# Patient Record
Sex: FEMALE | Race: BLACK OR AFRICAN AMERICAN | NOT HISPANIC OR LATINO | Employment: OTHER | URBAN - METROPOLITAN AREA
[De-identification: names, ages, dates, MRNs, and addresses within clinical notes are randomized per-mention and may not be internally consistent; named-entity substitution may affect disease eponyms.]

---

## 2023-03-08 ENCOUNTER — TELEPHONE (OUTPATIENT)
Dept: PRIMARY CARE | Facility: CLINIC | Age: 28
End: 2023-03-08
Payer: MEDICARE

## 2023-03-08 NOTE — TELEPHONE ENCOUNTER
Pt. Was seen in the ER at [facility] on Zak 3/5 and was diagnosed with Type B Influensa.    Pt. Is a [facility] resident.     Please let [facility] know as how to proceed.    Jeny/[facility]  [phone]  can lvm

## 2023-03-20 LAB
HYALINE CASTS, URINE: ABNORMAL /LPF
RBC, URINE: ABNORMAL /HPF (ref 0–5)
SQUAMOUS EPITHELIAL CELLS, URINE: 2 /HPF
WBC, URINE: ABNORMAL /HPF (ref 0–5)

## 2023-03-21 LAB — URINE CULTURE: NORMAL

## 2023-05-05 ENCOUNTER — HOSPITAL ENCOUNTER (OUTPATIENT)
Dept: DATA CONVERSION | Facility: HOSPITAL | Age: 28
End: 2023-05-05
Attending: ORTHOPAEDIC SURGERY | Admitting: ORTHOPAEDIC SURGERY
Payer: MEDICARE

## 2023-05-05 DIAGNOSIS — E55.9 VITAMIN D DEFICIENCY, UNSPECIFIED: ICD-10-CM

## 2023-05-05 DIAGNOSIS — G80.0 SPASTIC QUADRIPLEGIC CEREBRAL PALSY (MULTI): ICD-10-CM

## 2023-05-05 DIAGNOSIS — G47.00 INSOMNIA, UNSPECIFIED: ICD-10-CM

## 2023-05-05 DIAGNOSIS — G35 MULTIPLE SCLEROSIS (MULTI): ICD-10-CM

## 2023-05-05 DIAGNOSIS — K21.9 GASTRO-ESOPHAGEAL REFLUX DISEASE WITHOUT ESOPHAGITIS: ICD-10-CM

## 2023-05-05 DIAGNOSIS — J45.909 UNSPECIFIED ASTHMA, UNCOMPLICATED (HHS-HCC): ICD-10-CM

## 2023-05-05 DIAGNOSIS — D64.9 ANEMIA, UNSPECIFIED: ICD-10-CM

## 2023-05-05 DIAGNOSIS — F41.9 ANXIETY DISORDER, UNSPECIFIED: ICD-10-CM

## 2023-05-05 DIAGNOSIS — F20.9 SCHIZOPHRENIA, UNSPECIFIED (MULTI): ICD-10-CM

## 2023-05-08 PROBLEM — Q65.89 DEVELOPMENTAL DYSPLASIA OF HIP (HHS-HCC): Status: ACTIVE | Noted: 2023-05-08

## 2023-05-08 PROBLEM — R07.89 ATYPICAL CHEST PAIN: Status: ACTIVE | Noted: 2023-05-08

## 2023-05-08 PROBLEM — R63.0 POOR APPETITE: Status: ACTIVE | Noted: 2023-05-08

## 2023-05-08 PROBLEM — G44.229 CHRONIC TENSION-TYPE HEADACHE, NOT INTRACTABLE: Status: ACTIVE | Noted: 2023-05-08

## 2023-05-08 PROBLEM — K59.00 CONSTIPATION: Status: ACTIVE | Noted: 2023-05-08

## 2023-05-08 PROBLEM — M24.539 CONTRACTURE OF WRIST: Status: ACTIVE | Noted: 2023-05-08

## 2023-05-08 PROBLEM — R07.89 CHEST PAIN, MIDSTERNAL: Status: ACTIVE | Noted: 2023-05-08

## 2023-05-08 PROBLEM — R79.89 ABNORMAL TSH: Status: ACTIVE | Noted: 2023-05-08

## 2023-05-08 PROBLEM — R05.9 COUGH IN ADULT: Status: ACTIVE | Noted: 2023-05-08

## 2023-05-08 PROBLEM — R06.83 SNORING: Status: ACTIVE | Noted: 2023-05-08

## 2023-05-08 PROBLEM — H93.13 BILATERAL TINNITUS: Status: ACTIVE | Noted: 2023-05-08

## 2023-05-08 PROBLEM — R20.0 NUMBNESS, LIMB: Status: ACTIVE | Noted: 2023-05-08

## 2023-05-08 PROBLEM — B96.89 BV (BACTERIAL VAGINOSIS): Status: ACTIVE | Noted: 2023-05-08

## 2023-05-08 PROBLEM — D64.9 ANEMIA: Status: ACTIVE | Noted: 2023-05-08

## 2023-05-08 PROBLEM — M19.079 ANKLE ARTHRITIS: Status: ACTIVE | Noted: 2023-05-08

## 2023-05-08 PROBLEM — J18.9 CAP (COMMUNITY ACQUIRED PNEUMONIA): Status: ACTIVE | Noted: 2023-05-08

## 2023-05-08 PROBLEM — K29.60 REFLUX GASTRITIS: Status: ACTIVE | Noted: 2023-05-08

## 2023-05-08 PROBLEM — H90.3 BILATERAL SENSORINEURAL HEARING LOSS: Status: ACTIVE | Noted: 2023-05-08

## 2023-05-08 PROBLEM — K31.6 GASTROCUTANEOUS FISTULA: Status: ACTIVE | Noted: 2023-05-08

## 2023-05-08 PROBLEM — L89.90 PRESSURE INJURY OF SKIN: Status: ACTIVE | Noted: 2023-05-08

## 2023-05-08 PROBLEM — J30.9 ALLERGIC RHINITIS: Status: ACTIVE | Noted: 2023-05-08

## 2023-05-08 PROBLEM — G80.8 CEREBRAL PALSY, QUADRIPLEGIC (MULTI): Status: ACTIVE | Noted: 2023-05-08

## 2023-05-08 PROBLEM — H02.88A MEIBOMIAN GLAND DYSFUNCTION (MGD) OF UPPER AND LOWER EYELID OF RIGHT EYE: Status: ACTIVE | Noted: 2023-05-08

## 2023-05-08 PROBLEM — R35.0 FREQUENCY OF URINATION: Status: ACTIVE | Noted: 2023-05-08

## 2023-05-08 PROBLEM — R00.2 PALPITATIONS: Status: ACTIVE | Noted: 2023-05-08

## 2023-05-08 PROBLEM — R63.4 UNINTENTIONAL WEIGHT LOSS: Status: ACTIVE | Noted: 2023-05-08

## 2023-05-08 PROBLEM — U07.1 COVID-19: Status: ACTIVE | Noted: 2023-05-08

## 2023-05-08 PROBLEM — H16.223 KERATOCONJUNCTIVITIS SICCA OF BOTH EYES NOT DUE TO SJOGREN'S SYNDROME: Status: ACTIVE | Noted: 2023-05-08

## 2023-05-08 PROBLEM — G47.00 INSOMNIA: Status: ACTIVE | Noted: 2023-05-08

## 2023-05-08 PROBLEM — R30.0 BURNING WITH URINATION: Status: ACTIVE | Noted: 2023-05-08

## 2023-05-08 PROBLEM — F41.3 OTHER MIXED ANXIETY DISORDERS: Status: ACTIVE | Noted: 2023-05-08

## 2023-05-08 PROBLEM — S31.109A OPEN WOUND OF ABDOMEN: Status: ACTIVE | Noted: 2023-05-08

## 2023-05-08 PROBLEM — F41.9 ANXIETY: Status: ACTIVE | Noted: 2023-05-08

## 2023-05-08 PROBLEM — J45.909 ASTHMA (HHS-HCC): Status: ACTIVE | Noted: 2023-05-08

## 2023-05-08 PROBLEM — N76.0 BV (BACTERIAL VAGINOSIS): Status: ACTIVE | Noted: 2023-05-08

## 2023-05-08 PROBLEM — F29 PSYCHOSIS (MULTI): Status: ACTIVE | Noted: 2023-05-08

## 2023-05-08 PROBLEM — S60.849A: Status: ACTIVE | Noted: 2023-05-08

## 2023-05-08 PROBLEM — J34.3 HYPERTROPHY OF BOTH INFERIOR NASAL TURBINATES: Status: ACTIVE | Noted: 2023-05-08

## 2023-05-08 PROBLEM — N93.9 ABNORMAL UTERINE BLEEDING (AUB): Status: ACTIVE | Noted: 2023-05-08

## 2023-05-08 PROBLEM — R79.89 ELEVATED VITAMIN B12 LEVEL: Status: ACTIVE | Noted: 2023-05-08

## 2023-05-08 PROBLEM — H02.88B MEIBOMIAN GLAND DYSFUNCTION (MGD) OF UPPER AND LOWER EYELID OF LEFT EYE: Status: ACTIVE | Noted: 2023-05-08

## 2023-05-08 PROBLEM — R31.9 HEMATURIA: Status: ACTIVE | Noted: 2023-05-08

## 2023-05-08 PROBLEM — R13.11 DYSPHAGIA, ORAL PHASE: Status: ACTIVE | Noted: 2023-05-08

## 2023-05-08 PROBLEM — F70 MILD INTELLECTUAL DISABILITY: Status: ACTIVE | Noted: 2023-05-08

## 2023-05-08 PROBLEM — R20.8 NASAL BURNING: Status: ACTIVE | Noted: 2023-05-08

## 2023-05-08 PROBLEM — H52.13 MYOPIA OF BOTH EYES: Status: ACTIVE | Noted: 2023-05-08

## 2023-05-08 PROBLEM — R26.2 UNABLE TO WALK: Status: ACTIVE | Noted: 2023-05-08

## 2023-05-08 PROBLEM — K80.20 GALLSTONES: Status: ACTIVE | Noted: 2023-05-08

## 2023-05-08 PROBLEM — R13.12 OROPHARYNGEAL DYSPHAGIA: Status: ACTIVE | Noted: 2023-05-08

## 2023-05-08 PROBLEM — K13.0 DRY LIPS: Status: ACTIVE | Noted: 2023-05-08

## 2023-05-08 PROBLEM — K21.9 GASTROESOPHAGEAL REFLUX DISEASE: Status: ACTIVE | Noted: 2023-05-08

## 2023-05-08 PROBLEM — J34.89 NASAL MUCOSA DRY: Status: ACTIVE | Noted: 2023-05-08

## 2023-05-08 PROBLEM — J31.0 CHRONIC RHINITIS: Status: ACTIVE | Noted: 2023-05-08

## 2023-05-08 PROBLEM — R27.8 DECREASED COORDINATION: Status: ACTIVE | Noted: 2023-05-08

## 2023-05-08 PROBLEM — R29.3 ABNORMAL POSTURE: Status: ACTIVE | Noted: 2023-05-08

## 2023-05-08 PROBLEM — R10.11 ACUTE ABDOMINAL PAIN IN RIGHT UPPER QUADRANT: Status: ACTIVE | Noted: 2023-05-08

## 2023-05-08 PROBLEM — R74.8 ELEVATED VITAMIN B12 LEVEL: Status: ACTIVE | Noted: 2023-05-08

## 2023-05-08 PROBLEM — G47.19 EXCESSIVE DAYTIME SLEEPINESS: Status: ACTIVE | Noted: 2023-05-08

## 2023-06-28 ENCOUNTER — TELEMEDICINE (OUTPATIENT)
Dept: PRIMARY CARE | Facility: CLINIC | Age: 28
End: 2023-06-28
Payer: MEDICARE

## 2023-06-28 VITALS — SYSTOLIC BLOOD PRESSURE: 101 MMHG | DIASTOLIC BLOOD PRESSURE: 87 MMHG | BODY MASS INDEX: 29.26 KG/M2 | WEIGHT: 140 LBS

## 2023-06-28 DIAGNOSIS — R13.11 DYSPHAGIA, ORAL PHASE: Primary | ICD-10-CM

## 2023-06-28 DIAGNOSIS — K21.9 GASTROESOPHAGEAL REFLUX DISEASE, UNSPECIFIED WHETHER ESOPHAGITIS PRESENT: ICD-10-CM

## 2023-06-28 DIAGNOSIS — F20.9 SCHIZOPHRENIA, UNSPECIFIED TYPE (MULTI): Chronic | ICD-10-CM

## 2023-06-28 DIAGNOSIS — G80.8 CEREBRAL PALSY, QUADRIPLEGIC (MULTI): Chronic | ICD-10-CM

## 2023-06-28 PROBLEM — J06.9 UPPER RESPIRATORY INFECTION: Status: ACTIVE | Noted: 2023-06-28

## 2023-06-28 PROBLEM — R29.898 LEFT ARM WEAKNESS: Status: ACTIVE | Noted: 2023-06-28

## 2023-06-28 PROBLEM — R32 URINARY INCONTINENCE IN FEMALE: Status: ACTIVE | Noted: 2023-06-28

## 2023-06-28 PROBLEM — R39.15 URINARY URGENCY: Status: ACTIVE | Noted: 2023-06-28

## 2023-06-28 PROBLEM — R39.9 URINARY TRACT INFECTION SYMPTOMS: Status: ACTIVE | Noted: 2023-06-28

## 2023-06-28 PROBLEM — R53.1 WEAKNESS GENERALIZED: Status: ACTIVE | Noted: 2023-06-28

## 2023-06-28 PROBLEM — N89.8 VAGINAL ITCHING: Status: ACTIVE | Noted: 2023-06-28

## 2023-06-28 PROBLEM — R07.9 CHEST PAIN: Status: ACTIVE | Noted: 2023-06-28

## 2023-06-28 PROBLEM — R06.2 WHEEZING: Status: ACTIVE | Noted: 2023-06-28

## 2023-06-28 PROBLEM — F29 PSYCHOSIS (MULTI): Chronic | Status: ACTIVE | Noted: 2023-05-08

## 2023-06-28 PROBLEM — R20.2 TINGLING OF LEFT UPPER EXTREMITY: Status: ACTIVE | Noted: 2023-06-28

## 2023-06-28 PROCEDURE — 99214 OFFICE O/P EST MOD 30 MIN: CPT | Performed by: NURSE PRACTITIONER

## 2023-06-28 RX ORDER — RISPERIDONE 0.5 MG/1
0.5 TABLET ORAL 2 TIMES DAILY
COMMUNITY
Start: 2022-05-23 | End: 2023-12-08 | Stop reason: SDUPTHER

## 2023-06-28 RX ORDER — CICLOPIROX 7.7 MG/G
GEL TOPICAL
COMMUNITY
End: 2023-11-21 | Stop reason: ALTCHOICE

## 2023-06-28 RX ORDER — TRIAMCINOLONE ACETONIDE 55 UG/1
SPRAY, METERED NASAL
COMMUNITY
Start: 2022-09-22 | End: 2023-10-11 | Stop reason: ALTCHOICE

## 2023-06-28 RX ORDER — MUPIROCIN 20 MG/G
OINTMENT TOPICAL
Status: ON HOLD | COMMUNITY
Start: 2022-10-31

## 2023-06-28 RX ORDER — HYDROXYZINE HYDROCHLORIDE 50 MG/1
1 TABLET, FILM COATED ORAL NIGHTLY
COMMUNITY
Start: 2022-02-10 | End: 2023-12-08 | Stop reason: SDUPTHER

## 2023-06-28 RX ORDER — LORAZEPAM 1 MG/1
1 TABLET ORAL EVERY 8 HOURS PRN
COMMUNITY
Start: 2022-02-07 | End: 2024-04-15 | Stop reason: WASHOUT

## 2023-06-28 RX ORDER — MULTIVITAMIN
1 TABLET ORAL EVERY OTHER DAY
Status: ON HOLD | COMMUNITY
Start: 2022-11-17

## 2023-06-28 RX ORDER — BUDESONIDE AND FORMOTEROL FUMARATE DIHYDRATE 80; 4.5 UG/1; UG/1
AEROSOL RESPIRATORY (INHALATION)
COMMUNITY
Start: 2021-12-18 | End: 2023-11-21 | Stop reason: ALTCHOICE

## 2023-06-28 RX ORDER — ACETAMINOPHEN, DIPHENHYDRAMINE HCL, PHENYLEPHRINE HCL 325; 25; 5 MG/1; MG/1; MG/1
1 TABLET ORAL NIGHTLY
COMMUNITY
Start: 2022-04-04 | End: 2023-12-08 | Stop reason: DRUGHIGH

## 2023-06-28 RX ORDER — FERROUS SULFATE 325(65) MG
1 TABLET ORAL DAILY
Status: ON HOLD | COMMUNITY
Start: 2019-06-28

## 2023-06-28 RX ORDER — CARBOXYMETHYLCELLULOSE SODIUM 5 MG/ML
1 SOLUTION/ DROPS OPHTHALMIC 2 TIMES DAILY
Status: ON HOLD | COMMUNITY
Start: 2022-12-28

## 2023-06-28 RX ORDER — RISPERIDONE 3 MG/1
1 TABLET ORAL NIGHTLY
COMMUNITY
Start: 2023-04-05 | End: 2023-12-08 | Stop reason: SDUPTHER

## 2023-06-28 RX ORDER — METOPROLOL TARTRATE 25 MG/1
25 TABLET, FILM COATED ORAL DAILY
COMMUNITY
Start: 2022-02-05 | End: 2024-01-05 | Stop reason: ALTCHOICE

## 2023-06-28 RX ORDER — MONTELUKAST SODIUM 10 MG/1
1 TABLET ORAL NIGHTLY
Status: ON HOLD | COMMUNITY
Start: 2021-01-13

## 2023-06-28 RX ORDER — FAMOTIDINE 20 MG/1
20 TABLET, FILM COATED ORAL DAILY
Qty: 90 TABLET | Refills: 0 | Status: ON HOLD | OUTPATIENT
Start: 2023-06-28

## 2023-06-28 RX ORDER — IPRATROPIUM BROMIDE AND ALBUTEROL SULFATE 2.5; .5 MG/3ML; MG/3ML
SOLUTION RESPIRATORY (INHALATION) 2 TIMES DAILY
COMMUNITY
Start: 2021-01-26 | End: 2024-05-03 | Stop reason: SDUPTHER

## 2023-06-28 RX ORDER — AMMONIUM LACTATE 12 G/100G
CREAM TOPICAL
COMMUNITY
Start: 2021-04-21 | End: 2023-10-23 | Stop reason: ALTCHOICE

## 2023-06-28 RX ORDER — OMEPRAZOLE 20 MG/1
1 TABLET, DELAYED RELEASE ORAL DAILY
COMMUNITY
Start: 2023-01-27 | End: 2023-06-28 | Stop reason: SDUPTHER

## 2023-06-28 RX ORDER — OMEPRAZOLE 20 MG/1
20 TABLET, DELAYED RELEASE ORAL DAILY
Qty: 90 TABLET | Refills: 0 | Status: ON HOLD | OUTPATIENT
Start: 2023-06-28

## 2023-06-28 RX ORDER — ALBUTEROL SULFATE 90 UG/1
2 AEROSOL, METERED RESPIRATORY (INHALATION) 4 TIMES DAILY PRN
Status: ON HOLD | COMMUNITY
Start: 2019-06-28

## 2023-06-28 RX ORDER — FAMOTIDINE 20 MG/1
1 TABLET, FILM COATED ORAL DAILY
COMMUNITY
Start: 2019-06-28 | End: 2023-06-28 | Stop reason: SDUPTHER

## 2023-06-28 ASSESSMENT — ENCOUNTER SYMPTOMS
LOSS OF SENSATION IN FEET: 0
DEPRESSION: 0
OCCASIONAL FEELINGS OF UNSTEADINESS: 0

## 2023-06-28 ASSESSMENT — PATIENT HEALTH QUESTIONNAIRE - PHQ9
2. FEELING DOWN, DEPRESSED OR HOPELESS: NOT AT ALL
SUM OF ALL RESPONSES TO PHQ9 QUESTIONS 1 AND 2: 0
1. LITTLE INTEREST OR PLEASURE IN DOING THINGS: NOT AT ALL

## 2023-06-28 ASSESSMENT — COLUMBIA-SUICIDE SEVERITY RATING SCALE - C-SSRS
1. IN THE PAST MONTH, HAVE YOU WISHED YOU WERE DEAD OR WISHED YOU COULD GO TO SLEEP AND NOT WAKE UP?: NO
6. HAVE YOU EVER DONE ANYTHING, STARTED TO DO ANYTHING, OR PREPARED TO DO ANYTHING TO END YOUR LIFE?: NO
2. HAVE YOU ACTUALLY HAD ANY THOUGHTS OF KILLING YOURSELF?: NO

## 2023-06-28 ASSESSMENT — PAIN SCALES - GENERAL: PAINLEVEL: 0-NO PAIN

## 2023-06-28 NOTE — ASSESSMENT & PLAN NOTE
Vomiting large amounts with eating  Refer to ST; Swallow evaluation ordered  Chew well and eat smaller bites. Drink a sip of water with each bite.

## 2023-06-28 NOTE — PROGRESS NOTES
Subjective   Patient ID: Anisha Quevedo is a 27 y.o. female who presents for Follow-up (The patient has a virtual appointment for a follow up. ).    Patient of Dr. Garcia.    She has been throwing up while eating due to nausea.  Staff report maybe not chewing food well enough.  Has a history of dysphagia requiring PEG tube. Not currently in place.  Pepcid and omeprazole given at 7am. Not specified to be given on empty stomach.     She had her baclofen pump replaced on May 5th. Under control.         Review of Systems  otherwise negative aside from what was mentioned above in HPI.    Objective   /87   Wt 63.5 kg (140 lb)   BMI 29.26 kg/m²     Physical Exam    Assessment/Plan   Problem List Items Addressed This Visit          Gastrointestinal and Abdominal    Dysphagia, oral phase - Primary (Chronic)     Vomiting large amounts with eating  Refer to ST; Swallow evaluation ordered  Chew well and eat smaller bites. Drink a sip of water with each bite.           Relevant Orders    Referral to Speech Therapy    Gastroesophageal reflux disease (Chronic)     On prilosec and pepcid  Follow up with GI given vomiting.         Relevant Medications    omeprazole OTC (PriLOSEC OTC) 20 mg EC tablet    famotidine (Pepcid) 20 mg tablet       Mental Health    Psychosis (CMS/HCC) (Chronic)     Following with Psych  ON Risperdal            Neuro    Cerebral palsy, quadriplegic (CMS/HCC) (Chronic)     On baclofen pump  In wheelchair  Hx of PEG; given recent episodes of vomiting will recheck swallow eval

## 2023-06-28 NOTE — ASSESSMENT & PLAN NOTE
>>ASSESSMENT AND PLAN FOR PSYCHOSIS (CMS/[facility]) WRITTEN ON 6/28/2023 12:14 PM BY FRANCINE ARAUZ    Following with Psych  ON Risperdal

## 2023-06-28 NOTE — ASSESSMENT & PLAN NOTE
On baclofen pump  In wheelchair  Hx of PEG; given recent episodes of vomiting will recheck swallow eval

## 2023-07-11 LAB
CLUE CELLS: ABNORMAL
NUGENT SCORE: 4
URINE CULTURE: NORMAL
VAGINITIS-BV + YEAST INTERPRETATION: ABNORMAL
YEAST: ABNORMAL

## 2023-09-05 PROBLEM — E55.9 VITAMIN D DEFICIENCY: Status: ACTIVE | Noted: 2023-09-05

## 2023-09-05 PROBLEM — F20.9 SCHIZOPHRENIA (MULTI): Status: ACTIVE | Noted: 2023-09-05

## 2023-09-05 PROBLEM — E61.1 IRON DEFICIENCY: Status: ACTIVE | Noted: 2023-09-05

## 2023-09-05 PROBLEM — R00.0 TACHYCARDIA: Status: ACTIVE | Noted: 2023-09-05

## 2023-09-05 PROBLEM — H52.223 MYOPIA OF BOTH EYES WITH REGULAR ASTIGMATISM: Status: ACTIVE | Noted: 2023-05-08

## 2023-09-05 PROBLEM — Q89.9: Status: ACTIVE | Noted: 2023-09-05

## 2023-09-05 PROBLEM — M41.9 SCOLIOSIS: Status: ACTIVE | Noted: 2023-09-05

## 2023-09-05 PROBLEM — R41.0 DISORIENTATION: Status: ACTIVE | Noted: 2023-09-05

## 2023-09-05 PROBLEM — G82.50 SPASTIC QUADRIPLEGIA (MULTI): Status: ACTIVE | Noted: 2023-09-05

## 2023-09-05 PROBLEM — Z79.899 HIGH RISK MEDICATION USE: Status: ACTIVE | Noted: 2023-09-05

## 2023-09-05 PROBLEM — M62.81 MUSCLE WEAKNESS (GENERALIZED): Status: ACTIVE | Noted: 2023-09-05

## 2023-09-05 RX ORDER — METRONIDAZOLE 500 MG/1
TABLET ORAL
COMMUNITY
Start: 2023-02-16 | End: 2023-11-21 | Stop reason: ALTCHOICE

## 2023-09-05 RX ORDER — CICLOPIROX 7.7 MG/G
1 GEL TOPICAL DAILY
Status: ON HOLD | COMMUNITY
Start: 2023-03-05

## 2023-09-05 RX ORDER — AMOXICILLIN AND CLAVULANATE POTASSIUM 875; 125 MG/1; MG/1
TABLET, FILM COATED ORAL
COMMUNITY
Start: 2023-02-03 | End: 2023-11-21 | Stop reason: ALTCHOICE

## 2023-09-05 RX ORDER — SODIUM CHLORIDE 0.65 %
1 AEROSOL, SPRAY (ML) NASAL 2 TIMES DAILY PRN
Status: ON HOLD | COMMUNITY

## 2023-09-05 RX ORDER — OSELTAMIVIR PHOSPHATE 75 MG/1
CAPSULE ORAL
COMMUNITY
Start: 2023-03-06 | End: 2023-11-21 | Stop reason: ALTCHOICE

## 2023-09-05 RX ORDER — BUDESONIDE AND FORMOTEROL FUMARATE DIHYDRATE 80; 4.5 UG/1; UG/1
2 AEROSOL RESPIRATORY (INHALATION)
Status: ON HOLD | COMMUNITY
Start: 2022-04-11

## 2023-09-05 RX ORDER — AMMONIUM LACTATE 12 G/100G
1 CREAM TOPICAL DAILY
Status: ON HOLD | COMMUNITY
Start: 2022-02-03

## 2023-09-05 RX ORDER — TRIAMCINOLONE ACETONIDE 55 UG/1
2 SPRAY, METERED NASAL DAILY
COMMUNITY
Start: 2023-03-05 | End: 2023-10-11 | Stop reason: ALTCHOICE

## 2023-09-05 RX ORDER — RISPERIDONE 0.5 MG/1
0.5 TABLET ORAL 3 TIMES DAILY
COMMUNITY
Start: 2023-03-05 | End: 2023-11-21 | Stop reason: ALTCHOICE

## 2023-09-05 RX ORDER — METOPROLOL TARTRATE 25 MG/1
12.5 TABLET, FILM COATED ORAL 2 TIMES DAILY
Status: ON HOLD | COMMUNITY
Start: 2023-03-05

## 2023-09-05 RX ORDER — ALUMINUM HYDROXIDE, MAGNESIUM HYDROXIDE, AND SIMETHICONE 2400; 240; 2400 MG/30ML; MG/30ML; MG/30ML
15 SUSPENSION ORAL DAILY PRN
Status: ON HOLD | COMMUNITY

## 2023-09-05 RX ORDER — NAPROXEN 25 MG/ML
15 SUSPENSION ORAL 2 TIMES DAILY PRN
Status: ON HOLD | COMMUNITY

## 2023-09-05 RX ORDER — ACETAMINOPHEN 325 MG/1
1-2 TABLET ORAL EVERY 4 HOURS PRN
Status: ON HOLD | COMMUNITY

## 2023-09-05 RX ORDER — RISPERIDONE 2 MG/1
2 TABLET ORAL NIGHTLY
COMMUNITY
Start: 2023-03-05 | End: 2023-11-21 | Stop reason: ALTCHOICE

## 2023-09-05 RX ORDER — IPRATROPIUM BROMIDE AND ALBUTEROL SULFATE 2.5; .5 MG/3ML; MG/3ML
3 SOLUTION RESPIRATORY (INHALATION) 2 TIMES DAILY
COMMUNITY
Start: 2022-02-03 | End: 2023-11-21 | Stop reason: ALTCHOICE

## 2023-09-14 NOTE — H&P
History of Present Illness:   Pregnant/Lactating:  ·  Are You Pregnant no   ·  Are You Currently Breastfeeding no     History Present Illness:  Reason for surgery: cerebral palsy   HPI:     Anisha is an 27 year old female with a history of cerebral palsy spastic quadriplegia who presents  today for baclofen pump placement .     She had been attending occupational therapy over the summer for her cerebral palsy where has found it difficult to  pens or grasp small items. She also had some left hand pain and occasional numbness. Denies recent trauma or illnesses. Denies neck  pain or back pain. Is    Allergies:        Allergies:  ·  No Known Allergies :     Home Medication Review:   Home Medications Reviewed: yes     Impression/Procedure:   ·  Impression and Planned Procedure: Patient is ready for baclofen pump placement       ERAS (Enhanced Recovery After Surgery):  ·  ERAS Patient: no       Physical Exam by System:    Constitutional: No acute distress, cooperative   Eyes: EOM grossly intact   Head/Neck: Trachea midline   Respiratory/Thorax: Normal work of breathing   Cardiovascular: RRR on peripheral palpation   Gastrointestinal: Nondistended   Extremities: Moves extremities   Psychological: Appropriate mood/behavior   Skin: Warm and dry     Consent:   COVID-19 Consent:  ·  COVID-19 Risk Consent Surgeon has reviewed key risks related to the risk of rancho COVID-19 and if they contract COVID-19 what the risks are.     Attestation:   Note Completion:  I am a:  Resident/Fellow   Attending Attestation I saw and evaluated the patient.  I personally obtained the key and critical portions of the history and physical exam or was physically present for key and  critical portions performed by the resident/fellow. I reviewed the resident/fellow?s documentation and discussed the patient with the resident/fellow.  I agree with the resident/fellow?s medical decision making as documented in the note.     I personally  evaluated the patient on 05-May-2023         Electronic Signatures:  Edwardo Ryder (Resident))  (Signed 04-May-2023 22:44)   Authored: History of Present Illness, Allergies, Home  Medication Review, Impression/Procedure, ERAS, Physical Exam, Consent, Note Completion  Addis Mak)  (Signed 05-May-2023 06:51)   Authored: Note Completion   Co-Signer: History of Present Illness, Allergies, Home Medication Review, Impression/Procedure, ERAS, Physical Exam, Consent, Note Completion      Last Updated: 05-May-2023 06:51 by Addis Mak)

## 2023-10-02 NOTE — OP NOTE
Post Operative Note:     Post-Procedure Diagnosis: CPSQ   Procedure: 1. Replacement of baclofen pump  2. Revision of intrathecal catheter  3.   4.   5.   Surgeon: Obdulio   Resident/Fellow/Other Assistant: None   Estimated Blood Loss (mL): 2 mL   Specimen: no   Findings: Deep Pocket, no change in poor catheter  flow     Operative Report Dictated:  Dictation: yes     Attestation:   Note Completion:  Attending Attestation I performed the procedure without a resident         Electronic Signatures:  Addis Mak)  (Signed 05-May-2023 10:51)   Authored: Post Operative Note, Note Completion      Last Updated: 05-May-2023 10:51 by Addis Mak)

## 2023-10-05 ENCOUNTER — TELEMEDICINE (OUTPATIENT)
Dept: BEHAVIORAL HEALTH | Facility: CLINIC | Age: 28
End: 2023-10-05
Payer: MEDICARE

## 2023-10-05 DIAGNOSIS — F41.9 ANXIETY: ICD-10-CM

## 2023-10-05 PROCEDURE — 90834 PSYTX W PT 45 MINUTES: CPT | Performed by: COUNSELOR

## 2023-10-05 NOTE — PROGRESS NOTES
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)  Visit Type: virtual via zoom  Reason for visit: managing her anxiety    - overall doing really well, likes new day hab, but also feels overwhelmed  - her mood has been really good, anxiety has been down, but the “visions” and feeling like someone is touching her is still there  - was highly focused on the “why” things are happening    focused on:  - focusing on music, guided mediation, grounding techniques, mindfulness  - coping skills; affirmations, focus on creating solutions as compared to getting stuck on the why  - write down her physical ailments and lets problems solve (we talked about her nausea when washing her hair, eat crackers before showering)

## 2023-10-11 ENCOUNTER — OFFICE VISIT (OUTPATIENT)
Dept: OTOLARYNGOLOGY | Facility: CLINIC | Age: 28
End: 2023-10-11
Payer: MEDICARE

## 2023-10-11 ENCOUNTER — CLINICAL SUPPORT (OUTPATIENT)
Dept: AUDIOLOGY | Facility: CLINIC | Age: 28
End: 2023-10-11
Payer: MEDICARE

## 2023-10-11 VITALS
DIASTOLIC BLOOD PRESSURE: 73 MMHG | TEMPERATURE: 99 F | RESPIRATION RATE: 16 BRPM | SYSTOLIC BLOOD PRESSURE: 109 MMHG | HEART RATE: 96 BPM

## 2023-10-11 DIAGNOSIS — H90.3 BILATERAL SENSORINEURAL HEARING LOSS: Primary | ICD-10-CM

## 2023-10-11 DIAGNOSIS — H90.3 BILATERAL SENSORINEURAL HEARING LOSS: ICD-10-CM

## 2023-10-11 DIAGNOSIS — J31.0 CHRONIC RHINITIS: Primary | ICD-10-CM

## 2023-10-11 DIAGNOSIS — J34.89 NASAL MUCOSA DRY: ICD-10-CM

## 2023-10-11 PROCEDURE — 92557 COMPREHENSIVE HEARING TEST: CPT | Performed by: AUDIOLOGIST

## 2023-10-11 PROCEDURE — 99214 OFFICE O/P EST MOD 30 MIN: CPT | Performed by: STUDENT IN AN ORGANIZED HEALTH CARE EDUCATION/TRAINING PROGRAM

## 2023-10-11 PROCEDURE — 92550 TYMPANOMETRY & REFLEX THRESH: CPT | Performed by: AUDIOLOGIST

## 2023-10-11 PROCEDURE — 1036F TOBACCO NON-USER: CPT | Performed by: STUDENT IN AN ORGANIZED HEALTH CARE EDUCATION/TRAINING PROGRAM

## 2023-10-11 RX ORDER — BENZOCAINE .13; .15; .5; 2 G/100G; G/100G; G/100G; G/100G
2 GEL ORAL DAILY
Qty: 8.6 G | Refills: 11 | Status: ON HOLD | OUTPATIENT
Start: 2023-10-11 | End: 2024-10-10

## 2023-10-11 RX ORDER — BENZOCAINE .13; .15; .5; 2 G/100G; G/100G; G/100G; G/100G
2 GEL ORAL DAILY
Qty: 8.6 G | Refills: 11 | Status: SHIPPED | OUTPATIENT
Start: 2023-10-11 | End: 2023-10-11 | Stop reason: SDUPTHER

## 2023-10-11 ASSESSMENT — PATIENT HEALTH QUESTIONNAIRE - PHQ9
SUM OF ALL RESPONSES TO PHQ9 QUESTIONS 1 AND 2: 0
1. LITTLE INTEREST OR PLEASURE IN DOING THINGS: NOT AT ALL
2. FEELING DOWN, DEPRESSED OR HOPELESS: NOT AT ALL

## 2023-10-11 ASSESSMENT — ENCOUNTER SYMPTOMS
DEPRESSION: 0
OCCASIONAL FEELINGS OF UNSTEADINESS: 1
LOSS OF SENSATION IN FEET: 0

## 2023-10-11 ASSESSMENT — PAIN SCALES - GENERAL: PAINLEVEL: 0-NO PAIN

## 2023-10-11 NOTE — PROGRESS NOTES
Subjective   Patient ID: Anisha Quevedo is a 28 y.o. female who presents for Follow-up (Check ears  and patient stated that she has a burning in the when laying down).    History: 27-year-old female self-referred for evaluation of hearing concerns. She presents today with her caretaker; she lives in a group home due to a history of cerebral palsy.     She wears hearing aids and she is concerned that her hearing may be worsening. She feels that her previously appreciated tinnitus is somewhat improved with hearing aids. She denies otalgia and otorrhea but thinks there might be some cerumen today. Has noted some dizziness for the past few days. She denies a history of ear surgery.     She is noting some burning in the nose. She has a history of seasonal allergies. She uses saline spray as needed. She has been using Flonase as needed. Has not noticed worse nasal obstruction nor drainage. She has a history of asthma.     Update 10/31/2022:  Follow-up for several concerns:  1. Returns today with an audiogram. Notes persistently decreased hearing.  2. Notes persistent nasal burning. Notes bilateral sensation that bothers her daily. Has not improved with switching to Nasacort at our last visit. She denies epistaxis.     Update 2/8/2023:  Follow-up for several concerns:  1. Patient presents today with a caretaker. Caretaker notes that she would like a referral for repeat hearing testing.  2. She reports that the nasal irritation and burning has somewhat improved. She is continue to use mupirocin ointment daily. She is continuing to use Nasacort daily. Has had 1 episode of epistaxis since her last visit; this is fairly brief.     Update 10/11/2023: Here today with transport  Here today with repeat audiogram.  She was concerned about possible repeat impaction.  Otherwise no otalgia.  She is still noting some nasal irritation and burning.  Has been using Nasacort daily.  In addition, she is noting some headaches that are bothering  her daily.    Objective   Physical Exam  CONSTITUTIONAL: Well appearing female who appears stated age. Seen in wheelchair.  VOICE: Clear speech without hoarseness. Mild dysarthria. No stridor nor stertor.  HEAD, FACE, AND SKIN: Symmetric facial features.  EARS: External ears were normally formed with no lesions. The external auditory canals were clear. The tympanic membranes were intact and in the neutral position. No significant retraction pockets, effusions were appreciated.  NOSE: Nasal dorsum was midline. Anterior rhinoscopy demonstrated a septum relatively midline.  No irritation appreciated the nasal cells.  There is stable bilateral mild inferior turbinate hypertrophy. No drainage today. No obvious nasal masses, polyps, mucopurulence, nor other lesions were appreciated.  OROPHARYNX: No lesion nor mucosal abnormality. The uvula was normal appearing. No drainage in the oropharynx. Tonsils 1+.  Moderate pharyngeal cobblestoning appreciated today.  RESPIRATORY: Normal inspiration and expiration and chest wall expansion; no use of accessory muscles to breathe.  PSYCHIATRIC: Alert, appropriate mood and affect.     --------------------------------------------------  Audiology:  I personally reviewed the patient's audiogram from today.  This demonstrated a stable mild sloping to moderate sensorineural hearing loss bilaterally.  She continues to have excellent word recognition scores with amplification bilaterally.  She has type a tympanograms.  --------------------------------------------------    Assessment/Plan   Diagnoses and all orders for this visit:  Chronic rhinitis  -     budesonide (Rhinocort AQ) 32 mcg/actuation nasal spray; Administer 2 sprays into each nostril once daily.  Bilateral sensorineural hearing loss  Nasal mucosa dry    28 y.o. female 27-year-old female with symptoms of hearing loss as well as clinical findings consistent with chronic rhinitis.     1. Bilateral sensorineural hearing loss,  history of cerumen impaction  The patient has a history of bilateral sensorineural hearing loss. She is currently using hearing aids but has felt that her hearing has worsened over the last year.  On review of her audiogram 10/11/2023 she has stable mild sloping to moderate sensorineural hearing loss bilaterally.  She should continue to get this checked on a yearly basis.     2. Chronic Rhinitis, nasal burning, ITH  The patient has also been reporting nasal burning since she began living at her current assisted living facility. She was initially using fluticasone nasal spray as well as saline spray as needed. On anterior rhinoscopy the patient has moderate inferior turbinate hypertrophy as well as clear nasal drainage. She reports a history of environmental allergies.    We initially changed her from fluticasone to triamcinolone nasal sprays but she did not appreciate significant change in symptoms.  On a prior exam there was some mild irritation along the right nasal sill.  She previously appreciated improvement with mupirocin ointment.  However, today she is noting persistent nasal burning as well as headaches.  I therefore recommended that we switch to budesonide spray because it has decreased absorption compared to Nasacort.  She should continue to use mupirocin ointment as needed for irritation.    Follow-up in 1 year for the above indications.    This note was created using speech recognition transcription software. Despite proofreading, typographical errors may be present that affect the meaning of the content. Please contact my office with any questions.

## 2023-10-11 NOTE — PROGRESS NOTES
Name: Anisha Quevedo  YOB: 1995  Age: 28 y.o.    Date of Evaluation:  10/11/2023      History:  Reason for visit:  Anisha Quevedo is seen today at the request of Dr. Jhonny Sánchez for an evaluation of hearing.  Patient complains of Hearing Loss.  She has known hearing loss bilaterally and uses binaural hearing aids for the past 1.5 years.  She feels that her hearing is stable.  She has some balance issues, but they are not bothersome.    Evaluation:    Otoscopy revealed clear ear canal and tympanic membrane visualized bilaterally  Immittance testing indicated normal middle ear pressure, mobility, and ear canal volume bilaterally.  Ipsilateral acoustic reflexes were absent bilaterally at 500-4000 Hz.    Behavioral hearing testing indicated mild to moderate sensorineural hearing loss bilaterally.  Word recognition testing was completed using recorded speech at the patient's most comfortable level as documented on the audiogram.  Scores were excellent bilaterally.      Impression:    Testing indicated mild to moderate sensorineural hearing loss bilaterally.    Care Plan:    Follow-up with Dr. Sánchez.  Retest hearing annually to monitor.  Hearing aid checks or adjustments as needed with managing audiologist.    LUIS Mark, CCC-A  Audiologist        Time: [id number]

## 2023-10-11 NOTE — PATIENT INSTRUCTIONS
Today we evaluated your audiogram hearing test which was stable from your last test.  You should get this tested every 1 to 2 years to follow any changes in your hearing and to make adjustments in your hearing aids as needed.    You note that you continue to get some nasal burning symptoms occasionally.  I would recommend that you continue use of the mupirocin ointment as well as a nasal steroid spray.  You told me today that you are having headaches with the nasal steroid spray.  This can happen and so we will try to switch you to a different spray to see if this improves things.    Please see me in 1 year or earlier as needed.

## 2023-10-11 NOTE — LETTER
October 11, 2023     Jhonny Sánchez MD  [address] [facility] [facility] [address]  [facility] [place] [address]    Patient: Anisha Quevedo   YOB: 1995   Date of Visit: 10/11/2023       Dear Dr. Jhonny Sánchez MD:    Thank you for referring Anisha Quevedo to me for evaluation. Below are my notes for this consultation.  If you have questions, please do not hesitate to call me. I look forward to following your patient along with you.       Sincerely,     LUIS Mark, CCC-A      CC: No Recipients  ______________________________________________________________________________________    Name: Anisha Quevedo  YOB: 1995  Age: 28 y.o.    Date of Evaluation:  10/11/2023      History:  Reason for visit:  Anisha Quevedo is seen today at the request of Dr. Jhonny Sánchez for an evaluation of hearing.  Patient complains of Hearing Loss.  She has known hearing loss bilaterally and uses binaural hearing aids for the past 1.5 years.  She feels that her hearing is stable.  She has some balance issues, but they are not bothersome.    Evaluation:    Otoscopy revealed clear ear canal and tympanic membrane visualized bilaterally  Immittance testing indicated normal middle ear pressure, mobility, and ear canal volume bilaterally.  Ipsilateral acoustic reflexes were absent bilaterally at 500-4000 Hz.    Behavioral hearing testing indicated mild to moderate sensorineural hearing loss bilaterally.  Word recognition testing was completed using recorded speech at the patient's most comfortable level as documented on the audiogram.  Scores were excellent bilaterally.    Impression:    Testing indicated mild to moderate sensorineural hearing loss bilaterally.    Care Plan:    Follow-up with Dr. Sánchez.  Retest hearing annually to monitor.  Hearing aid checks or adjustments as needed with managing audiologist.    LUIS Mark, CCC-A  Audiologist        Time: [id number]

## 2023-11-02 ENCOUNTER — TELEMEDICINE (OUTPATIENT)
Dept: BEHAVIORAL HEALTH | Facility: CLINIC | Age: 28
End: 2023-11-02
Payer: MEDICARE

## 2023-11-02 DIAGNOSIS — F41.9 ANXIETY: ICD-10-CM

## 2023-11-02 DIAGNOSIS — F70 MILD INTELLECTUAL DISABILITY: ICD-10-CM

## 2023-11-02 PROCEDURE — 99215 OFFICE O/P EST HI 40 MIN: CPT | Performed by: COUNSELOR

## 2023-11-02 NOTE — PROGRESS NOTES
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 She notes there were a lot of new faces at the party which was upsetting to her (we did talk about how the new faces weren't her staff though). When asked about the shadows or other things she can hear/see she said that's “been good”  - she notes day hab continues to be really good and they are going on lots of outings; they went to a farm    focused on:  - focusing on music, guided mediation, grounding techniques, mindfulness  - coping skills; affirmations  - talking out loud more, asking for help

## 2023-11-17 ENCOUNTER — OFFICE VISIT (OUTPATIENT)
Dept: ORTHOPEDIC SURGERY | Facility: CLINIC | Age: 28
End: 2023-11-17
Payer: MEDICARE

## 2023-11-17 DIAGNOSIS — G80.0 CP (CEREBRAL PALSY), SPASTIC, QUADRIPLEGIC (MULTI): Primary | ICD-10-CM

## 2023-11-17 PROCEDURE — 64642 CHEMODENERV 1 EXTREMITY 1-4: CPT | Mod: PO | Performed by: ORTHOPAEDIC SURGERY

## 2023-11-17 PROCEDURE — 99213 OFFICE O/P EST LOW 20 MIN: CPT | Mod: PO | Performed by: ORTHOPAEDIC SURGERY

## 2023-11-17 PROCEDURE — 2500000004 HC RX 250 GENERAL PHARMACY W/ HCPCS (ALT 636 FOR OP/ED): Mod: JZ,PO | Performed by: ORTHOPAEDIC SURGERY

## 2023-11-17 NOTE — PROGRESS NOTES
Anisha is a 28 year old female with a history of cerebral palsy, spastic quadriplegia who presents today for Botox injections to her arm and hands.  She lives at [facility] and her caregivers report that she is having a difficult time feeding herself.  Anisha says that her hands and arms feel tight and she cannot grasp her silverware.  She attended OT earlier this year to work on strengthening.  She denies pain and neurologic symptoms.  She does not wear braces.  She underwent an ITB pump exchange on 5/5/2023 and her incisions are well healed.  Pentec does her refills and she is happy with her current dose.  She has no other concerns today.     Past Medical History:   Diagnosis Date    Congenital deformity of hip, unspecified     Congenital deformity of hip    Delirium due to known physiological condition 05/04/2021    Delirium due to another medical condition    Disorientation, unspecified     Confusion and disorientation    Gastro-esophageal reflux disease without esophagitis 11/07/2022    Gastroesophageal reflux disease    Migraine, unspecified, not intractable, without status migrainosus 07/22/2013    Migraine headache    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    Mild intellectual disability    Muscle weakness (generalized)     Generalized muscle weakness    Other allergy status, other than to drugs and biological substances     History of environmental allergies    Other cerebral palsy (CMS/HCC) 12/29/2022    Cerebral palsy, quadriplegic    Other cerebral palsy (CMS/HCC) 12/29/2022    Cerebral palsy, quadriplegic    Other cerebral palsy (CMS/HCC) 12/29/2022    Cerebral palsy, quadriplegic    Personal history of diseases of the blood and blood-forming organs and certain disorders involving the immune mechanism     History of anemia    Personal history of other diseases of the circulatory system     History of cardiac murmur    Personal history of other diseases of the digestive system     History of  constipation    Personal history of other diseases of the nervous system and sense organs 02/17/2021    History of hearing loss    Personal history of other diseases of the respiratory system     History of asthma    Personal history of other diseases of the respiratory system     History of allergic rhinitis    Personal history of other diseases of the respiratory system     History of upper respiratory infection    Personal history of other endocrine, nutritional and metabolic disease     History of iron deficiency    Personal history of other specified conditions     History of dysphagia    Personal history of other specified conditions     History of tachycardia    Personal history of pneumonia (recurrent)     History of pneumonia    Quadriplegia, unspecified (CMS/HCC)     Spastic quadriplegia    Unspecified asthma, uncomplicated 04/22/2021    Asthma    Vitamin D deficiency, unspecified 02/12/2020    Mild vitamin D deficiency       Past Surgical History:   Procedure Laterality Date    OTHER SURGICAL HISTORY  03/03/2021    Hip surgery    OTHER SURGICAL HISTORY  08/22/2019    Percutaneous endoscopic gastrostomy tube insertion    OTHER SURGICAL HISTORY  02/19/2020    Heart surgery    SPINAL FIXATION SURGERY  07/25/2014    Spinal Arthrodesis For Deformity By Posterior Approach       Current Outpatient Medications on File Prior to Visit   Medication Sig Dispense Refill    acetaminophen (Tylenol) 325 mg tablet Take 1-2 tablets (325-650 mg) by mouth every 4 hours if needed for fever (temp greater than 38.0 C) (or pain). No more than 3000 mg per day      albuterol 90 mcg/actuation inhaler Inhale 2 puffs 4 times a day as needed.      alum-mag hydroxide-simeth (Maalox MAX) 400-400-40 mg/5 mL suspension Take 15 mL by mouth once daily as needed.      ammonium lactate (Amlactin) 12 % cream Apply 1 Application topically once daily. Apply to right heal      amoxicillin-pot clavulanate (Augmentin) 875-125 mg tablet        budesonide (Rhinocort AQ) 32 mcg/actuation nasal spray Administer 2 sprays into each nostril once daily. 8.6 g 11    budesonide-formoteroL (Symbicort) 80-4.5 mcg/actuation inhaler Inhale.      budesonide-formoteroL (Symbicort) 80-4.5 mcg/actuation inhaler Inhale 2 puffs 2 times a day.      camphor-eucalyptus oiL-menthoL 5.3-1.3-2.8 % ointment roll-on Vaporizing Chest Rub 4.8-1.2-2.6 % External Ointment  Refills: 0      ciclopirox (Loprox) 0.77 % gel Ciclopirox GEL  Refills: 0      ciclopirox (Loprox) 0.77 % gel Apply 1 Application topically once daily. Apply to right great toenail      famotidine (Pepcid) 20 mg tablet Take 1 tablet (20 mg) by mouth once daily. Take on an empty stomach 90 tablet 0    ferrous sulfate 325 (65 Fe) MG tablet Take 1 tablet (325 mg) by mouth once daily.      hydrOXYzine HCL (Atarax) 50 mg tablet Take 1 tablet (50 mg) by mouth once daily at bedtime.      inhaler,assist device,lg mask (AEROCHAMBER PLUS FLOW-VU,L MSK MISC) Use as directed with inhaler      ipratropium-albuteroL (Duo-Neb) 0.5-2.5 mg/3 mL nebulizer solution Inhale twice a day.      ipratropium-albuteroL (Duo-Neb) 0.5-2.5 mg/3 mL nebulizer solution Inhale 3 mL 2 times a day. 15 minutes before using acapella device      LORazepam (Ativan) 1 mg tablet Take 1 tablet (1 mg) by mouth every 8 hours if needed (for severe anxiety and excessive reassurance-seeking that is not responsive to distraction and calming techniques).      melatonin 10 mg tablet Take 1 tablet (10 mg) by mouth once daily at bedtime.      metoprolol tartrate (Lopressor) 25 mg tablet Take 1 tablet (25 mg) by mouth once daily.      metoprolol tartrate (Lopressor) 25 mg tablet Take 0.5 tablets (12.5 mg) by mouth once daily.      metroNIDAZOLE (Flagyl) 500 mg tablet       montelukast (Singulair) 10 mg tablet Take 1 tablet (10 mg) by mouth once daily at bedtime.      multivitamin tablet Take 1 tablet by mouth every other day.      mupirocin (Bactroban) 2 % ointment  Apply a pea-size amount inside both nostrils once a day      naproxen (Naprosyn) 125 mg/5 mL suspension Take 15 mL (375 mg) by mouth 2 times a day as needed (for pain).      NON FORMULARY [place] [employer] Breakfast Plus LIQD; Use as directed. Consume for breakfast, lunch, dinner, or snack PRN. If pt does not want to eat food.      omeprazole OTC (PriLOSEC OTC) 20 mg EC tablet Take 1 tablet (20 mg) by mouth once daily. Take on an empty stomach 90 tablet 0    oseltamivir (Tamiflu) 75 mg capsule       Refresh Plus 0.5 % ophthalmic solution Administer 1 drop into both eyes 2 times a day.      RisperDAL 2 mg tablet Take 1 tablet (2 mg) by mouth once daily at bedtime.      risperiDONE (RisperDAL) 0.5 mg tablet Take 1 tablet (0.5 mg) by mouth 2 times a day. AM and 2pm      risperiDONE (RisperDAL) 0.5 mg tablet Take 1 tablet (0.5 mg) by mouth 3 times a day.      risperiDONE (RisperDAL) 3 mg tablet Take 1 tablet (3 mg) by mouth once daily at bedtime.      sodium chloride (Nasal Moisturizing) 0.65 % nasal spray Administer 1 spray into each nostril 2 times a day as needed (for dryness).       No current facility-administered medications on file prior to visit.       No Known Allergies    Family History   Problem Relation Name Age of Onset    Hypertension Sister      Leukemia Sister         Social History     Socioeconomic History    Marital status: Single     Spouse name: Not on file    Number of children: Not on file    Years of education: Not on file    Highest education level: Not on file   Occupational History    Not on file   Tobacco Use    Smoking status: Never    Smokeless tobacco: Never   Vaping Use    Vaping Use: Never used   Substance and Sexual Activity    Alcohol use: Never    Drug use: Never    Sexual activity: Not on file   Other Topics Concern    Not on file   Social History Narrative    Not on file     Social Determinants of Health     Financial Resource Strain: Not on file   Food Insecurity: Not on file    Transportation Needs: Not on file   Physical Activity: Not on file   Stress: Not on file   Social Connections: Not on file   Intimate Partner Violence: Not on file   Housing Stability: Not on file       ROS:  A 16 system review is negative in all systems except those listed above in the history, as reviewed by me.    Physical Exam:  Gen: WDWN female in NAD  Skin:  The skin is intact on all four extremities.  Pulses:  There are 2+ pulses in all 4 extremities.  LTS: The light touch sensation is intact.  LUE:  - Skin intact  - Sensation intact to light touch throughout left upper extremity  - 5/5 strength to finger flexion/ extension, wrist flexion, elbow extension, elbow flexion  - 4/5 strength to wrist extension  - 3/5 strength for shoulder elevation and flexion - so it's difficult for her to bring her arm up enough to feed herself  - Negative silver sign  - Palpable radial pulse, < 2 sec cap refill  RUE:  - She has contractures and excessive tone at her elbow and wrist  SPINE:  - While her spine is relatively straight, she has a tendency to lean to the right and her wheelchair lateral is a little too far lateral at this time.    A/P:  28 y.o. female with CPSQ  It would be very helpful for Anisha to have something positioned under her left elbow so that it is brought up to the height of her chin when it is time to eat.  In addition, we had a discussion that her left arm would be weakened further by Botox in that arm, but she was concerned that dressing has been difficult because of the tightness in her right arm, so she received some Botox on the right side.    It would also be helpful to have the right upright lateral of her wheelchair brought in closer to her body to keep her from leaving to the right side.    Under sterile conditions, 200 units of Botox was mixed with 4 mL of sterile saline and injected into the right biceps/brachialis/wrist flexors.  This was tolerated well.  Alcohol was applied to the skin  prior to the injection and his injection site was covered with a Band-Aid.  Aspiration was performed prior to administration of each Botox injection to be certain that the Botox has not been given intravascularly.

## 2023-11-17 NOTE — PATIENT INSTRUCTIONS
It would be very helpful for Anisha to have something positioned under her left elbow so that it is brought up to the height of her chin when it is time to eat.  In addition, we had a discussion that her left arm would be weakened further by Botox in that arm, but she was concerned that dressing has been difficult because of the tightness in her right arm, so she received some Botox on the right side.    It would also be helpful to have the right upright lateral of her wheelchair brought in closer to her body to keep her from leaving to the right side.

## 2023-11-19 RX ADMIN — ONABOTULINUMTOXINA 200 UNITS: 100 INJECTION, POWDER, LYOPHILIZED, FOR SOLUTION INTRADERMAL; INTRAMUSCULAR at 06:45

## 2023-11-20 ENCOUNTER — TELEPHONE (OUTPATIENT)
Dept: PULMONOLOGY | Facility: CLINIC | Age: 28
End: 2023-11-20
Payer: MEDICARE

## 2023-11-20 NOTE — TELEPHONE ENCOUNTER
Patient is scheduled 11/27/23 with Jackie Ortiz CNP.    Provider wont be in the office.    LMOM for pt to call the office so we can assist in rescheduling.    Office number given on VM.

## 2023-11-21 ENCOUNTER — OFFICE VISIT (OUTPATIENT)
Dept: PRIMARY CARE | Facility: CLINIC | Age: 28
End: 2023-11-21
Payer: MEDICARE

## 2023-11-21 VITALS
OXYGEN SATURATION: 96 % | WEIGHT: 166 LBS | SYSTOLIC BLOOD PRESSURE: 118 MMHG | TEMPERATURE: 97.5 F | BODY MASS INDEX: 34.69 KG/M2 | HEART RATE: 89 BPM | DIASTOLIC BLOOD PRESSURE: 82 MMHG | RESPIRATION RATE: 18 BRPM

## 2023-11-21 DIAGNOSIS — R07.89 CHEST PAIN, MIDSTERNAL: ICD-10-CM

## 2023-11-21 DIAGNOSIS — E55.9 VITAMIN D DEFICIENCY: ICD-10-CM

## 2023-11-21 DIAGNOSIS — J45.21 MILD INTERMITTENT ASTHMA WITH EXACERBATION (HHS-HCC): ICD-10-CM

## 2023-11-21 DIAGNOSIS — R74.8 ELEVATED VITAMIN B12 LEVEL: ICD-10-CM

## 2023-11-21 DIAGNOSIS — E78.2 MIXED HYPERLIPIDEMIA: ICD-10-CM

## 2023-11-21 DIAGNOSIS — Z00.00 ROUTINE GENERAL MEDICAL EXAMINATION AT HEALTH CARE FACILITY: Primary | ICD-10-CM

## 2023-11-21 DIAGNOSIS — D64.9 ANEMIA, UNSPECIFIED TYPE: ICD-10-CM

## 2023-11-21 DIAGNOSIS — R79.89 ABNORMAL TSH: ICD-10-CM

## 2023-11-21 DIAGNOSIS — R53.83 OTHER FATIGUE: ICD-10-CM

## 2023-11-21 DIAGNOSIS — G82.50 SPASTIC QUADRIPLEGIA (MULTI): ICD-10-CM

## 2023-11-21 PROCEDURE — 1036F TOBACCO NON-USER: CPT | Performed by: INTERNAL MEDICINE

## 2023-11-21 PROCEDURE — 99213 OFFICE O/P EST LOW 20 MIN: CPT | Performed by: INTERNAL MEDICINE

## 2023-11-21 PROCEDURE — G0439 PPPS, SUBSEQ VISIT: HCPCS | Performed by: INTERNAL MEDICINE

## 2023-11-21 RX ORDER — PREDNISONE 50 MG/1
50 TABLET ORAL DAILY
Qty: 5 TABLET | Refills: 0 | Status: SHIPPED | OUTPATIENT
Start: 2023-11-21 | End: 2023-11-26

## 2023-11-21 RX ORDER — AZITHROMYCIN 250 MG/1
TABLET, FILM COATED ORAL
Qty: 6 TABLET | Refills: 0 | Status: SHIPPED | OUTPATIENT
Start: 2023-11-21 | End: 2023-11-26

## 2023-11-21 ASSESSMENT — ENCOUNTER SYMPTOMS
SORE THROAT: 0
WOUND: 0
POLYDIPSIA: 0
FREQUENCY: 0
EYE PAIN: 0
DIARRHEA: 0
HEMATURIA: 0
HEADACHES: 0
DIZZINESS: 0
COUGH: 1
POLYPHAGIA: 0
SHORTNESS OF BREATH: 1
VOMITING: 1
ARTHRALGIAS: 0
RHINORRHEA: 0
PALPITATIONS: 0
DYSPHORIC MOOD: 0
CHEST TIGHTNESS: 0
UNEXPECTED WEIGHT CHANGE: 0
NERVOUS/ANXIOUS: 0
DYSURIA: 0
MYALGIAS: 0
NAUSEA: 1
ABDOMINAL PAIN: 0
FEVER: 0
CHILLS: 0
WHEEZING: 1
BLOOD IN STOOL: 0
CONSTIPATION: 0

## 2023-11-21 ASSESSMENT — ACTIVITIES OF DAILY LIVING (ADL)
DOING_HOUSEWORK: TOTAL CARE
GROCERY_SHOPPING: TOTAL CARE
BATHING: DEPENDENT
TAKING_MEDICATION: TOTAL CARE
MANAGING_FINANCES: TOTAL CARE
DRESSING: DEPENDENT

## 2023-11-21 ASSESSMENT — PATIENT HEALTH QUESTIONNAIRE - PHQ9
1. LITTLE INTEREST OR PLEASURE IN DOING THINGS: NOT AT ALL
2. FEELING DOWN, DEPRESSED OR HOPELESS: NOT AT ALL
SUM OF ALL RESPONSES TO PHQ9 QUESTIONS 1 AND 2: 0

## 2023-11-21 NOTE — PATIENT INSTRUCTIONS
I am going to give Anisha 5 days of prednisone.  I am prescribing a zpak.  If does not help, call and we will get an xray.    Anisha is due for fasting labs.    I want Anisha to see her gastroenterologist- Malini Bruce for the vomiting

## 2023-11-21 NOTE — PROGRESS NOTES
Subjective   Reason for Visit: Anisha Quevedo is an 28 y.o. female here for a Medicare Wellness visit.     Past Medical, Surgical, and Family History reviewed and updated in chart.  Reviewed all medications by prescribing practitioner or clinical pharmacist (such as prescriptions, OTCs, herbal therapies and supplements) and documented in the medical record.  HPI    Here for wellness  Feels ok but has had cough for a few weeks and is wet and barky  She states has some tightness  Not going away    Continues to have issues with vomiting with eating. Has not see GI  again. Omeprazole helps but still happens    She states hearing aides but when she wears them her ears feel plugged.    Patient Care Team:  Shelia Garcia MD as PCP - General  [facility]helia Garcia MD [facility]     Review of Systems   Constitutional:  Negative for chills, fever and unexpected weight change.   HENT:  Positive for hearing loss. Negative for congestion, rhinorrhea and sore throat.    Eyes:  Negative for pain and visual disturbance.   Respiratory:  Positive for cough, shortness of breath and wheezing. Negative for chest tightness.    Cardiovascular:  Positive for chest pain. Negative for palpitations.   Gastrointestinal:  Positive for nausea and vomiting. Negative for abdominal pain, blood in stool, constipation and diarrhea.   Endocrine: Negative for cold intolerance, heat intolerance, polydipsia and polyphagia.   Genitourinary:  Negative for dysuria, frequency and hematuria.   Musculoskeletal:  Negative for arthralgias and myalgias.   Skin:  Negative for rash and wound.   Neurological:  Negative for dizziness, syncope and headaches.   Psychiatric/Behavioral:  Negative for dysphoric mood. The patient is not nervous/anxious.        Objective   Vitals:  /82   Pulse 89   Temp 36.4 °C (97.5 °F)   Resp 18   Wt 75.3 kg (166 lb)   SpO2 96%   BMI 34.69 kg/m²       Physical Exam  Constitutional:       Appearance: Normal  appearance.   Cardiovascular:      Rate and Rhythm: Normal rate and regular rhythm.      Heart sounds: Normal heart sounds. No murmur heard.     No gallop.   Pulmonary:      Effort: Pulmonary effort is normal. No respiratory distress.      Breath sounds: Wheezing present.   Abdominal:      Palpations: Abdomen is soft.   Musculoskeletal:      Right lower leg: No edema.      Left lower leg: No edema.   Neurological:      Mental Status: She is alert.      Comments: in wheelchair. Atrophy of lower legs.          Assessment/Plan   Problem List Items Addressed This Visit       Abnormal TSH - Primary    Relevant Orders    TSH with reflex to Free T4 if abnormal    Anemia    Relevant Orders    CBC    Ferritin    Folate    Iron and TIBC    Chest pain, midsternal    Elevated vitamin B12 level    Relevant Orders    Vitamin B12    Vitamin D deficiency    Relevant Orders    Vitamin D 25-Hydroxy,Total (for eval of Vitamin D levels)    Spastic quadriplegia (CMS/HCC)    Relevant Orders    Comprehensive Metabolic Panel     Other Visit Diagnoses       Mixed hyperlipidemia        Relevant Orders    Lipid Panel    Other fatigue        Relevant Orders    TSH with reflex to Free T4 if abnormal    Mild intermittent asthma with exacerbation        Relevant Medications    predniSONE (Deltasone) 50 mg tablet    azithromycin (Zithromax) 250 mg tablet    Routine general medical examination at health care facility        Relevant Orders    1 Year Follow Up In [facility] - PCP - Wellness Exam             CPE completed.  Advised to keep a heart healthy, low fat diet with fruits and veggies like Mediterranean diet.  Advised on the importance of exercise and maintaining 150 minutes of exercise per week (30 minutes per day 5 days a week).  Advised on regular eye and dental visits.  Discussed age appropriate cancer screening, immunizations and recommendations given.  Discussed avoiding illicit drugs and tobacco. Advised on appropriate use  of alcohol.  Advised to wear seat belt.    Asthma exacerbation- steroid and zpak  -sees pulmonary next week     Schizophrenia: she is on risperdal  -off seroquel due to adverse side effects     Vomitting- advised to see GI again    Hx of Schatzki ring     Palpitations: holter neg  -continue metoprolol     RUQ pain: has gallstones and surgery stated not gallbladder. States if has pain-can consider MRI of pancreas as some fullness     Neck stiffness: naproxen as needed  -continue tens unit     Vaginal itching: seeing gyn  -comes and goes     Headaches: ok to use tylenol or naproxen as needed with zofran if nausea     Primary Spastic quadriplegia CP  -has baclofen pump  -doing botox  -in wheelchair  -PT/OT, and ST  -hx of peg     Allergic rhinitis: on flonase  -sees ENT     Had unknown heart procedure: unclear and were not able to find out, normal echo 5/21     Dysphagia: no longer has peg     Vitamin D deficiency     ASthma: sees Dr. Abarca  -on symbicort, albuterol, and Singulair     GERD: on omeperazole     Constipation: prn meds     Abnormal menses-SEES GYN     Follows with DR. Rima Addison--gyn  GI- Dr. Nance     f/u 6 months. Labs ordered  UTD covid, tdap, pneumovax

## 2023-11-22 ENCOUNTER — LAB (OUTPATIENT)
Dept: LAB | Facility: LAB | Age: 28
End: 2023-11-22
Payer: MEDICARE

## 2023-11-22 ENCOUNTER — OFFICE VISIT (OUTPATIENT)
Dept: OPHTHALMOLOGY | Facility: CLINIC | Age: 28
End: 2023-11-22
Payer: MEDICARE

## 2023-11-22 DIAGNOSIS — H52.223 REGULAR ASTIGMATISM OF BOTH EYES: Primary | ICD-10-CM

## 2023-11-22 DIAGNOSIS — G82.50 SPASTIC QUADRIPLEGIA (MULTI): ICD-10-CM

## 2023-11-22 DIAGNOSIS — R79.89 ABNORMAL TSH: ICD-10-CM

## 2023-11-22 DIAGNOSIS — R74.8 ELEVATED VITAMIN B12 LEVEL: ICD-10-CM

## 2023-11-22 DIAGNOSIS — H52.11 MYOPIA OF RIGHT EYE: ICD-10-CM

## 2023-11-22 DIAGNOSIS — E78.2 MIXED HYPERLIPIDEMIA: ICD-10-CM

## 2023-11-22 DIAGNOSIS — R53.83 OTHER FATIGUE: ICD-10-CM

## 2023-11-22 DIAGNOSIS — D64.9 ANEMIA, UNSPECIFIED TYPE: ICD-10-CM

## 2023-11-22 DIAGNOSIS — E55.9 VITAMIN D DEFICIENCY: ICD-10-CM

## 2023-11-22 LAB
25(OH)D3 SERPL-MCNC: 23 NG/ML (ref 30–100)
ALBUMIN SERPL BCP-MCNC: 4.2 G/DL (ref 3.4–5)
ALP SERPL-CCNC: 89 U/L (ref 33–110)
ALT SERPL W P-5'-P-CCNC: 18 U/L (ref 7–45)
ANION GAP SERPL CALC-SCNC: 17 MMOL/L (ref 10–20)
AST SERPL W P-5'-P-CCNC: 17 U/L (ref 9–39)
BILIRUB SERPL-MCNC: 0.4 MG/DL (ref 0–1.2)
BUN SERPL-MCNC: 9 MG/DL (ref 6–23)
CALCIUM SERPL-MCNC: 9.8 MG/DL (ref 8.6–10.3)
CHLORIDE SERPL-SCNC: 101 MMOL/L (ref 98–107)
CHOLEST SERPL-MCNC: 173 MG/DL (ref 0–199)
CHOLESTEROL/HDL RATIO: 2.3
CO2 SERPL-SCNC: 25 MMOL/L (ref 21–32)
CREAT SERPL-MCNC: 0.4 MG/DL (ref 0.5–1.05)
ERYTHROCYTE [DISTWIDTH] IN BLOOD BY AUTOMATED COUNT: 12.5 % (ref 11.5–14.5)
FERRITIN SERPL-MCNC: 142 NG/ML (ref 8–150)
FOLATE SERPL-MCNC: 13.5 NG/ML
GFR SERPL CREATININE-BSD FRML MDRD: >90 ML/MIN/1.73M*2
GLUCOSE SERPL-MCNC: 96 MG/DL (ref 74–99)
HCT VFR BLD AUTO: 43.6 % (ref 36–46)
HDLC SERPL-MCNC: 74 MG/DL
HGB BLD-MCNC: 14.3 G/DL (ref 12–16)
IRON SATN MFR SERPL: 19 % (ref 25–45)
IRON SERPL-MCNC: 68 UG/DL (ref 35–150)
LDLC SERPL CALC-MCNC: 92 MG/DL
MCH RBC QN AUTO: 30.2 PG (ref 26–34)
MCHC RBC AUTO-ENTMCNC: 32.8 G/DL (ref 32–36)
MCV RBC AUTO: 92 FL (ref 80–100)
NON HDL CHOLESTEROL: 99 MG/DL (ref 0–149)
NRBC BLD-RTO: 0 /100 WBCS (ref 0–0)
PLATELET # BLD AUTO: 307 X10*3/UL (ref 150–450)
POTASSIUM SERPL-SCNC: 4.6 MMOL/L (ref 3.5–5.3)
PROT SERPL-MCNC: 8.2 G/DL (ref 6.4–8.2)
RBC # BLD AUTO: 4.74 X10*6/UL (ref 4–5.2)
SODIUM SERPL-SCNC: 138 MMOL/L (ref 136–145)
T4 FREE SERPL-MCNC: 0.86 NG/DL (ref 0.61–1.12)
TIBC SERPL-MCNC: 356 UG/DL (ref 240–445)
TRIGL SERPL-MCNC: 37 MG/DL (ref 0–149)
TSH SERPL-ACNC: 0.2 MIU/L (ref 0.44–3.98)
UIBC SERPL-MCNC: 288 UG/DL (ref 110–370)
VIT B12 SERPL-MCNC: 716 PG/ML (ref 211–911)
VLDL: 7 MG/DL (ref 0–40)
WBC # BLD AUTO: 7.4 X10*3/UL (ref 4.4–11.3)

## 2023-11-22 PROCEDURE — 84443 ASSAY THYROID STIM HORMONE: CPT

## 2023-11-22 PROCEDURE — 92015 DETERMINE REFRACTIVE STATE: CPT | Performed by: OPTOMETRIST

## 2023-11-22 PROCEDURE — 36415 COLL VENOUS BLD VENIPUNCTURE: CPT

## 2023-11-22 PROCEDURE — 80061 LIPID PANEL: CPT

## 2023-11-22 PROCEDURE — 82728 ASSAY OF FERRITIN: CPT

## 2023-11-22 PROCEDURE — 99214 OFFICE O/P EST MOD 30 MIN: CPT | Performed by: OPTOMETRIST

## 2023-11-22 PROCEDURE — 82607 VITAMIN B-12: CPT

## 2023-11-22 PROCEDURE — 82306 VITAMIN D 25 HYDROXY: CPT

## 2023-11-22 PROCEDURE — 80053 COMPREHEN METABOLIC PANEL: CPT

## 2023-11-22 PROCEDURE — 83550 IRON BINDING TEST: CPT

## 2023-11-22 PROCEDURE — 82746 ASSAY OF FOLIC ACID SERUM: CPT

## 2023-11-22 PROCEDURE — 84439 ASSAY OF FREE THYROXINE: CPT

## 2023-11-22 PROCEDURE — 85027 COMPLETE CBC AUTOMATED: CPT

## 2023-11-22 PROCEDURE — 83540 ASSAY OF IRON: CPT

## 2023-11-22 ASSESSMENT — REFRACTION_WEARINGRX
OD_CYLINDER: -0.50
OD_AXIS: 090
OS_AXIS: 090
OD_SPHERE: -0.25
OS_CYLINDER: -1.00
OS_SPHERE: PLANO

## 2023-11-22 ASSESSMENT — REFRACTION_MANIFEST
OS_CYLINDER: -1.00
OD_AXIS: 090
OS_AXIS: 090
OD_SPHERE: -0.25
OD_CYLINDER: -0.50
OS_SPHERE: PLANO

## 2023-11-22 ASSESSMENT — CONF VISUAL FIELD
OS_SUPERIOR_NASAL_RESTRICTION: 0
OD_SUPERIOR_NASAL_RESTRICTION: 0
OD_SUPERIOR_TEMPORAL_RESTRICTION: 0
OD_NORMAL: 1
OS_NORMAL: 1
OS_INFERIOR_NASAL_RESTRICTION: 0
OD_INFERIOR_NASAL_RESTRICTION: 0
OD_INFERIOR_TEMPORAL_RESTRICTION: 0
OS_SUPERIOR_TEMPORAL_RESTRICTION: 0
OS_INFERIOR_TEMPORAL_RESTRICTION: 0

## 2023-11-22 ASSESSMENT — REFRACTION
OS_SPHERE: PLANO
OD_SPHERE: -0.50
OS_CYLINDER: -1.25
OD_CYLINDER: -0.50
OD_AXIS: 090
OS_AXIS: 090

## 2023-11-22 ASSESSMENT — CUP TO DISC RATIO
OD_RATIO: 0.4
OS_RATIO: 0.4

## 2023-11-22 ASSESSMENT — VISUAL ACUITY
METHOD: SNELLEN - LINEAR
CORRECTION_TYPE: GLASSES
OD_CC: 20/20
OS_CC: 20/20

## 2023-11-22 ASSESSMENT — EXTERNAL EXAM - LEFT EYE: OS_EXAM: NORMAL

## 2023-11-22 ASSESSMENT — ENCOUNTER SYMPTOMS
NEUROLOGICAL NEGATIVE: 1
PSYCHIATRIC NEGATIVE: 1

## 2023-11-22 ASSESSMENT — EXTERNAL EXAM - RIGHT EYE: OD_EXAM: NORMAL

## 2023-11-22 ASSESSMENT — SLIT LAMP EXAM - LIDS
COMMENTS: NORMAL
COMMENTS: NORMAL

## 2023-11-22 ASSESSMENT — TONOMETRY: IOP_UNABLETOASSESS: 1

## 2023-11-22 NOTE — PROGRESS NOTES
Assessment/Plan   Diagnoses and all orders for this visit:  Regular astigmatism of both eyes  Myopia of right eye  New spec rx released today per patient request. Ocular health wnl for age both eyes (OU) limited views of peripheral retina. Monitor 1 year or sooner prn. Refraction billed today.

## 2023-11-26 DIAGNOSIS — E55.9 VITAMIN D DEFICIENCY: Primary | ICD-10-CM

## 2023-11-26 RX ORDER — CHOLECALCIFEROL (VITAMIN D3) 50 MCG
50 TABLET ORAL DAILY
Qty: 30 TABLET | Refills: 11 | Status: ON HOLD | OUTPATIENT
Start: 2023-11-26 | End: 2024-11-25

## 2023-11-27 ENCOUNTER — TELEPHONE (OUTPATIENT)
Dept: PRIMARY CARE | Facility: CLINIC | Age: 28
End: 2023-11-27
Payer: MEDICARE

## 2023-11-27 NOTE — TELEPHONE ENCOUNTER
----- Message from Shelia Garcia MD sent at 11/26/2023 11:31 AM EST -----  Will you let [facility] know her labs were normal except low vitamin D? I am sending over once a day vitamin D

## 2023-12-01 NOTE — PROGRESS NOTES
Patient: Anisha Quevedo    [id number]  : 1995 -- AGE 28 y.o.    Provider: Jackie LEUNG [facility] [place]     Location [facility]   Service Date: 23         Department of Medicine  Division of Pulmonary, Critical Care, and Sleep Medicine         [facility]  Follow Up Visit Note    HISTORY OF PRESENT ILLNESS     HISTORY OF PRESENT ILLNESS   Anisha Quevedo is a 28 y.o. female who has a history of cerebral palsy.  She is a never smokers, who presents to a [facility] for a follow up evaluation for asthma.    DATE OF LAST VISIT: 2023    Current History    On today's visit, the patient is accompanied by a caregiver.  I called and spoke with her nurse at the facility she lives in to discuss the course of the last few months. She had a URI on 2023, PCP heard rhonchi on exam, she took a steroid burst  and Z-Waylon for 5 days. She reports feeling the same from a respiratory standpoint.  Occasional wheezing and cough. States she feels like her mucus is stuck in chest.  Denies shortness of breath at rest she did have a hospital visit a few months ago for the flu.  Today she is reporting sharp chest pain that is constant.  She has a history of anxiety and is not sure if it is anxiety related. Her nurse reports she has had a full cardiac workup for chest pains over the past 2 years and has been cleared by cardiology.    23: Mrs. Quevedo who has cerebral palsy and wheel chair bound is here accompanied by her caregiver. She uses symbicort 2 puffs twice a day. Duonebs twice a day. She reports an occasional cough. She says she noticed yesterday that she felt a little congested and SOB, duonebs helped. Denies wheezing, fevers chills, chest pain and ER visits for breathing issues.      Previous pulmonary history: She was previously told she has asthma. She currently is on no supplemental oxygen. She has never been to pulmonary rehab. Does not  recall having AECOPD requiring antibiotics or prednisone.     Inhalers/nebulized medications: symbicort, Duo-nebs     Hospitalization History: She has not been hospitalized over the last year for breathing related problem.     Sleep history: Denies snoring, apnea, feeling tired during the day or taking naps during the day.        REVIEW OF SYSTEMS     REVIEW OF SYSTEMS  Review of Systems    Constitutional: No fever, no chills, no night sweats.    Eyes: No double vision, no floaters, no dry eyes.   ENT: See HPI.   Neck: No neck stiffness.  Cardiovascular: No sharp chest pain, no heart racing, no leg swelling.  Respiratory: as noted in HPI.   Integumentary: No rashes or sores.  Neurological: No dizziness, no headaches. Sleeping well.  Psychiatric: No mood changes.     ALLERGIES AND MEDICATIONS     ALLERGIES  No Known Allergies    MEDICATIONS  Current Outpatient Medications   Medication Sig Dispense Refill    acetaminophen (Tylenol) 325 mg tablet Take 1-2 tablets (325-650 mg) by mouth every 4 hours if needed for fever (temp greater than 38.0 C) (or pain). No more than 3000 mg per day      albuterol 90 mcg/actuation inhaler Inhale 2 puffs 4 times a day as needed.      alum-mag hydroxide-simeth (Maalox MAX) 400-400-40 mg/5 mL suspension Take 15 mL by mouth once daily as needed.      ammonium lactate (Amlactin) 12 % cream Apply 1 Application topically once daily. Apply to right heal      budesonide (Rhinocort AQ) 32 mcg/actuation nasal spray Administer 2 sprays into each nostril once daily. 8.6 g 11    budesonide-formoteroL (Symbicort) 80-4.5 mcg/actuation inhaler Inhale 2 puffs 2 times a day.      cholecalciferol (Vitamin D3) 50 MCG (2000 UT) tablet Take 1 tablet (50 mcg) by mouth once daily. 30 tablet 11    ciclopirox (Loprox) 0.77 % gel Apply 1 Application topically once daily. Apply to right great toenail      famotidine (Pepcid) 20 mg tablet Take 1 tablet (20 mg) by mouth once daily. Take on an empty stomach 90  tablet 0    ferrous sulfate 325 (65 Fe) MG tablet Take 1 tablet by mouth once daily.      hydrOXYzine HCL (Atarax) 50 mg tablet Take 1 tablet (50 mg) by mouth once daily at bedtime.      inhaler,assist device,lg mask (AEROCHAMBER PLUS FLOW-VU,L MSK MISC) Use as directed with inhaler      ipratropium-albuteroL (Duo-Neb) 0.5-2.5 mg/3 mL nebulizer solution Inhale twice a day.      LORazepam (Ativan) 1 mg tablet Take 1 tablet (1 mg) by mouth every 8 hours if needed (for severe anxiety and excessive reassurance-seeking that is not responsive to distraction and calming techniques).      melatonin 10 mg tablet Take 1 tablet (10 mg) by mouth once daily at bedtime.      metoprolol tartrate (Lopressor) 25 mg tablet Take 1 tablet (25 mg) by mouth once daily.      metoprolol tartrate (Lopressor) 25 mg tablet Take 0.5 tablets (12.5 mg) by mouth once daily.      montelukast (Singulair) 10 mg tablet Take 1 tablet (10 mg) by mouth once daily at bedtime.      multivitamin tablet Take 1 tablet by mouth every other day.      mupirocin (Bactroban) 2 % ointment Apply a pea-size amount inside both nostrils once a day      naproxen (Naprosyn) 125 mg/5 mL suspension Take 15 mL (375 mg) by mouth 2 times a day as needed (for pain).      NON FORMULARY [place] [employer] Breakfast Plus LIQD; Use as directed. Consume for breakfast, lunch, dinner, or snack PRN. If pt does not want to eat food.      omeprazole OTC (PriLOSEC OTC) 20 mg EC tablet Take 1 tablet (20 mg) by mouth once daily. Take on an empty stomach 90 tablet 0    Refresh Plus 0.5 % ophthalmic solution Administer 1 drop into both eyes 2 times a day.      risperiDONE (RisperDAL) 0.5 mg tablet Take 1 tablet (0.5 mg) by mouth 2 times a day. AM and 2pm      risperiDONE (RisperDAL) 3 mg tablet Take 1 tablet (3 mg) by mouth once daily at bedtime.      sodium chloride (Nasal Moisturizing) 0.65 % nasal spray Administer 1 spray into each nostril 2 times a day as needed (for dryness).       No  current facility-administered medications for this visit.         PAST HISTORY     PAST MEDICAL HISTORY  Asthma  Anxiety  GERD     PAST SURGICAL HISTORY  Past Surgical History:   Procedure Laterality Date    OTHER SURGICAL HISTORY  03/03/2021    Hip surgery    OTHER SURGICAL HISTORY  08/22/2019    Percutaneous endoscopic gastrostomy tube insertion    OTHER SURGICAL HISTORY  02/19/2020    Heart surgery    SPINAL FIXATION SURGERY  07/25/2014    Spinal Arthrodesis For Deformity By Posterior Approach       IMMUNIZATION HISTORY  Immunization History   Administered Date(s) Administered    Flu vaccine (IIV4), preservative free *Check age/dose* 10/07/2020, 10/04/2021, 09/09/2022, 11/01/2023    Hepatitis A vaccine, pediatric/adolescent (HAVRIX, VAQTA) 06/19/2013, 09/03/2014    Influenza, seasonal, injectable 12/08/2006, 12/05/2008    Influenza, seasonal, injectable, preservative free 12/23/2009    Meningococcal ACWY vaccine (MENVEO) 09/03/2014    Meningococcal MCV4P 12/05/2008    Pfizer COVID-19 vaccine, Fall 2023, 12 years and older, (30mcg/0.3mL) 11/01/2023    Pfizer COVID-19 vaccine, bivalent, age 12 years and older (30 mcg/0.3 mL) 10/11/2022    Pfizer Purple Cap SARS-CoV-2 01/14/2021, 02/04/2021, 11/05/2021    Pneumococcal polysaccharide vaccine, 23-valent, age 2 years and older (PNEUMOVAX 23) 10/13/2020    RSV-MAb 11/01/2023    Tdap vaccine, age 7 year and older (BOOSTRIX) 12/05/2008, 10/13/2020    Varicella vaccine, subcutaneous (VARIVAX) 12/05/2008       SOCIAL HISTORY  Tobacco Smoking: never  Illicit drugs: none  Alcohol consumption: none  Pets: none    OCCUPATIONAL/ENVIRONMENTAL HISTORY  Occupation: Disability.  No have known exposure to asbestos, silica, beryllium or inhaled metals.    FAMILY HISTORY  Family History   Problem Relation Name Age of Onset    Hypertension Sister      Leukemia Sister       No have a family history of pulmonary disease.  No have a family history of cancer.    PHYSICAL EXAM     VITAL  SIGNS:   Vitals:    12/06/23 1408   BP: 116/82   Pulse: 89   Resp: 16   Temp: 36.8 °C (98.2 °F)   SpO2: 95%         PREVIOUS WEIGHTS: There is no height or weight on file to calculate BMI.    Wt Readings from Last 3 Encounters:   11/21/23 75.3 kg (166 lb)   07/10/23 63.5 kg (140 lb)   06/28/23 63.5 kg (140 lb)       Physical Exam    Constitutional: General appearance: Alert and oriented.  No acute distress. Well developed, well nourished.  Head and face: Symmetric  Pulmonary: Chest is normal. No increased work of breathing or signs of respiratory distress. Clear to auscultation bilaterally - no crackles, wheezing, or rhonchi.   Cardiovascular: Heart rate and rhythm normal. Normal S1, S2 - no murmurs, gallops, or pericardial rub.   Abdomen: Soft, non tender, +BS  Extremities: No edema. No clubbing or cyanosis of the fingernails.    Neurologic: Moves all four extremities   MSK: Normal movements of extremities. Gait normal   Psychiatric: Intact judgement and insight.    RESULTS/DATA     Pulmonary Function Test Results     6/23/21: FEV1/FVC 0.59 /FEV1 0.87 (36% + BD response)/ FVC 1.48 (54%)/ DLCO 68%     Chest Radiograph     XR CHEST 1 VIEW 04/26/2022    Narrative  MRN: [id number]  Patient Name: JB BURRELL    STUDY:  CHEST 1 VIEW;  4/26/2022 3:14 pm    INDICATION:  cough, hx aspiration .    COMPARISON:  03/29/2021    ACCESSION NUMBER(S):  [id number]    ORDERING CLINICIAN:  BELL RIDLEY    FINDINGS:  A single AP portable radiograph of the chest was obtained.  Multiple  cardiac monitoring leads are seen over the chest.   Bilateral spinal  fusion rods are present. No focal infiltrate, pleural effusion or  pneumothorax is identified. The cardiac silhouette  is within normal  limits for size.    Impression  No focal infiltrate or pneumothorax is identified.      Chest CT Scan     No testing.      Echocardiogram     [facility], [address] [place] [address]              Tel [phone] and Fax  [phone]     TRANSTHORACIC ECHOCARDIOGRAM REPORT        Patient Name:     JB BURRELL  Reading           [id number] [address] Rao                                 Physician:        MD  Study Date:       5/3/2021     Referring         CASSIDY RAMIREZ                                 Physician:  MRN/PID:          [id number]     PCP:  Accession/Order#: [id number] Department        [facility]                                 Location:  YOB: 1995    Fellow:  Gender:           F            Nurse:  Admit Date:                    Sonographer:      Mayelin Garcia [facility]  Admission Status: Outpatient   Additional Staff:  Height:           149.86 cm    CC Report to:  Weight:           64.86 kg     Study Type:       Echocardiogram  BSA:              1.60 m2  Blood Pressure: 99 /69 mmHg     Diagnosis/ICD: R07.9-Chest pain, unspecified  Indication:    Chest Pain  Procedure/CPT: Echo Complete w Full Doppler-[id number]     Patient History:  Pertinent History: Chest Pain and Dyspnea. COVID-19 recovered (12/16/2020),                     tachycardia, schizophrenia, cerebral palsy-quadriplegic.     Study Detail: The following Echo studies were performed: 2D, M-Mode, Doppler and                color flow. Technically challenging study due to patient sitting                in wheel chair.        PHYSICIAN INTERPRETATION:  Left Ventricle: The left ventricular systolic function is normal, with an estimated ejection fraction of 65-70%. There are no regional wall motion abnormalities. The left ventricular cavity size is normal. There is left ventricular concentric remodeling. Spectral Doppler shows a normal pattern of left ventricular diastolic filling.  Left Atrium: The left atrium is normal in size.  Right Ventricle: The right ventricle is normal in size. There is normal right ventricular global systolic function.  Right Atrium: The right atrium is normal in size.  Aortic Valve: The aortic valve is trileaflet. There  is no evidence of aortic valve regurgitation. The peak instantaneous gradient of the aortic valve is 4.5 mmHg. The mean gradient of the aortic valve is 2.7 mmHg.  Mitral Valve: The mitral valve is normal in structure. There is no evidence of mitral valve regurgitation.  Tricuspid Valve: The tricuspid valve is structurally normal. There is trace tricuspid regurgitation.  Pulmonic Valve: The pulmonic valve is structurally normal. There is physiologic pulmonic valve regurgitation.  Pericardium: There is no pericardial effusion noted.  Aorta: The aortic root is normal.  Pulmonary Artery: The tricuspid regurgitant velocity is 2.38 m/s, and with an estimated right atrial pressure of 3 mmHg, the estimated pulmonary artery pressure is normal with the RVSP at 25.6 mmHg.  Systemic Veins: The inferior vena cava appears to be of normal size. There is IVC inspiratory collapse greater than 50%.        CONCLUSIONS:   1. The left ventricular systolic function is normal with a 65-70% estimated ejection fraction.     QUANTITATIVE DATA SUMMARY:  2D MEASUREMENTS:                           Normal Ranges:  Ao Root d:     2.20 cm   (2.0-3.7cm)  LAs:           1.57 cm   (2.7-4.0cm)  IVSd:          1.03 cm   (0.6-1.1cm)  LVPWd:         1.18 cm   (0.6-1.1cm)  LVIDd:         2.59 cm   (3.9-5.9cm)  LVIDs:         1.90 cm  LV Mass Index: 49.2 g/m2  LV % FS        26.6 %     LA VOLUME:                                Normal Ranges:  LA Vol A4C:        32.1 ml    (22+/-6mL/m2)  LA Vol A2C:        26.7 ml  LA Vol BP:         29.3 ml  LA Vol Index A4C:  20.1 ml/m2  LA Vol Index A2C:  16.7 ml/m2  LA Vol Index BP:   18.3 ml/m2  LA Area A4C:       12.3 cm2  LA Area A2C:       11.2 cm2  LA Major Axis A4C: 4.0 cm  LA Major Axis A2C: 4.0 cm  LA Volume Index:   18.3 ml/m2  LA Vol A4C:        29.4 ml  LA Vol A2C:        25.9 ml     AORTA MEASUREMENTS:                     Normal Ranges:  Asc Ao, d: 2.30 cm (2.1-3.4cm)     LV DIASTOLIC FUNCTION:                "          Normal Ranges:  MV Peak E:    0.77 m/s (0.7-1.2 m/s)  MV Peak A:    0.77 m/s (0.42-0.7 m/s)  E/A Ratio:    0.99     (1.0-2.2)  MV e'         0.08 m/s (>8.0)  MV lateral e' 0.09 m/s  MV medial e'  0.08 m/s  E/e' Ratio:   9.02     (<8.0)     AORTIC VALVE:                                    Normal Ranges:  AoV Vmax:                1.07 m/s (<1.7m/s)  AoV Peak P.5 mmHg (<20mmHg)  AoV Mean P.7 mmHg (1.7-11.5mmHg)  LVOT Max Vega:            0.79 m/s (<1.1m/s)  AoV VTI:                 16.86 cm (18-25cm)  LVOT VTI:                12.63 cm  LVOT Diameter:           1.60 cm  (1.8-2.4cm)  AoV Area, VTI:           1.52 cm2 (2.5-5.5cm2)  AoV Area,Vmax:           1.50 cm2 (2.5-4.5cm2)  AoV Dimensionless Index: 0.75     RIGHT VENTRICLE:  TAPSE: 19.0 mm  RV s'  0.15 m/s     TRICUSPID VALVE/RVSP:                              Normal Ranges:  Peak TR Velocity: 2.38 m/s  RV Syst Pressure: 25.6 mmHg (< 30mmHg)  IVC Diam:         1.40 cm     PULMONIC VALVE:                       Normal Ranges:  PV Max Vega: 0.8 m/s  (0.6-0.9m/s)  PV Max P.3 mmHg        [id number] Saad Rao MD  Electronically signed on 5/3/2021 at 2:44:25 PM          Labwork   Complete Blood Count  Lab Results   Component Value Date    WBC 7.4 2023    HGB 14.3 2023    HCT 43.6 2023    MCV 92 2023     2023       Peripheral Eosinophil Count/Percentage:   Eosinophils Absolute (x10E9/L)   Date Value   2022 0.05     Eosinophils % (%)   Date Value   2022 0.7       Serum Immunoglobulin E:    No results found for: \"IGE\"       ASSESSMENT/PLAN     Ms. Quevedo is a 28 y.o. female, who has a history of cerebral palsy.  She is a never smoker, who presents to a [facility] for a follow up evaluation for asthma.      Problem List and Orders  Problem List Items Addressed This Visit       Upper respiratory infection    Relevant Orders    XR chest 2 views "     Other Visit Diagnoses       Chest pain at rest    -  Primary    Relevant Orders    ECG 12 Lead    XR chest 2 views            Assessment and Plan / Recommendations:  Problem List Items Addressed This Visit    None     Asthma; moderate obstruction on most recent PFTs  - Continue Symbicort 2 puffs twice a day  - Continue albuterol HFA ipratropium/albuterol nebulizers twice a day  - Continue montelukast 10 mg at bedtime   - Acapella -ok to use PRN after breathing treatments for mucus relief   - Mucinex PRN for every 12 hours for mucus relief       - Will get CXR today - recent URI               Chest pain       - Will get EKG today for chest pain    Follow up in 6 months or sooner if needed.    If you have any questions please call the office [phone]    Thank you for visiting the Pulmonary clinic today!   Jackie Ortiz CNP  [phone]

## 2023-12-06 ENCOUNTER — HOSPITAL ENCOUNTER (OUTPATIENT)
Dept: CARDIOLOGY | Facility: HOSPITAL | Age: 28
Discharge: HOME | End: 2023-12-06
Payer: MEDICARE

## 2023-12-06 ENCOUNTER — HOSPITAL ENCOUNTER (OUTPATIENT)
Dept: RADIOLOGY | Facility: HOSPITAL | Age: 28
Discharge: HOME | End: 2023-12-06
Payer: MEDICARE

## 2023-12-06 ENCOUNTER — OFFICE VISIT (OUTPATIENT)
Dept: PULMONOLOGY | Facility: CLINIC | Age: 28
End: 2023-12-06
Payer: MEDICARE

## 2023-12-06 VITALS
RESPIRATION RATE: 16 BRPM | DIASTOLIC BLOOD PRESSURE: 82 MMHG | SYSTOLIC BLOOD PRESSURE: 116 MMHG | HEART RATE: 89 BPM | OXYGEN SATURATION: 95 % | TEMPERATURE: 98.2 F

## 2023-12-06 DIAGNOSIS — R07.9 CHEST PAIN AT REST: ICD-10-CM

## 2023-12-06 DIAGNOSIS — J45.40 MODERATE PERSISTENT ASTHMA WITHOUT COMPLICATION (HHS-HCC): Primary | ICD-10-CM

## 2023-12-06 DIAGNOSIS — J06.9 VIRAL UPPER RESPIRATORY TRACT INFECTION: ICD-10-CM

## 2023-12-06 LAB
ATRIAL RATE: 93 BPM
P AXIS: 42 DEGREES
P OFFSET: 181 MS
P ONSET: 153 MS
PR INTERVAL: 130 MS
Q ONSET: 218 MS
QRS COUNT: 15 BEATS
QRS DURATION: 68 MS
QT INTERVAL: 342 MS
QTC CALCULATION(BAZETT): 425 MS
QTC FREDERICIA: 396 MS
R AXIS: 4 DEGREES
T AXIS: 7 DEGREES
T OFFSET: 389 MS
VENTRICULAR RATE: 93 BPM

## 2023-12-06 PROCEDURE — 93005 ELECTROCARDIOGRAM TRACING: CPT

## 2023-12-06 PROCEDURE — 71046 X-RAY EXAM CHEST 2 VIEWS: CPT | Mod: FY

## 2023-12-06 PROCEDURE — 71046 X-RAY EXAM CHEST 2 VIEWS: CPT | Performed by: RADIOLOGY

## 2023-12-06 PROCEDURE — 1036F TOBACCO NON-USER: CPT

## 2023-12-06 PROCEDURE — 99214 OFFICE O/P EST MOD 30 MIN: CPT

## 2023-12-06 ASSESSMENT — COLUMBIA-SUICIDE SEVERITY RATING SCALE - C-SSRS
2. HAVE YOU ACTUALLY HAD ANY THOUGHTS OF KILLING YOURSELF?: NO
1. IN THE PAST MONTH, HAVE YOU WISHED YOU WERE DEAD OR WISHED YOU COULD GO TO SLEEP AND NOT WAKE UP?: NO
6. HAVE YOU EVER DONE ANYTHING, STARTED TO DO ANYTHING, OR PREPARED TO DO ANYTHING TO END YOUR LIFE?: NO

## 2023-12-06 ASSESSMENT — PAIN SCALES - GENERAL: PAINLEVEL: 6

## 2023-12-06 ASSESSMENT — PATIENT HEALTH QUESTIONNAIRE - PHQ9
SUM OF ALL RESPONSES TO PHQ9 QUESTIONS 1 AND 2: 0
2. FEELING DOWN, DEPRESSED OR HOPELESS: NOT AT ALL
1. LITTLE INTEREST OR PLEASURE IN DOING THINGS: NOT AT ALL

## 2023-12-06 ASSESSMENT — ENCOUNTER SYMPTOMS
DEPRESSION: 0
LOSS OF SENSATION IN FEET: 0
OCCASIONAL FEELINGS OF UNSTEADINESS: 0

## 2023-12-06 NOTE — PATIENT INSTRUCTIONS
Asthma; moderate obstruction on most recent PFTs  - Continue Symbicort 2 puffs twice a day  - Continue albuterol HFA ipratropium/albuterol nebulizers twice a day  - Continue montelukast 10 mg at bedtime   - acapella -ok to use PRN after breathing treatments for mucus relief   - mucinex PRN for every 12 hours for mucus relief       - Will get CXR today - recent URI               Chest pain       - Will get EKG today for chest pain    Follow up in 6 months or sooner if needed.    If you have any questions please call the office [phone]

## 2023-12-08 ENCOUNTER — TELEPHONE (OUTPATIENT)
Dept: PULMONOLOGY | Facility: CLINIC | Age: 28
End: 2023-12-08
Payer: MEDICARE

## 2023-12-08 ENCOUNTER — TELEMEDICINE (OUTPATIENT)
Dept: BEHAVIORAL HEALTH | Facility: CLINIC | Age: 28
End: 2023-12-08
Payer: MEDICARE

## 2023-12-08 DIAGNOSIS — G47.09 OTHER INSOMNIA: ICD-10-CM

## 2023-12-08 DIAGNOSIS — F41.9 ANXIETY: ICD-10-CM

## 2023-12-08 DIAGNOSIS — F20.9 SCHIZOPHRENIA, UNSPECIFIED TYPE (MULTI): Chronic | ICD-10-CM

## 2023-12-08 DIAGNOSIS — G80.8 CEREBRAL PALSY, QUADRIPLEGIC (MULTI): Chronic | ICD-10-CM

## 2023-12-08 DIAGNOSIS — F70 MILD INTELLECTUAL DISABILITY: ICD-10-CM

## 2023-12-08 DIAGNOSIS — F20.0 PARANOID SCHIZOPHRENIA (MULTI): Primary | ICD-10-CM

## 2023-12-08 PROBLEM — N76.0 BV (BACTERIAL VAGINOSIS): Status: RESOLVED | Noted: 2023-05-08 | Resolved: 2023-12-08

## 2023-12-08 PROBLEM — R30.0 BURNING WITH URINATION: Status: RESOLVED | Noted: 2023-05-08 | Resolved: 2023-12-08

## 2023-12-08 PROBLEM — R07.89 CHEST PAIN, MIDSTERNAL: Status: RESOLVED | Noted: 2023-05-08 | Resolved: 2023-12-08

## 2023-12-08 PROBLEM — U07.1 COVID-19: Status: RESOLVED | Noted: 2023-05-08 | Resolved: 2023-12-08

## 2023-12-08 PROBLEM — R63.0 POOR APPETITE: Status: RESOLVED | Noted: 2023-05-08 | Resolved: 2023-12-08

## 2023-12-08 PROBLEM — R39.9 URINARY TRACT INFECTION SYMPTOMS: Status: RESOLVED | Noted: 2023-06-28 | Resolved: 2023-12-08

## 2023-12-08 PROBLEM — J18.9 CAP (COMMUNITY ACQUIRED PNEUMONIA): Status: RESOLVED | Noted: 2023-05-08 | Resolved: 2023-12-08

## 2023-12-08 PROBLEM — R07.9 CHEST PAIN: Status: RESOLVED | Noted: 2023-06-28 | Resolved: 2023-12-08

## 2023-12-08 PROBLEM — B96.89 BV (BACTERIAL VAGINOSIS): Status: RESOLVED | Noted: 2023-05-08 | Resolved: 2023-12-08

## 2023-12-08 PROBLEM — R10.11 ACUTE ABDOMINAL PAIN IN RIGHT UPPER QUADRANT: Status: RESOLVED | Noted: 2023-05-08 | Resolved: 2023-12-08

## 2023-12-08 PROBLEM — R41.0 DISORIENTATION: Status: RESOLVED | Noted: 2023-09-05 | Resolved: 2023-12-08

## 2023-12-08 PROBLEM — S31.109A OPEN WOUND OF ABDOMEN: Status: RESOLVED | Noted: 2023-05-08 | Resolved: 2023-12-08

## 2023-12-08 PROCEDURE — 99215 OFFICE O/P EST HI 40 MIN: CPT | Performed by: PSYCHIATRY & NEUROLOGY

## 2023-12-08 RX ORDER — HYDROXYZINE HYDROCHLORIDE 50 MG/1
50 TABLET, FILM COATED ORAL NIGHTLY
Qty: 30 TABLET | Refills: 2 | Status: SHIPPED | OUTPATIENT
Start: 2023-12-08 | End: 2024-02-17 | Stop reason: SDUPTHER

## 2023-12-08 RX ORDER — RISPERIDONE 3 MG/1
3 TABLET ORAL NIGHTLY
Qty: 30 TABLET | Refills: 2 | Status: SHIPPED | OUTPATIENT
Start: 2023-12-08 | End: 2024-02-17 | Stop reason: SDUPTHER

## 2023-12-08 RX ORDER — RISPERIDONE 0.5 MG/1
0.5 TABLET ORAL 2 TIMES DAILY
Qty: 60 TABLET | Refills: 2 | Status: SHIPPED | OUTPATIENT
Start: 2023-12-08 | End: 2024-02-17 | Stop reason: SDUPTHER

## 2023-12-08 RX ORDER — MELATONIN 5 MG
5 CAPSULE ORAL NIGHTLY
Qty: 30 CAPSULE | Refills: 3 | Status: SHIPPED | OUTPATIENT
Start: 2023-12-08 | End: 2024-02-17 | Stop reason: SDUPTHER

## 2023-12-08 RX ORDER — RISPERIDONE 1 MG/1
0.5 TABLET ORAL NIGHTLY
Qty: 15 TABLET | Refills: 2 | Status: SHIPPED | OUTPATIENT
Start: 2023-12-08 | End: 2024-02-17 | Stop reason: SDUPTHER

## 2023-12-08 NOTE — PATIENT INSTRUCTIONS
Some ongoing issues with anxiety and delusional thinking.  > Increase risperidone to 0.5mg in the morning, 0.5mg at 2pm, and 3.5mg at bedtime.  > Decrease melatonin to 5mg at bedtime to reduce excessive sedation.    Next appointment: Friday 2/16/2024 at 4:00 PM leena

## 2023-12-08 NOTE — PROGRESS NOTES
Outpatient Psychiatry    A HIPAA-compliant interactive audio and video telecommunication system which permits real time communications between the patient (at the originating site) and provider (at the distant site) was utilized to provide this telehealth service.     The patient, family, caregivers and guardian (as appropriate) have provided consent on this date to conduct treatment via this telehealth service.  The patient's identity and physical location were verified at the time of this visit.      Present for appointment: Anisha and [facility]  (Lynda).    SUBJECTIVE    No significant health problems for Anisha since we last met on 9/08/23.  She reports some recent increase in anxiety over the past few days -- unsure what may be causing this.  Last visit with Zuly for therapy was 11/02/23.  She has not used any PRN lorazepam since our last appointment.   Attending [facility] on a regular basis and likes going there.  Staff note a clear increase in self-talk when she is alone in her bedroom or the bathroom.  It sounds like Anisha is having a two-sided, back-and-forth conversation with someone not present.   She reports intermittent auditory hallucinations but can't give a lot of additional information about them.  She does not seem to be bringing up significant paranoid or persecutory delusional themes or making accusations against staff.  She does not feel like she's having any EPS or other side effects from the risperidone.   No abnormal or involuntary movements are noted during our session today.     Review of Systems   HENT:  Positive for trouble swallowing. Negative for drooling.    Respiratory:  Positive for chest tightness (she attributes to anxiety). Negative for cough and choking.    Cardiovascular:  Negative for palpitations.   Gastrointestinal:  Positive for vomiting (occasional episodes). Negative for abdominal distention.   Genitourinary:  Negative for dysuria.    Neurological:  Negative for tremors and seizures.   Psychiatric/Behavioral:  Positive for hallucinations and sleep disturbance.       Controlled Substance Evaluation  OARRS/PDMP reviewed: Matt Stevens MD on 12/8/2023  6:31 AM  Is the patient prescribed a combination of a benzodiazepine and opioid? No  I have personally reviewed the OARRS report for Anisha Quevedo.   I have considered the risks of abuse, dependence, addiction and diversion.    I believe that it is clinically appropriate for Anisha Quevedo to be prescribed this medication.    MEDICATION HISTORY  Quetiapine - ineffective & overly sedating    CURRENT MEDICATIONS    Current Outpatient Medications:     acetaminophen (Tylenol) 325 mg tablet, Take 1-2 tablets (325-650 mg) by mouth every 4 hours if needed for fever (temp greater than 38.0 C) (or pain). No more than 3000 mg per day, Disp: , Rfl:     albuterol 90 mcg/actuation inhaler, Inhale 2 puffs 4 times a day as needed., Disp: , Rfl:     alum-mag hydroxide-simeth (Maalox MAX) 400-400-40 mg/5 mL suspension, Take 15 mL by mouth once daily as needed., Disp: , Rfl:     ammonium lactate (Amlactin) 12 % cream, Apply 1 Application topically once daily. Apply to right heal, Disp: , Rfl:     budesonide (Rhinocort AQ) 32 mcg/actuation nasal spray, Administer 2 sprays into each nostril once daily., Disp: 8.6 g, Rfl: 11    budesonide-formoteroL (Symbicort) 80-4.5 mcg/actuation inhaler, Inhale 2 puffs 2 times a day., Disp: , Rfl:     cholecalciferol (Vitamin D3) 50 MCG (2000 UT) tablet, Take 1 tablet (50 mcg) by mouth once daily., Disp: 30 tablet, Rfl: 11    ciclopirox (Loprox) 0.77 % gel, Apply 1 Application topically once daily. Apply to right great toenail, Disp: , Rfl:     famotidine (Pepcid) 20 mg tablet, Take 1 tablet (20 mg) by mouth once daily. Take on an empty stomach, Disp: 90 tablet, Rfl: 0    ferrous sulfate 325 (65 Fe) MG tablet, Take 1 tablet by mouth once daily., Disp: , Rfl:     hydrOXYzine HCL  (Atarax) 50 mg tablet, Take 1 tablet (50 mg) by mouth once daily at bedtime., Disp: , Rfl:     inhaler,assist device,lg mask (AEROCHAMBER PLUS FLOW-VU,L MSK MISC), Use as directed with inhaler, Disp: , Rfl:     ipratropium-albuteroL (Duo-Neb) 0.5-2.5 mg/3 mL nebulizer solution, Inhale twice a day., Disp: , Rfl:     LORazepam (Ativan) 1 mg tablet, Take 1 tablet (1 mg) by mouth every 8 hours if needed (for severe anxiety and excessive reassurance-seeking that is not responsive to distraction and calming techniques)., Disp: , Rfl:     melatonin 10 mg tablet, Take 1 tablet (10 mg) by mouth once daily at bedtime., Disp: , Rfl:     metoprolol tartrate (Lopressor) 25 mg tablet, Take 1 tablet (25 mg) by mouth once daily., Disp: , Rfl:     metoprolol tartrate (Lopressor) 25 mg tablet, Take 0.5 tablets (12.5 mg) by mouth once daily., Disp: , Rfl:     montelukast (Singulair) 10 mg tablet, Take 1 tablet (10 mg) by mouth once daily at bedtime., Disp: , Rfl:     multivitamin tablet, Take 1 tablet by mouth every other day., Disp: , Rfl:     mupirocin (Bactroban) 2 % ointment, Apply a pea-size amount inside both nostrils once a day, Disp: , Rfl:     naproxen (Naprosyn) 125 mg/5 mL suspension, Take 15 mL (375 mg) by mouth 2 times a day as needed (for pain)., Disp: , Rfl:     NON FORMULARY, [place] Inst Breakfast Plus LIQD; Use as directed. Consume for breakfast, lunch, dinner, or snack PRN. If pt does not want to eat food., Disp: , Rfl:     omeprazole OTC (PriLOSEC OTC) 20 mg EC tablet, Take 1 tablet (20 mg) by mouth once daily. Take on an empty stomach, Disp: 90 tablet, Rfl: 0    Refresh Plus 0.5 % ophthalmic solution, Administer 1 drop into both eyes 2 times a day., Disp: , Rfl:     risperiDONE (RisperDAL) 0.5 mg tablet, Take 1 tablet (0.5 mg) by mouth 2 times a day. 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   Bx Specialist ?   Nicotine None   Alcohol None   Other drugs None     OBJECTIVE    Lab Results   Component Value Date    HGB 14.3 11/22/2023     11/22/2023    NEUTROABS 4.33 04/26/2022    GLUCOSE 96 11/22/2023     11/22/2023    K 4.6 11/22/2023    CO2 25 11/22/2023    CALCIUM 9.8 11/22/2023    CREATININE 0.40 (L) 11/22/2023    AST 17 11/22/2023    ALT 18 11/22/2023    TSH 0.20 (L) 11/22/2023    FREET4 0.86 11/22/2023    CHOL 173 11/22/2023    LDLF 78 10/07/2020    TRIG 37 11/22/2023    IJVYNEUK20 716 11/22/2023    VITD25 23 (L) 11/22/2023     Therapeutic Drug Monitoring  N/A    Electrocardiograms  05/19/2021: NSR, VR 80, QTc 405    MENTAL STATUS EXAM  General/Appearance: Appropriate dress/hygiene/grooming.  Attitude/Behavior: Actively engages with interview. Average eye contact. Calm/pleasant. Cooperative.  Speech/Communication: Normal volume/rate/tone.  Motor: No abnormal or involuntary movements observed. Spasticity.  Gait: In wheelchair.  Mood: Neutral.  Affect: Flat.  Withdrawn.  Thought processes: Organized/coherent.  Thought content: No delusions. Future-oriented.  Perception: Does not appear to be experiencing or responding to hallucinations. At times reports A/V hallucinations.  Sensorium/Cognition: Alert, awake, oriented to person/place/time. Intellectual impairment.  Memory: Not directly assessed at this time.  Insight: Fair.  Judgment: Fair. Adherent with prescribed/recommended treatment(s).    ASSESSMENT  Sounds like there may be a slight increase in psychotic symptoms lately.  Anisha has generally responded very well to risperidone.  We can try making a small increase and working  to reduce sleep medications to avoid making her excessively sleepy.    PROBLEM LIST  Intellectual developmental disorder, mild  Psychotic disorder  Anxiety disorder, r/o PTSD    PLAN  -- Increase risperidone to 0.5mg - 0.5mg - 3.5mg (AM, 14:00, HS) for psychosis  -- Decrease melatonin to 5mg at bedtime for sleep  -- Continue hydroxyzine 50mg at bedtime for sleep and anxiety  -- PRN: lorazepam 1mg Q8H  -- Follow up 10-12 weeks    Matt Stevens MD    Prep time on date of the patient encounter: 0 minutes   Time spent directly with patient/family/caregiver: 40 minutes   Additional time spent on patient care activities: 0 minutes   Documentation time: 10 minutes   Other time spent: 0 minutes   Total time on date of patient encounter: 50 minutes

## 2023-12-08 NOTE — TELEPHONE ENCOUNTER
Helen - [phone] - residential nurse at living facility ([facility])    Brian called to follow up regarding patient's chest xray to find out results and if anything further is needed.

## 2023-12-09 ASSESSMENT — ENCOUNTER SYMPTOMS
TREMORS: 0
ABDOMINAL DISTENTION: 0
CHOKING: 0
SEIZURES: 0
SLEEP DISTURBANCE: 1
PALPITATIONS: 0
COUGH: 0
TROUBLE SWALLOWING: 1
VOMITING: 1
DYSURIA: 0
CHEST TIGHTNESS: 1
HALLUCINATIONS: 1

## 2023-12-18 ENCOUNTER — TELEMEDICINE (OUTPATIENT)
Dept: BEHAVIORAL HEALTH | Facility: CLINIC | Age: 28
End: 2023-12-18
Payer: MEDICARE

## 2023-12-18 DIAGNOSIS — F41.9 ANXIETY: ICD-10-CM

## 2023-12-18 DIAGNOSIS — F70 MILD INTELLECTUAL DISABILITY: ICD-10-CM

## 2023-12-18 PROCEDURE — 90834 PSYTX W PT 45 MINUTES: CPT | Performed by: COUNSELOR
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
## 2024-01-05 ENCOUNTER — OFFICE VISIT (OUTPATIENT)
Dept: PRIMARY CARE | Facility: CLINIC | Age: 29
End: 2024-01-05
Payer: MEDICARE

## 2024-01-05 VITALS
DIASTOLIC BLOOD PRESSURE: 82 MMHG | OXYGEN SATURATION: 96 % | BODY MASS INDEX: 35.53 KG/M2 | SYSTOLIC BLOOD PRESSURE: 112 MMHG | WEIGHT: 170 LBS | HEART RATE: 83 BPM | RESPIRATION RATE: 16 BRPM | TEMPERATURE: 97.5 F

## 2024-01-05 DIAGNOSIS — R13.10 DYSPHAGIA, UNSPECIFIED TYPE: Primary | ICD-10-CM

## 2024-01-05 DIAGNOSIS — G80.8 CEREBRAL PALSY, QUADRIPLEGIC (MULTI): ICD-10-CM

## 2024-01-05 DIAGNOSIS — G82.50 SPASTIC QUADRIPLEGIA (MULTI): ICD-10-CM

## 2024-01-05 DIAGNOSIS — F20.0 PARANOID SCHIZOPHRENIA (MULTI): ICD-10-CM

## 2024-01-05 DIAGNOSIS — G80.0 CP (CEREBRAL PALSY), SPASTIC, QUADRIPLEGIC (MULTI): ICD-10-CM

## 2024-01-05 DIAGNOSIS — E66.01 SEVERE OBESITY (BMI 35.0-35.9 WITH COMORBIDITY) (MULTI): ICD-10-CM

## 2024-01-05 PROBLEM — J06.9 UPPER RESPIRATORY INFECTION: Status: RESOLVED | Noted: 2023-06-28 | Resolved: 2024-01-05

## 2024-01-05 PROBLEM — R05.9 COUGH IN ADULT: Status: RESOLVED | Noted: 2023-05-08 | Resolved: 2024-01-05

## 2024-01-05 PROBLEM — K29.60 REFLUX GASTRITIS: Status: RESOLVED | Noted: 2023-05-08 | Resolved: 2024-01-05

## 2024-01-05 PROBLEM — N89.8 VAGINAL ITCHING: Status: RESOLVED | Noted: 2023-06-28 | Resolved: 2024-01-05

## 2024-01-05 PROBLEM — R06.2 WHEEZING: Status: RESOLVED | Noted: 2023-06-28 | Resolved: 2024-01-05

## 2024-01-05 PROBLEM — R35.0 URINARY FREQUENCY: Status: RESOLVED | Noted: 2023-05-08 | Resolved: 2024-01-05

## 2024-01-05 PROCEDURE — 3008F BODY MASS INDEX DOCD: CPT | Performed by: INTERNAL MEDICINE

## 2024-01-05 PROCEDURE — 99213 OFFICE O/P EST LOW 20 MIN: CPT | Performed by: INTERNAL MEDICINE

## 2024-01-05 PROCEDURE — 1036F TOBACCO NON-USER: CPT | Performed by: INTERNAL MEDICINE

## 2024-01-05 NOTE — PROGRESS NOTES
Subjective   Patient ID: Anisha Quevedo is a 28 y.o. female who presents for Follow-up (6 month) and Lab Orders (Needs order for swallow test).    HPI     Here for followup  She was seen 2 months ago  Had this apt scheduled so came  Needs a swallow test  She has no concerns  Wants to lose weight  Watches diet and does diet25  States likes to snack with nutty butters and chocolate  Had CXR and was normal  Saw psych, pulmonary  Had meds adjusted with psych    Stomach issues much better. She is down to 1x per month of vomiting now. Used to be several a week    Review of Systems    Objective   /82   Pulse 83   Temp 36.4 °C (97.5 °F)   Resp 16   Wt 77.1 kg (170 lb)   SpO2 96%   BMI 35.53 kg/m²     Physical Exam  Constitutional:       Appearance: Normal appearance.   Cardiovascular:      Rate and Rhythm: Normal rate and regular rhythm.      Heart sounds: Normal heart sounds. No murmur heard.     No gallop.   Pulmonary:      Effort: Pulmonary effort is normal. No respiratory distress.      Breath sounds: No wheezing.   Abdominal:      Palpations: Abdomen is soft.   Musculoskeletal:      Right lower leg: No edema.      Left lower leg: No edema.   Neurological:      Mental Status: She is alert.      Comments: in wheelchair. Atrophy of lower legs.          Assessment/Plan   Problem List Items Addressed This Visit    None  Visit Diagnoses         Codes    Dysphagia, unspecified type    -  Primary R13.10    Relevant Orders    FL modified barium swallow study          Dysphagia-due for swallow eval     Schizophrenia: she is on risperdal  -off seroquel due to adverse side effects     Vomitting- advised to see GI again     Hx of Schatzki ring     Palpitations: holter neg  -continue metoprolol     RUQ pain: has gallstones and surgery stated not gallbladder. States if has pain-can consider MRI of pancreas as some fullness     Neck stiffness: naproxen as needed  -continue tens unit     Vaginal itching: seeing gyn  -comes and  goes     Headaches: ok to use tylenol or naproxen as needed with zofran if nausea     Primary Spastic quadriplegia CP  -has baclofen pump  -doing botox  -in wheelchair  -PT/OT, and ST  -hx of peg     Allergic rhinitis: on flonase  -sees ENT     Had unknown heart procedure: unclear and were not able to find out, normal echo 5/21     Dysphagia: no longer has peg     Vitamin D deficiency-start vitamin D     ASthma: sees Dr. Abarca  -on symbicort, albuterol, and Singulair     GERD: on omeperazole     Constipation: prn meds     Abnormal menses-SEES GYN       Abnormal TSH level- cont to follow, free t4 ok    Follows with DR. Rima Addison--gyn  GI- Dr. Nance     f/u 6 months.   UTD covid, tdap, pneumovax

## 2024-01-25 ENCOUNTER — TELEMEDICINE (OUTPATIENT)
Dept: BEHAVIORAL HEALTH | Facility: CLINIC | Age: 29
End: 2024-01-25
Payer: MEDICARE

## 2024-01-25 DIAGNOSIS — F70 MILD INTELLECTUAL DISABILITY: ICD-10-CM

## 2024-01-25 DIAGNOSIS — F41.9 ANXIETY: ICD-10-CM

## 2024-01-25 PROCEDURE — 90834 PSYTX W PT 45 MINUTES: CPT | Performed by: COUNSELOR

## 2024-01-25 NOTE — PROGRESS NOTES
Total Time: 39 min  Diagnosis: Anxiety (300.00) (F41.9), Mild intellectual disability (317) (F70)  Visit Type: via zoom  Reason for visit: managing her worry     - notes she has been “ok”, some days feeling good and others feeling really “nervous”  - she notes someone in the house  and that made her sad  - she also notes there is a new marlys moving in next month and she is pretty worried about the change  - she notes her sleep has not been good; related to roommate  - notes she is going to the [facility] to work out which is helping, she has been doing art at the [employer] which is also helping her; continues to love day hab    focused on:  - we talked about writing a list of the things she is worried about related to the new admission, and then create solutions for those worries. We also talked about asking staff for more information, is there a “new client 101” and can they create a calendar/timeline for his admission  - focusing on music, guided mediation, grounding techniques, mindfulness  - coping skills; affirmations

## 2024-02-06 ENCOUNTER — HOSPITAL ENCOUNTER (OUTPATIENT)
Dept: RADIOLOGY | Facility: HOSPITAL | Age: 29
Discharge: HOME | End: 2024-02-06
Payer: MEDICARE

## 2024-02-06 DIAGNOSIS — R13.10 DYSPHAGIA, UNSPECIFIED TYPE: ICD-10-CM

## 2024-02-06 DIAGNOSIS — R13.11 DYSPHAGIA, ORAL PHASE: Primary | Chronic | ICD-10-CM

## 2024-02-06 PROCEDURE — 74230 X-RAY XM SWLNG FUNCJ C+: CPT | Performed by: RADIOLOGY

## 2024-02-06 PROCEDURE — 74230 X-RAY XM SWLNG FUNCJ C+: CPT

## 2024-02-06 PROCEDURE — 2500000001 HC RX 250 WO HCPCS SELF ADMINISTERED DRUGS (ALT 637 FOR MEDICARE OP): Performed by: INTERNAL MEDICINE

## 2024-02-06 PROCEDURE — 92611 MOTION FLUOROSCOPY/SWALLOW: CPT | Mod: GN

## 2024-02-06 RX ADMIN — BARIUM SULFATE 45 ML: 0.81 POWDER, FOR SUSPENSION ORAL at 11:07

## 2024-02-06 RX ADMIN — BARIUM SULFATE 20 ML: 400 SUSPENSION ORAL at 11:06

## 2024-02-06 RX ADMIN — BARIUM SULFATE 10 ML: 400 PASTE ORAL at 11:06

## 2024-02-16 ENCOUNTER — TELEMEDICINE (OUTPATIENT)
Dept: BEHAVIORAL HEALTH | Facility: CLINIC | Age: 29
End: 2024-02-16
Payer: MEDICARE

## 2024-02-16 DIAGNOSIS — F70 MILD INTELLECTUAL DISABILITY: ICD-10-CM

## 2024-02-16 DIAGNOSIS — F41.9 ANXIETY: ICD-10-CM

## 2024-02-16 DIAGNOSIS — G47.09 OTHER INSOMNIA: ICD-10-CM

## 2024-02-16 DIAGNOSIS — F20.9 SCHIZOPHRENIA, UNSPECIFIED TYPE (MULTI): ICD-10-CM

## 2024-02-16 DIAGNOSIS — F20.0 PARANOID SCHIZOPHRENIA (MULTI): Primary | Chronic | ICD-10-CM

## 2024-02-16 DIAGNOSIS — G80.0 CP (CEREBRAL PALSY), SPASTIC, QUADRIPLEGIC (MULTI): ICD-10-CM

## 2024-02-16 PROCEDURE — 99215 OFFICE O/P EST HI 40 MIN: CPT | Performed by: PSYCHIATRY & NEUROLOGY

## 2024-02-16 PROCEDURE — 1036F TOBACCO NON-USER: CPT | Performed by: PSYCHIATRY & NEUROLOGY

## 2024-02-16 PROCEDURE — 3008F BODY MASS INDEX DOCD: CPT | Performed by: PSYCHIATRY & NEUROLOGY

## 2024-02-16 ASSESSMENT — ENCOUNTER SYMPTOMS
CHOKING: 0
SEIZURES: 0
TREMORS: 0
PALPITATIONS: 0
HALLUCINATIONS: 1
SLEEP DISTURBANCE: 1
DYSURIA: 0
ABDOMINAL DISTENTION: 0

## 2024-02-16 NOTE — PROGRESS NOTES
"Outpatient Psychiatry    A HIPAA-compliant interactive audio and video telecommunication system which permits real time communications between the patient (at the originating site) and provider (at the distant site) was utilized to provide this telehealth service.     The patient, family, caregivers and guardian (as appropriate) have provided consent on this date to conduct treatment via this telehealth service.  The patient's identity and physical location were verified at the time of this visit.      Present for appointment: Anisha and [facility] (Wilfrido).    SUBJECTIVE    No major or significant health problems for Anisha since we last met.  Saw PCP for routine follow-up on 1/05/24.    Had updated/repeat MBSS last week on 2/06/24: Regular diet with thin liquids. Small bites chew completely before swallowing.      Baclofen pump refilled on 12/21/23 and 1/24/24 without problems/complications.      Had a great time at [employer] dance last Friday.  Making more of an effort to get up to the [employer] once a week for exercise.  Attending [facility] on a regular basis and likes going there.    Wilfrido perceives that Anisha may be a bit more anxious lately.  They have noted an increase in wanting to seek out preferred staff and requesting repeated trips to the bathroom from them.  Anisha is not sure if she thinks she's more anxious.  Wilfrido notes they do have a new house-mate moving into the home in the near future.  She continues to see Zuly Delgado for therapy about every 2-3 weeks.  She has not used any as-needed lorazepam since our last appointment.     Staff have observed some ongoing self-talk or talking with others not present, even when out in the common areas of the house.  She experiences some occasional instances of \"seeing shadows,\" but these are not particular distressing for her.  She does not seem to be bringing up significant paranoid or persecutory delusional themes or " making accusations against staff.    She seems to be sleeping more in the afternoon upon returning home from day program.  She seems to sleep VERY soundly for a few hours (usually in her WC) and seems to have a bit of a hard time waking up.  Otherwise, she is typically sleeping well at night.  Usually into bed by 21:00 and asleep by 22:00.  No other changes in routine that could contribute to sleepiness in the afternoon.    She does not feel like she's having any EPS or other side effects from the risperidone.  No abnormal or involuntary movements are noted during our session today.     Review of Systems   HENT:  Negative for drooling and trouble swallowing.    Respiratory:  Negative for choking.    Cardiovascular:  Negative for palpitations.   Gastrointestinal:  Positive for nausea (GERD). Negative for abdominal distention and vomiting.   Genitourinary:  Negative for dysuria.   Neurological:  Negative for tremors and seizures.   Psychiatric/Behavioral:  Positive for hallucinations and sleep disturbance.       Controlled Substance Evaluation  OARRS/PDMP reviewed: Matt Stevens MD on 2/17/2024 10:16 AM  Is the patient prescribed a combination of a benzodiazepine and opioid? No  I have personally reviewed the OARRS report for Anisha Quevedo.   I have considered the risks of abuse, dependence, addiction and diversion.    I believe that it is clinically appropriate for Anisha Quevedo to be prescribed this medication.    MEDICATION HISTORY  Quetiapine - ineffective & overly sedating    CURRENT MEDICATIONS    Current Outpatient Medications:     acetaminophen (Tylenol) 325 mg tablet, Take 1-2 tablets (325-650 mg) by mouth every 4 hours if needed for fever (temp greater than 38.0 C) (or pain). No more than 3000 mg per day, Disp: , Rfl:     albuterol 90 mcg/actuation inhaler, Inhale 2 puffs 4 times a day as needed., Disp: , Rfl:     alum-mag hydroxide-simeth (Maalox MAX) 400-400-40 mg/5 mL suspension, Take 15 mL by mouth  once daily as needed., Disp: , Rfl:     ammonium lactate (Amlactin) 12 % cream, Apply 1 Application topically once daily. Apply to right heal, Disp: , Rfl:     budesonide (Rhinocort AQ) 32 mcg/actuation nasal spray, Administer 2 sprays into each nostril once daily., Disp: 8.6 g, Rfl: 11    budesonide-formoteroL (Symbicort) 80-4.5 mcg/actuation inhaler, Inhale 2 puffs 2 times a day., Disp: , Rfl:     cholecalciferol (Vitamin D3) 50 MCG (2000 UT) tablet, Take 1 tablet (50 mcg) by mouth once daily., Disp: 30 tablet, Rfl: 11    ciclopirox (Loprox) 0.77 % gel, Apply 1 Application topically once daily. Apply to right great toenail, Disp: , Rfl:     famotidine (Pepcid) 20 mg tablet, Take 1 tablet (20 mg) by mouth once daily. Take on an empty stomach, Disp: 90 tablet, Rfl: 0    ferrous sulfate 325 (65 Fe) MG tablet, Take 1 tablet by mouth once daily., Disp: , Rfl:     hydrOXYzine HCL (Atarax) 50 mg tablet, Take 1 tablet (50 mg) by mouth once daily at bedtime., Disp: 30 tablet, Rfl: 2    inhaler,assist device,lg mask (AEROCHAMBER PLUS FLOW-VU,L MSK MISC), Use as directed with inhaler, Disp: , Rfl:     ipratropium-albuteroL (Duo-Neb) 0.5-2.5 mg/3 mL nebulizer solution, Inhale twice a day., Disp: , Rfl:     LORazepam (Ativan) 1 mg tablet, Take 1 tablet (1 mg) by mouth every 8 hours if needed (for severe anxiety and excessive reassurance-seeking that is not responsive to distraction and calming techniques)., Disp: , Rfl:     melatonin 5 mg capsule, Take 1 capsule (5 mg) by mouth once daily at bedtime., Disp: 30 capsule, Rfl: 3    metoprolol tartrate (Lopressor) 25 mg tablet, Take 0.5 tablets (12.5 mg) by mouth 2 times a day., Disp: , Rfl:     montelukast (Singulair) 10 mg tablet, Take 1 tablet (10 mg) by mouth once daily at bedtime., Disp: , Rfl:     multivitamin tablet, Take 1 tablet by mouth every other day., Disp: , Rfl:     mupirocin (Bactroban) 2 % ointment, Apply a pea-size amount inside both nostrils once a day, Disp: ,  Rfl:     naproxen (Naprosyn) 125 mg/5 mL suspension, Take 15 mL (375 mg) by mouth 2 times a day as needed (for pain)., Disp: , Rfl:     NON FORMULARY, [place] Inst Breakfast Plus LIQD; Use as directed. Consume for breakfast, lunch, dinner, or snack PRN. If pt does not want to eat food., Disp: , Rfl:     omeprazole OTC (PriLOSEC OTC) 20 mg EC tablet, Take 1 tablet (20 mg) by mouth once daily. Take on an empty stomach, Disp: 90 tablet, Rfl: 0    Refresh Plus 0.5 % ophthalmic solution, Administer 1 drop into both eyes 2 times a day., Disp: , Rfl:     risperiDONE (RisperDAL) 0.5 mg tablet, Take 1 tablet (0.5 mg) by mouth 2 times a day. 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   Bx Specialist ?   Nicotine None   Alcohol None   Other drugs None     OBJECTIVE    Lab Results   Component Value Date    HGB 14.3 11/22/2023     11/22/2023    NEUTROABS 4.33 04/26/2022    GLUCOSE 96 11/22/2023     11/22/2023    K 4.6 11/22/2023    CO2 25 11/22/2023    CALCIUM 9.8 11/22/2023    CREATININE 0.40 (L) 11/22/2023    AST 17 11/22/2023    ALT 18 11/22/2023    TSH 0.20 (L) 11/22/2023    FREET4 0.86 11/22/2023    CHOL 173 11/22/2023    LDLF 78 10/07/2020    TRIG 37 11/22/2023    [id number] 716 11/22/2023    VITD25 23 (L) 11/22/2023     Therapeutic Drug Monitoring  N/A    Electrocardiograms  12/06/2023: NSR, VR 93, QTc 425  05/19/2021: NSR, VR 80, QTc  405    MENTAL STATUS EXAM  General/Appearance: Appropriate dress/hygiene/grooming.  Attitude/Behavior: Actively engages with interview. Average eye contact. Calm/pleasant. Cooperative.  Speech/Communication: Normal volume/rate/tone.  Motor: No abnormal or involuntary movements observed. Spasticity.  Gait: In wheelchair.  Mood: Appears to be in good spirits.  Affect: Congruent. Euthymic. Full range.  Thought processes: Organized/coherent.  Thought content: No delusions. Future-oriented.  Perception: Does not appear to be experiencing or responding to hallucinations. At times reports A/V hallucinations.  Sensorium/Cognition: Alert, awake, oriented to person/place/time. Intellectual impairment.  Memory: Not directly assessed at this time.  Insight: Fair.  Judgment: Fair. Adherent with prescribed/recommended treatment(s).    ASSESSMENT  Anisha is generally doing pretty well right now.  We'll continue to keep an eye on the afternoon sleepiness.  We could consider moving the afternoon dose of risperidone to bedtime if needed.    PROBLEM LIST  Intellectual developmental disorder, mild  Schizophrenia  Anxiety disorder, r/o PTSD    PLAN  -- Continue risperidone 0.5mg - 0.5mg - 3.5mg (AM, 14:00, HS) for psychosis  -- Continue melatonin 5mg at bedtime for sleep  -- Continue hydroxyzine 50mg at bedtime for sleep and anxiety  -- PRN: lorazepam 1mg Q8H  -- Follow up 10 weeks    Matt Stevens MD    Prep time on date of the patient encounter: 0 minutes   Time spent directly with patient/family/caregiver: 40 minutes   Additional time spent on patient care activities: 0 minutes   Documentation time: 10 minutes   Other time spent: 0 minutes   Total time on date of patient encounter: 50 minutes

## 2024-02-17 RX ORDER — RISPERIDONE 1 MG/1
0.5 TABLET ORAL NIGHTLY
Qty: 15 TABLET | Refills: 2 | Status: SHIPPED | OUTPATIENT
Start: 2024-02-17 | End: 2024-04-15 | Stop reason: SDUPTHER

## 2024-02-17 RX ORDER — MELATONIN 5 MG
5 CAPSULE ORAL NIGHTLY
Qty: 30 CAPSULE | Refills: 2 | Status: SHIPPED | OUTPATIENT
Start: 2024-02-17 | End: 2024-04-15 | Stop reason: SDUPTHER

## 2024-02-17 RX ORDER — RISPERIDONE 0.5 MG/1
TABLET ORAL
Qty: 60 TABLET | Refills: 2 | Status: SHIPPED | OUTPATIENT
Start: 2024-02-17 | End: 2024-04-15 | Stop reason: SDUPTHER

## 2024-02-17 RX ORDER — HYDROXYZINE HYDROCHLORIDE 50 MG/1
50 TABLET, FILM COATED ORAL NIGHTLY
Qty: 30 TABLET | Refills: 2 | Status: SHIPPED | OUTPATIENT
Start: 2024-02-17 | End: 2024-04-15 | Stop reason: SDUPTHER

## 2024-02-17 RX ORDER — RISPERIDONE 3 MG/1
3 TABLET ORAL NIGHTLY
Qty: 30 TABLET | Refills: 2 | Status: SHIPPED | OUTPATIENT
Start: 2024-02-17 | End: 2024-04-15 | Stop reason: SDUPTHER

## 2024-02-17 ASSESSMENT — ENCOUNTER SYMPTOMS
VOMITING: 0
NAUSEA: 1
TROUBLE SWALLOWING: 0

## 2024-02-17 NOTE — PATIENT INSTRUCTIONS
Anisha is generally doing pretty well right now.     We'll continue to keep an eye on the afternoon sleepiness. We could always consider moving the afternoon dose of risperidone to bedtime and removing the remaining melatonin if needed.     Next appointment: Monday 4/15/2024 at 4:45 PM in-person at [facility].

## 2024-02-21 ENCOUNTER — TELEMEDICINE (OUTPATIENT)
Dept: BEHAVIORAL HEALTH | Facility: CLINIC | Age: 29
End: 2024-02-21
Payer: MEDICARE

## 2024-02-21 DIAGNOSIS — F70 MILD INTELLECTUAL DISABILITY: ICD-10-CM

## 2024-02-21 DIAGNOSIS — F41.9 ANXIETY: ICD-10-CM

## 2024-02-21 PROCEDURE — 1036F TOBACCO NON-USER: CPT | Performed by: COUNSELOR

## 2024-02-21 PROCEDURE — 3008F BODY MASS INDEX DOCD: CPT | Performed by: COUNSELOR

## 2024-02-21 PROCEDURE — 90834 PSYTX W PT 45 MINUTES: CPT | Performed by: COUNSELOR

## 2024-02-21 NOTE — PROGRESS NOTES
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)  Visit Type: via zoom   Reason for visit: managing her worry     - notes she got her nails clipped today and it felt good  - notes she has been watching Netflix a lot lately, love is blind, she likes to think about people getting ; we talked some about her desire to still get   - she notes she went to [employer] to [employer] but ended up getting sick so they left early  - notes the new marlys will move in on 03/04 and she does feel better; overall her anxiety is better  - talked about her sleep some  - she seemed a little slower in her responses today and wanted to ask more questions about the clinician than share about what she is doing    focused on:  - continued to work on her nerves related to the new move in, talk to staff, use a calendar, get a get to know me sheet  - sleep hygiene, what she can do in the middle of the night (white noise, rain sounds, music, reduce her naps)  - focusing on music, guided mediation, grounding techniques, mindfulness  - coping skills; affirmations

## 2024-03-04 ENCOUNTER — TELEPHONE (OUTPATIENT)
Dept: PRIMARY CARE | Facility: CLINIC | Age: 29
End: 2024-03-04
Payer: COMMERCIAL

## 2024-03-04 ENCOUNTER — LAB (OUTPATIENT)
Dept: LAB | Facility: LAB | Age: 29
End: 2024-03-04
Payer: MEDICARE

## 2024-03-04 DIAGNOSIS — N39.0 URINARY TRACT INFECTION WITHOUT HEMATURIA, SITE UNSPECIFIED: ICD-10-CM

## 2024-03-04 LAB
APPEARANCE UR: CLEAR
BILIRUB UR STRIP.AUTO-MCNC: NEGATIVE MG/DL
COLOR UR: YELLOW
GLUCOSE UR STRIP.AUTO-MCNC: NEGATIVE MG/DL
KETONES UR STRIP.AUTO-MCNC: NEGATIVE MG/DL
LEUKOCYTE ESTERASE UR QL STRIP.AUTO: NEGATIVE
NITRITE UR QL STRIP.AUTO: NEGATIVE
PH UR STRIP.AUTO: 6 [PH]
PROT UR STRIP.AUTO-MCNC: NEGATIVE MG/DL
RBC # UR STRIP.AUTO: NEGATIVE /UL
SP GR UR STRIP.AUTO: 1.02
UROBILINOGEN UR STRIP.AUTO-MCNC: <2 MG/DL

## 2024-03-04 PROCEDURE — 87086 URINE CULTURE/COLONY COUNT: CPT

## 2024-03-04 PROCEDURE — 81003 URINALYSIS AUTO W/O SCOPE: CPT

## 2024-03-04 NOTE — TELEPHONE ENCOUNTER
[facility] is calling to see if a order can be placed for a UTI.    She has been asking to go to the bathroom a lot.    Please advise.    Questions call Helen [phone]

## 2024-03-05 LAB — BACTERIA UR CULT: NORMAL

## 2024-03-22 ENCOUNTER — OFFICE VISIT (OUTPATIENT)
Dept: BEHAVIORAL HEALTH | Facility: CLINIC | Age: 29
End: 2024-03-22
Payer: MEDICARE

## 2024-03-22 DIAGNOSIS — F41.9 ANXIETY: ICD-10-CM

## 2024-03-22 DIAGNOSIS — F70 MILD INTELLECTUAL DISABILITY: ICD-10-CM

## 2024-03-22 PROCEDURE — 1036F TOBACCO NON-USER: CPT | Performed by: COUNSELOR

## 2024-03-22 PROCEDURE — 3008F BODY MASS INDEX DOCD: CPT | Performed by: COUNSELOR

## 2024-03-22 PROCEDURE — 90834 PSYTX W PT 45 MINUTES: CPT | Performed by: COUNSELOR

## 2024-03-22 NOTE — PROGRESS NOTES
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)  Visit Type: via zoom  Reason for visit: managing her worry     - talked some about the new house mate and how loud he is, its been a lot of change  - notes she has been planning events like a 'sip and paint' a game night tonight  - notes she saw her brothers recently which really made her happy  - when asked her staff noted that some of her anxiety is back up, more of the “nervous tic” like clicking her tongue or asking for help with things that she normally doesn't need help with  - [employer] to work out which she notes really helps; did talk some about her weight, a doc told her she was gaining too much weight which does worry her (reports being really tired)    focused on:  - sleep hygiene (white noise, rain sounds, music, reduce her naps)  - focusing on music, guided mediation, grounding techniques, mindfulness  - coping skills; affirmations

## 2024-04-15 ENCOUNTER — OFFICE VISIT (OUTPATIENT)
Dept: BEHAVIORAL HEALTH | Facility: CLINIC | Age: 29
End: 2024-04-15
Payer: MEDICARE

## 2024-04-15 DIAGNOSIS — G82.50 SPASTIC QUADRIPLEGIA (MULTI): ICD-10-CM

## 2024-04-15 DIAGNOSIS — G47.09 OTHER INSOMNIA: ICD-10-CM

## 2024-04-15 DIAGNOSIS — F20.9 SCHIZOPHRENIA, UNSPECIFIED TYPE (MULTI): Primary | ICD-10-CM

## 2024-04-15 DIAGNOSIS — G80.0 CP (CEREBRAL PALSY), SPASTIC, QUADRIPLEGIC (MULTI): ICD-10-CM

## 2024-04-15 DIAGNOSIS — F70 MILD INTELLECTUAL DISABILITY: ICD-10-CM

## 2024-04-15 DIAGNOSIS — F41.9 ANXIETY: ICD-10-CM

## 2024-04-15 PROCEDURE — 3008F BODY MASS INDEX DOCD: CPT | Performed by: PSYCHIATRY & NEUROLOGY

## 2024-04-15 PROCEDURE — 99215 OFFICE O/P EST HI 40 MIN: CPT | Performed by: PSYCHIATRY & NEUROLOGY

## 2024-04-15 PROCEDURE — 1036F TOBACCO NON-USER: CPT | Performed by: PSYCHIATRY & NEUROLOGY

## 2024-04-15 RX ORDER — HYDROXYZINE HYDROCHLORIDE 50 MG/1
50 TABLET, FILM COATED ORAL NIGHTLY
Qty: 30 TABLET | Refills: 2 | Status: ON HOLD | OUTPATIENT
Start: 2024-04-15

## 2024-04-15 RX ORDER — MELATONIN 5 MG
5 CAPSULE ORAL NIGHTLY
Qty: 30 CAPSULE | Refills: 2 | Status: ON HOLD | OUTPATIENT
Start: 2024-04-15

## 2024-04-15 RX ORDER — RISPERIDONE 3 MG/1
3 TABLET ORAL NIGHTLY
Qty: 30 TABLET | Refills: 2 | Status: ON HOLD | OUTPATIENT
Start: 2024-04-15

## 2024-04-15 RX ORDER — RISPERIDONE 0.5 MG/1
TABLET ORAL
Qty: 60 TABLET | Refills: 2 | Status: ON HOLD | OUTPATIENT
Start: 2024-04-15

## 2024-04-15 RX ORDER — RISPERIDONE 1 MG/1
0.5 TABLET ORAL NIGHTLY
Qty: 15 TABLET | Refills: 2 | Status: ON HOLD | OUTPATIENT
Start: 2024-04-15 | End: 2024-07-14

## 2024-04-15 ASSESSMENT — ENCOUNTER SYMPTOMS
HALLUCINATIONS: 0
TREMORS: 0
DIZZINESS: 0
SLEEP DISTURBANCE: 0
CHOKING: 0
TROUBLE SWALLOWING: 0
SEIZURES: 0
ABDOMINAL DISTENTION: 0
DYSURIA: 0
NAUSEA: 0
PALPITATIONS: 0
VOMITING: 0

## 2024-04-15 NOTE — PROGRESS NOTES
"Outpatient Psychiatry    A HIPAA-compliant interactive audio and video telecommunication system which permits real time communications between the patient (at the originating site) and provider (at the distant site) was utilized to provide this telehealth service.     The patient, family, caregivers and guardian (as appropriate) have provided consent on this date to conduct treatment via this telehealth service.  The patient's identity and physical location were verified at the time of this visit.      Present for appointment: Anisha and [facility] (Wilfrido).    SUBJECTIVE    Appointment changed to virtual.      Overall, Anisha has been doing fairly well since we last met.      She has not had any significant health problems.      Mood has been good and anxiety has been manageable.  When anxious she will often seek out preferred staff and request that they alone help assist her to use the bathroom.      She does not seem to have any increase in paranoid or delusional thinking.      It has been > 1 year since she last used any as-needed medication for anxiety.      She continues to see Zuly Delgado for therapy about every 3-4 weeks.      She seems to be adjusting OK to the new younger male house-mate that has moved in.  She is in charge of planning monthly social events for the house and Wilfrido sees her as being more \"confident\" in herself lately.  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      Seems to be sleeping well at night and not sleeping as much in the afternoons.      No abnormal or involuntary movements are noted during our session today.    Additional time was spent during today's appointment in communication with Anisha discussing:  > psychoeducation about illness and expected course/prognosis  > contribution of family and/or situational stressors  > behavioral or environmental changes to address problem(s)   "   Review of Systems   HENT:  Negative for drooling and trouble swallowing.    Respiratory:  Negative for choking.    Cardiovascular:  Negative for palpitations.   Gastrointestinal:  Negative for abdominal distention, nausea and vomiting.   Genitourinary:  Negative for dysuria.   Neurological:  Negative for dizziness, tremors and seizures.   Psychiatric/Behavioral:  Negative for hallucinations and sleep disturbance.       Controlled Substance Evaluation  OARRS/PDMP reviewed: Matt Stevens MD on 4/15/2024  7:18 AM  I have personally reviewed the OARRS report for Anisha Quevedo.     MEDICATION HISTORY  Quetiapine - ineffective & overly sedating    CURRENT MEDICATIONS    Current Outpatient Medications:     acetaminophen (Tylenol) 325 mg tablet, Take 1-2 tablets (325-650 mg) by mouth every 4 hours if needed for fever (temp greater than 38.0 C) (or pain). No more than 3000 mg per day, Disp: , Rfl:     albuterol 90 mcg/actuation inhaler, Inhale 2 puffs 4 times a day as needed., Disp: , Rfl:     alum-mag hydroxide-simeth (Maalox MAX) 400-400-40 mg/5 mL suspension, Take 15 mL by mouth once daily as needed., Disp: , Rfl:     ammonium lactate (Amlactin) 12 % cream, Apply 1 Application topically once daily. Apply to right heal, Disp: , Rfl:     budesonide (Rhinocort AQ) 32 mcg/actuation nasal spray, Administer 2 sprays into each nostril once daily., Disp: 8.6 g, Rfl: 11    budesonide-formoteroL (Symbicort) 80-4.5 mcg/actuation inhaler, Inhale 2 puffs 2 times a day., Disp: , Rfl:     cholecalciferol (Vitamin D3) 50 MCG (2000 UT) tablet, Take 1 tablet (50 mcg) by mouth once daily., Disp: 30 tablet, Rfl: 11    ciclopirox (Loprox) 0.77 % gel, Apply 1 Application topically once daily. Apply to right great toenail, Disp: , Rfl:     famotidine (Pepcid) 20 mg tablet, Take 1 tablet (20 mg) by mouth once daily. Take on an empty stomach, Disp: 90 tablet, Rfl: 0    ferrous sulfate 325 (65 Fe) MG tablet, Take 1 tablet by mouth once  daily., Disp: , Rfl:     hydrOXYzine HCL (Atarax) 50 mg tablet, Take 1 tablet (50 mg) by mouth once daily at bedtime., Disp: 30 tablet, Rfl: 2    inhaler,assist device,lg mask (AEROCHAMBER PLUS FLOW-VU,L MSK MISC), Use as directed with inhaler, Disp: , Rfl:     ipratropium-albuteroL (Duo-Neb) 0.5-2.5 mg/3 mL nebulizer solution, Inhale twice a day., Disp: , Rfl:     melatonin 5 mg capsule, Take 1 capsule (5 mg) by mouth once daily at bedtime., Disp: 30 capsule, Rfl: 2    metoprolol tartrate (Lopressor) 25 mg tablet, Take 0.5 tablets (12.5 mg) by mouth 2 times a day., Disp: , Rfl:     montelukast (Singulair) 10 mg tablet, Take 1 tablet (10 mg) by mouth once daily at bedtime., Disp: , Rfl:     multivitamin tablet, Take 1 tablet by mouth every other day., Disp: , Rfl:     mupirocin (Bactroban) 2 % ointment, Apply a pea-size amount inside both nostrils once a day, Disp: , Rfl:     naproxen (Naprosyn) 125 mg/5 mL suspension, Take 15 mL (375 mg) by mouth 2 times a day as needed (for pain)., Disp: , Rfl:     omeprazole OTC (PriLOSEC OTC) 20 mg EC tablet, Take 1 tablet (20 mg) by mouth once daily. Take on an empty stomach, Disp: 90 tablet, Rfl: 0    Refresh Plus 0.5 % ophthalmic solution, Administer 1 drop into both eyes 2 times a day., Disp: , Rfl:     risperiDONE (RisperDAL) 0.5 mg tablet, Take 1 tablet (0.5 mg) by mouth twice daily - morning and 2pm., Disp: 60 tablet, Rfl: 2    risperiDONE (RisperDAL) 1 mg tablet, Take 0.5 tablets (0.5 mg) by mouth once daily at bedtime., Disp: 15 tablet, Rfl: 2    risperiDONE (RisperDAL) 3 mg tablet, Take 1 tablet (3 mg) by mouth once daily at bedtime., Disp: 30 tablet, Rfl: 2    sodium chloride (Nasal Moisturizing) 0.65 % nasal spray, Administer 1 spray into each nostril 2 times a day as needed (for dryness)., Disp: , Rfl:     SOCIAL HISTORY  Living situation [facility]   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   Bx Specialist ?   Nicotine None   Alcohol None   Other drugs None     OBJECTIVE    Lab Results   Component Value Date    HGB 14.3 11/22/2023     11/22/2023    NEUTROABS 4.33 04/26/2022    GLUCOSE 96 11/22/2023     11/22/2023    K 4.6 11/22/2023    CO2 25 11/22/2023    CALCIUM 9.8 11/22/2023    CREATININE 0.40 (L) 11/22/2023    AST 17 11/22/2023    ALT 18 11/22/2023    TSH 0.20 (L) 11/22/2023    FREET4 0.86 11/22/2023    CHOL 173 11/22/2023    LDLF 78 10/07/2020    TRIG 37 11/22/2023    VLMRWCEN70 716 11/22/2023    VITD25 23 (L) 11/22/2023     Therapeutic Drug Monitoring  N/A    Electrocardiograms  12/06/2023: NSR, VR 93, QTc 425  05/19/2021: NSR, VR 80, QTc 405    MENTAL STATUS EXAM  General/Appearance: Appropriate dress/hygiene/grooming.  Attitude/Behavior: Actively engages with interview. Average eye contact. Calm/pleasant. Cooperative.  Speech/Communication: Normal volume/rate/tone.  Motor: No abnormal or involuntary movements observed. Spasticity.  Gait: In wheelchair.  Mood: Appears to be in good spirits.  Affect: Congruent. Euthymic. Full range.  Thought processes: Organized/coherent.  Thought content: No delusions. Future-oriented.  Perception: Does not appear to be experiencing or responding to hallucinations.  Sensorium/Cognition: Alert, awake, oriented to person/place/time. Intellectual impairment.  Memory: Not directly assessed at this time.  Insight: Fair.  Judgment: Fair. Adherent with prescribed/recommended treatment(s).    ASSESSMENT  Anisha has continued to do really well over the past few months.  She is very active at home and in the community.  She seems to be in a good mood and psychotic symptoms are generally well-controlled.  She has occasional periods of increased anxiety, but has done an excellent job of managing those problems by utilizing non-pharmacological coping strategies.    PROBLEM LIST  Intellectual developmental disorder,  mild  Schizophrenia  Anxiety disorder, r/o PTSD    PLAN  -- Continue risperidone 0.5mg - 0.5mg - 3.5mg (AM, 14:00, HS) for psychosis  -- Continue melatonin 5mg at bedtime for sleep  -- Continue hydroxyzine 50mg at bedtime for sleep and anxiety  -- OK to discontinue PRN lorazepam  -- Follow up 10 weeks    Matt Stevens MD    Prep time on date of the patient encounter: 5 minutes   Time spent directly with patient/family/caregiver: 40 minutes   Additional time spent on patient care activities: 0 minutes   Documentation time: 10 minutes   Other time spent: 0 minutes   Total time on date of patient encounter: 55 minutes

## 2024-04-15 NOTE — PATIENT INSTRUCTIONS
Anisha has continued to do really well over the past few months.  She is very active at home and in the community.  She seems to be in a good mood and psychotic symptoms are generally well-controlled.  She has occasional periods of increased anxiety, but has done an excellent job of managing those problems by utilizing non-pharmacological coping strategies.    DISCONTINUE order for PRN lorazepam.    Next appointment: Monday 7/01/2024 at 11:15 AM at [facility].

## 2024-04-23 ENCOUNTER — TELEMEDICINE (OUTPATIENT)
Dept: BEHAVIORAL HEALTH | Facility: CLINIC | Age: 29
End: 2024-04-23
Payer: MEDICARE

## 2024-04-23 DIAGNOSIS — F41.9 ANXIETY: ICD-10-CM

## 2024-04-23 DIAGNOSIS — F70 MILD INTELLECTUAL DISABILITY: ICD-10-CM

## 2024-04-23 PROCEDURE — 3008F BODY MASS INDEX DOCD: CPT | Performed by: COUNSELOR

## 2024-04-23 PROCEDURE — 90834 PSYTX W PT 45 MINUTES: CPT | Performed by: COUNSELOR

## 2024-04-23 NOTE — PROGRESS NOTES
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)  Visit Type: via zoom   Reason for visit: managing her worry    - notes she is a bit under the weather, bit of a cold and a headache  - notes she continues to enjoy work but there are a lot of different clients there and she feels like she is struggling to connect with them or not be overwhelmed  - notes she takes on a lot others feelings which can stress her out; did note she has not been seeing the shadows as much   - did note positives, went to the mall, had her paint and sip and things in the house are going ok    focused on:  - how am I feeling, why am I feeling this way, and what action do I need to take  - sleep hygiene (white noise, rain sounds, music, reduce her naps)  - focusing on music, guided mediation, grounding techniques, mindfulness  - coping skills; affirmations

## 2024-05-03 ENCOUNTER — HOSPITAL ENCOUNTER (OUTPATIENT)
Dept: RADIOLOGY | Facility: CLINIC | Age: 29
Discharge: HOME | End: 2024-05-03
Payer: MEDICARE

## 2024-05-03 ENCOUNTER — OFFICE VISIT (OUTPATIENT)
Dept: PRIMARY CARE | Facility: CLINIC | Age: 29
End: 2024-05-03
Payer: MEDICARE

## 2024-05-03 ENCOUNTER — TELEPHONE (OUTPATIENT)
Dept: PRIMARY CARE | Facility: CLINIC | Age: 29
End: 2024-05-03

## 2024-05-03 VITALS
OXYGEN SATURATION: 91 % | BODY MASS INDEX: 35.74 KG/M2 | SYSTOLIC BLOOD PRESSURE: 125 MMHG | WEIGHT: 171 LBS | TEMPERATURE: 98.1 F | DIASTOLIC BLOOD PRESSURE: 82 MMHG | HEART RATE: 95 BPM

## 2024-05-03 DIAGNOSIS — R05.1 ACUTE COUGH: Primary | ICD-10-CM

## 2024-05-03 DIAGNOSIS — R05.1 ACUTE COUGH: ICD-10-CM

## 2024-05-03 PROCEDURE — 1036F TOBACCO NON-USER: CPT | Performed by: INTERNAL MEDICINE

## 2024-05-03 PROCEDURE — 71046 X-RAY EXAM CHEST 2 VIEWS: CPT | Performed by: RADIOLOGY

## 2024-05-03 PROCEDURE — 99213 OFFICE O/P EST LOW 20 MIN: CPT | Performed by: INTERNAL MEDICINE

## 2024-05-03 PROCEDURE — 3008F BODY MASS INDEX DOCD: CPT | Performed by: INTERNAL MEDICINE

## 2024-05-03 PROCEDURE — 71046 X-RAY EXAM CHEST 2 VIEWS: CPT

## 2024-05-03 PROCEDURE — 87635 SARS-COV-2 COVID-19 AMP PRB: CPT

## 2024-05-03 RX ORDER — IPRATROPIUM BROMIDE AND ALBUTEROL SULFATE 2.5; .5 MG/3ML; MG/3ML
3 SOLUTION RESPIRATORY (INHALATION)
Qty: 180 ML | Refills: 0 | Status: SHIPPED | OUTPATIENT
Start: 2024-05-03 | End: 2024-05-10 | Stop reason: SDUPTHER

## 2024-05-03 RX ORDER — DOXYCYCLINE 100 MG/1
100 CAPSULE ORAL 2 TIMES DAILY
Qty: 14 CAPSULE | Refills: 0 | Status: SHIPPED | OUTPATIENT
Start: 2024-05-03 | End: 2024-05-10 | Stop reason: ALTCHOICE

## 2024-05-03 RX ORDER — PREDNISONE 20 MG/1
40 TABLET ORAL DAILY
Qty: 10 TABLET | Refills: 0 | Status: SHIPPED | OUTPATIENT
Start: 2024-05-03 | End: 2024-05-10 | Stop reason: ALTCHOICE

## 2024-05-03 RX ORDER — OMEPRAZOLE 20 MG/1
20 CAPSULE, DELAYED RELEASE ORAL
Status: ON HOLD | COMMUNITY
Start: 2024-05-01

## 2024-05-03 NOTE — PROGRESS NOTES
Subjective   Patient ID: Anisha Quevedo is a 28 y.o. female who presents for Follow-up (Patient is here for a follow up on cough, tested negative for covid.).    HPI     She has been sick x 1.5 weeks. Muscle aches, headaches, postnasal drip. She is coughing.  Using inhalers more often    +sore throat  +vomitting from mucus  +nausea  No ear pain  Not getting better  Tired and some SOB    Review of Systems   All other systems reviewed and are negative.      Objective   /82 (BP Location: Left arm, Patient Position: Sitting, BP Cuff Size: Small adult) Comment: the sound was very fainted  Pulse 95   Temp 36.7 °C (98.1 °F)   Wt 77.6 kg (171 lb) Comment: April 1st last taken  SpO2 91%   BMI 35.74 kg/m²     Physical Exam  Constitutional:       Appearance: Normal appearance.   HENT:      Right Ear: Tympanic membrane normal.      Left Ear: Tympanic membrane normal.      Mouth/Throat:      Mouth: Mucous membranes are dry.      Pharynx: No oropharyngeal exudate or posterior oropharyngeal erythema.   Cardiovascular:      Rate and Rhythm: Normal rate and regular rhythm.      Heart sounds: Normal heart sounds. No murmur heard.     No gallop.   Pulmonary:      Effort: Pulmonary effort is normal. No respiratory distress.      Breath sounds: Wheezing and rhonchi (scattered) present.   Musculoskeletal:      Right lower leg: No edema.      Left lower leg: No edema.   Neurological:      Mental Status: She is alert.         Assessment/Plan   Problem List Items Addressed This Visit    None  Visit Diagnoses         Codes    Acute cough    -  Primary R05.1    Relevant Medications    doxycycline (Vibramycin) 100 mg capsule    predniSONE (Deltasone) 20 mg tablet    Other Relevant Orders    XR chest 2 views    Sars-CoV-2 PCR             Acute cough  -concern for PNA  -doxy and prednisone with length and asthma  -covid swab today  -if worsening to ER  -recheck 1 week  -advised if pulse ox <90% or Anisha feeling worse to go to ER

## 2024-05-03 NOTE — TELEPHONE ENCOUNTER
Dr. Garcia advised pt to increase the Duoned to 4x Day and a new script will need to be sent to [facility]

## 2024-05-03 NOTE — PATIENT INSTRUCTIONS
I am concerned Anisha has pneumonia  We are doing an xray today    I am starting her on doxycyline 1 tab twice a day for 1 week  Prednisone 1 tab twice a day for lungs and asthma for 5 bdays    Schedule her Duonebs 4 times a day for 1 week    Check pulse ox daily. If below 90%, she needs to go to ER. She is at 91% here today.

## 2024-05-04 LAB — SARS-COV-2 RNA RESP QL NAA+PROBE: NOT DETECTED

## 2024-05-06 NOTE — OP NOTE
PREOPERATIVE DIAGNOSIS:  1. Cerebral palsy.  2. Spastic quadriplegia.  3. End of baclofen pump battery life.    POSTOPERATIVE DIAGNOSIS:  1. Cerebral palsy.  2. Spastic quadriplegia.  3. End of baclofen pump battery life.    OPERATION/PROCEDURE:  1. Placement of baclofen pump.  2. Revision of intrathecal catheter.    SURGEON:  Addis Mak MD.    ASSISTANT(S):  None.    ANESTHESIA:  General via LMA.    COMPLICATIONS:  None.    SPECIMENS:  None.    IMPLANTS:  SynchroMed II baclofen pump (20 mL) with new sutureless connector  Indura catheter.     CONDITION ON TRANSFER:  Stable.    HISTORY:  The patient is a 27-year-old female with a history of cerebral palsy  spastic quadriplegia.  She has a baclofen pump, which is no longer  working because it is at the end of its battery life.  We have  discussed options with her previously and elected to proceed with  pump replacement.  Consents were signed, witnessed, and placed in  chart.     DESCRIPTION OF OPERATION:  The patient was identified in the preop holding area.  The operative  site, her back, was marked.  The patient was taken to the operating  room on a gurney and underwent general anesthesia.  Her abdomen was  prepped and draped in the usual sterile fashion.  We opened the old  pocket after antibiotics were given and dissected down through the  soft tissues with Bovie electrocautery.  The old pump was identified  and brought out of the pocket.  The sutureless connector could not be  detached from the pump, so it was cut off the old catheter and a new  sutureless connector was added with the pin-to-pin connector.  The  new pump was implanted deeply in the pocket and the pocket was closed  with 0 Vicryl, 2-0 Vicryl, and 4-0 Monocryl.  Dressings were applied  including Steri-Strips, Xeroform, Telfa, and Tegaderm dressings.  When this was complete, she was awakened from anesthesia, breathing  spontaneously, transferred to her bed, and taken to the recovery  room  in stable condition.  There were no immediate operative or  postoperative complications.  Sponge, needle counts were correct x3  at the end of the case.  I was present for and performed the entire  procedure.     We will see her back in clinic in 1 to 2 weeks for a wound check.  She will keep her dressing on until that time.       Addis Mak MD    DD:  05/05/2023 12:51:12 EST  DT:  05/05/2023 13:02:55 EST  DICTATION NUMBER:  [id number]  INTERNAL JOB NUMBER:  [id number]    CC:  Addis Mak MD, Fax: [phone]        Electronic Signatures:  Addis Mak (MD) (Signed on 08-May-2023 16:09)   Authored  Unsigned, Draft (SYS GENERATED) (Entered on 05-May-2023 13:02)   Entered    Last Updated: 08-May-2023 16:09 by Addis Mak)

## 2024-05-09 ENCOUNTER — APPOINTMENT (OUTPATIENT)
Dept: PRIMARY CARE | Facility: CLINIC | Age: 29
End: 2024-05-09
Payer: MEDICARE

## 2024-05-10 ENCOUNTER — OFFICE VISIT (OUTPATIENT)
Dept: PRIMARY CARE | Facility: CLINIC | Age: 29
End: 2024-05-10
Payer: MEDICARE

## 2024-05-10 VITALS
SYSTOLIC BLOOD PRESSURE: 121 MMHG | DIASTOLIC BLOOD PRESSURE: 84 MMHG | OXYGEN SATURATION: 95 % | TEMPERATURE: 98.6 F | HEART RATE: 100 BPM | RESPIRATION RATE: 16 BRPM

## 2024-05-10 DIAGNOSIS — R05.1 ACUTE COUGH: ICD-10-CM

## 2024-05-10 PROCEDURE — G2211 COMPLEX E/M VISIT ADD ON: HCPCS | Performed by: INTERNAL MEDICINE

## 2024-05-10 PROCEDURE — 1036F TOBACCO NON-USER: CPT | Performed by: INTERNAL MEDICINE

## 2024-05-10 PROCEDURE — 99213 OFFICE O/P EST LOW 20 MIN: CPT | Performed by: INTERNAL MEDICINE

## 2024-05-10 PROCEDURE — 3008F BODY MASS INDEX DOCD: CPT | Performed by: INTERNAL MEDICINE

## 2024-05-10 RX ORDER — IPRATROPIUM BROMIDE AND ALBUTEROL SULFATE 2.5; .5 MG/3ML; MG/3ML
3 SOLUTION RESPIRATORY (INHALATION)
Qty: 180 ML | Refills: 0 | Status: ON HOLD | OUTPATIENT
Start: 2024-05-10

## 2024-05-10 NOTE — PROGRESS NOTES
Subjective   Patient ID: Anisha Quevedo is a 28 y.o. female who presents for Follow-up.    HPI     Here for followup  Feeling better  Breathing treatments are helping  Finished steroid and antibiotics  Has the cough still  Dry but feels wet to her    Feeling better    Review of Systems   All other systems reviewed and are negative.      Objective   /84 (BP Location: Left arm, Patient Position: Sitting, BP Cuff Size: Large adult)   Pulse 100   Temp 37 °C (98.6 °F)   Resp 16   SpO2 95%     Physical Exam  Constitutional:       Appearance: Normal appearance.   HENT:      Right Ear: Tympanic membrane normal.      Left Ear: Tympanic membrane normal.      Mouth/Throat:      Mouth: Mucous membranes are dry.      Pharynx: No oropharyngeal exudate or posterior oropharyngeal erythema.   Cardiovascular:      Rate and Rhythm: Normal rate and regular rhythm.      Heart sounds: Normal heart sounds. No murmur heard.     No gallop.   Pulmonary:      Effort: Pulmonary effort is normal. No respiratory distress.      Breath sounds: Normal breath sounds. No wheezing or rhonchi.   Musculoskeletal:      Right lower leg: No edema.      Left lower leg: No edema.   Neurological:      Mental Status: She is alert.         Assessment/Plan   Problem List Items Addressed This Visit    None  Visit Diagnoses         Codes    Acute cough     R05.1    Relevant Medications    ipratropium-albuteroL (Duo-Neb) 0.5-2.5 mg/3 mL nebulizer solution          Acute cough  -bronchitis resolving  -lungs clear  -continue nebs 4x per day for another 2 weeks  -sees pulmonary next month  -if cough worsening or breathing worsens-call  -may need asthma meds adjusted but sees pulmonary next month

## 2024-05-10 NOTE — PATIENT INSTRUCTIONS
Anisha's pulse ox is good  Lungs sound MUCH better  No need for further antibiotics or steroids  We will continue the duonebs 4 times a day for the next 2 weeks. If Anisha feels like her breathing is ok without them, let me know and we can discontinue.    She sees pulmonary next month and if still having the cough, may need asthma meds adjusted.

## 2024-05-21 ENCOUNTER — TELEMEDICINE (OUTPATIENT)
Dept: BEHAVIORAL HEALTH | Facility: CLINIC | Age: 29
End: 2024-05-21
Payer: MEDICARE

## 2024-05-21 ENCOUNTER — APPOINTMENT (OUTPATIENT)
Dept: PRIMARY CARE | Facility: CLINIC | Age: 29
End: 2024-05-21
Payer: MEDICARE

## 2024-05-21 DIAGNOSIS — F41.9 ANXIETY: ICD-10-CM

## 2024-05-21 DIAGNOSIS — F70 MILD INTELLECTUAL DISABILITY: ICD-10-CM

## 2024-05-21 PROCEDURE — 3008F BODY MASS INDEX DOCD: CPT | Performed by: COUNSELOR

## 2024-05-21 PROCEDURE — 90832 PSYTX W PT 30 MINUTES: CPT | Performed by: COUNSELOR

## 2024-05-21 NOTE — PROGRESS NOTES
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 When asked if she talks to her staff there, she said sometimes. Spent time problem solving, like could she start eating 10 min earlier or at a smaller table, or people from her house bc she knows them  - reports a housemate has been sad bc she is missing her mom, reports that helping her feel better helps her mood (so we talked about ways to support her friend without taking on too much or feeling even more overwhelmed)    focused on:  - active listening  - problem solving  - coping skills: focusing on music, guided mediation, grounding techniques, mindfulness, affirmations

## 2024-05-31 NOTE — PROGRESS NOTES
Patient: Anisha Quevedo    [id number]  : 1995 -- AGE 28 y.o.    Provider: Jackie LEUNG [facility]     Location [facility]   Service Date: 23         Department of Medicine  Division of Pulmonary, Critical Care, and Sleep Medicine         [facility]  Follow Up Visit Note    HISTORY OF PRESENT ILLNESS     HISTORY OF PRESENT ILLNESS   Anisha Quevedo is a 28 y.o. female who has a history of cerebral palsy.  She is a never smokers, who presents to a [facility] for a follow up evaluation for asthma.    DATE OF LAST VISIT: 2023    Current History    Since last visit  OLDCART  cough  wheeze  Fevers/chills  FISCHER  SOB at rest  chest pain  allergies  GERD  ED visits  Up to date on vaccines  Night time  Day time    23: On today's visit, the patient is accompanied by a caregiver.  I called and spoke with her nurse at the facility she lives in to discuss the course of the last few months. She had a URI on 2023, PCP heard rhonchi on exam, she took a steroid burst  and Z-Waylon for 5 days. She reports feeling the same from a respiratory standpoint.  Occasional wheezing and cough. States she feels like her mucus is stuck in chest.  Denies shortness of breath at rest she did have a hospital visit a few months ago for the flu.  Today she is reporting sharp chest pain that is constant.  She has a history of anxiety and is not sure if it is anxiety related. Her nurse reports she has had a full cardiac workup for chest pains over the past 2 years and has been cleared by cardiology.    23: Mrs. Quevedo who has cerebral palsy and wheel chair bound is here accompanied by her caregiver. She uses symbicort 2 puffs twice a day. Duonebs twice a day. She reports an occasional cough. She says she noticed yesterday that she felt a little congested and SOB, duonebs helped. Denies wheezing, fevers chills, chest pain and ER visits for breathing  issues.      Previous pulmonary history: She was previously told she has asthma. She currently is on no supplemental oxygen. She has never been to pulmonary rehab. Does not recall having AECOPD requiring antibiotics or prednisone.     Inhalers/nebulized medications: symbicort, Duo-nebs     Hospitalization History: She has not been hospitalized over the last year for breathing related problem.     Sleep history: Denies snoring, apnea, feeling tired during the day or taking naps during the day.        REVIEW OF SYSTEMS     REVIEW OF SYSTEMS  Review of Systems    Constitutional: No fever, no chills, no night sweats.    Eyes: No double vision, no floaters, no dry eyes.   ENT: See HPI.   Neck: No neck stiffness.  Cardiovascular: No sharp chest pain, no heart racing, no leg swelling.  Respiratory: as noted in HPI.   Integumentary: No rashes or sores.  Neurological: No dizziness, no headaches. Sleeping well.  Psychiatric: No mood changes.     ALLERGIES AND MEDICATIONS     ALLERGIES  No Known Allergies    MEDICATIONS  Current Outpatient Medications   Medication Sig Dispense Refill    acetaminophen (Tylenol) 325 mg tablet Take 1-2 tablets (325-650 mg) by mouth every 4 hours if needed for fever (temp greater than 38.0 C) (or pain). No more than 3000 mg per day      albuterol 90 mcg/actuation inhaler Inhale 2 puffs 4 times a day as needed.      alum-mag hydroxide-simeth (Maalox MAX) 400-400-40 mg/5 mL suspension Take 15 mL by mouth once daily as needed.      ammonium lactate (Amlactin) 12 % cream Apply 1 Application topically once daily. Apply to right heal      budesonide (Rhinocort AQ) 32 mcg/actuation nasal spray Administer 2 sprays into each nostril once daily. 8.6 g 11    budesonide-formoteroL (Symbicort) 80-4.5 mcg/actuation inhaler Inhale 2 puffs 2 times a day.      cholecalciferol (Vitamin D3) 50 MCG (2000 UT) tablet Take 1 tablet (50 mcg) by mouth once daily. 30 tablet 11    ciclopirox (Loprox) 0.77 % gel Apply 1  Application topically once daily. Apply to right great toenail      famotidine (Pepcid) 20 mg tablet Take 1 tablet (20 mg) by mouth once daily. Take on an empty stomach 90 tablet 0    ferrous sulfate 325 (65 Fe) MG tablet Take 1 tablet by mouth once daily.      hydrOXYzine HCL (Atarax) 50 mg tablet Take 1 tablet (50 mg) by mouth once daily at bedtime. 30 tablet 2    inhaler,assist device,lg mask (AEROCHAMBER PLUS FLOW-VU,L MSK MISC) Use as directed with inhaler      ipratropium-albuteroL (Duo-Neb) 0.5-2.5 mg/3 mL nebulizer solution Take 3 mL by nebulization 4 times a day. For 2 weeks 180 mL 0    melatonin 5 mg capsule Take 1 capsule (5 mg) by mouth once daily at bedtime. 30 capsule 2    metoprolol tartrate (Lopressor) 25 mg tablet Take 0.5 tablets (12.5 mg) by mouth 2 times a day.      montelukast (Singulair) 10 mg tablet Take 1 tablet (10 mg) by mouth once daily at bedtime.      multivitamin tablet Take 1 tablet by mouth every other day.      mupirocin (Bactroban) 2 % ointment Apply a pea-size amount inside both nostrils once a day      naproxen (Naprosyn) 125 mg/5 mL suspension Take 15 mL (375 mg) by mouth 2 times a day as needed (for pain).      omeprazole (PriLOSEC) 20 mg DR capsule Take 1 capsule (20 mg) by mouth once daily in the morning. Take before meals.      omeprazole OTC (PriLOSEC OTC) 20 mg EC tablet Take 1 tablet (20 mg) by mouth once daily. Take on an empty stomach 90 tablet 0    Refresh Plus 0.5 % ophthalmic solution Administer 1 drop into both eyes 2 times a day.      risperiDONE (RisperDAL) 0.5 mg tablet Take 1 tablet (0.5 mg) by mouth twice daily - morning and 2pm. 60 tablet 2    risperiDONE (RisperDAL) 1 mg tablet Take 0.5 tablets (0.5 mg) by mouth once daily at bedtime. 15 tablet 2    risperiDONE (RisperDAL) 3 mg tablet Take 1 tablet (3 mg) by mouth once daily at bedtime. 30 tablet 2    sodium chloride (Nasal Moisturizing) 0.65 % nasal spray Administer 1 spray into each nostril 2 times a day as  needed (for dryness).       No current facility-administered medications for this visit.         PAST HISTORY     PAST MEDICAL HISTORY  Asthma  Anxiety  GERD     PAST SURGICAL HISTORY  Past Surgical History:   Procedure Laterality Date    OTHER SURGICAL HISTORY  03/03/2021    Hip surgery    OTHER SURGICAL HISTORY  08/22/2019    Percutaneous endoscopic gastrostomy tube insertion    OTHER SURGICAL HISTORY  02/19/2020    Heart surgery    SPINAL FIXATION SURGERY  07/25/2014    Spinal Arthrodesis For Deformity By Posterior Approach       IMMUNIZATION HISTORY  Immunization History   Administered Date(s) Administered    Flu vaccine (IIV4), preservative free *Check age/dose* 10/07/2020, 10/04/2021, 09/09/2022, 10/11/2022, 11/01/2023    Hepatitis A vaccine, pediatric/adolescent (HAVRIX, VAQTA) 06/19/2013, 09/03/2014    Influenza, seasonal, injectable 12/08/2006, 12/05/2008    Influenza, seasonal, injectable, preservative free 12/23/2009    Meningococcal ACWY vaccine (MENVEO) 09/03/2014    Meningococcal ACWY-D (Menactra) 4-valent conjugate vaccine 12/05/2008    Novel influenza-H1N1-09, preservative-free 12/23/2009    Pfizer COVID-19 vaccine, Fall 2023, 12 years and older, (30mcg/0.3mL) 11/01/2023    Pfizer COVID-19 vaccine, bivalent, age 12 years and older (30 mcg/0.3 mL) 10/11/2022    Pfizer Purple Cap SARS-CoV-2 01/14/2021, 02/04/2021, 11/05/2021    Pneumococcal polysaccharide vaccine, 23-valent, age 2 years and older (PNEUMOVAX 23) 10/13/2020    RSV-MAb 11/01/2023    Tdap vaccine, age 7 year and older (BOOSTRIX, ADACEL) 12/05/2008, 10/13/2020    Varicella vaccine, subcutaneous (VARIVAX) 12/05/2008       SOCIAL HISTORY  Tobacco Smoking: never  Illicit drugs: none  Alcohol consumption: none  Pets: none    OCCUPATIONAL/ENVIRONMENTAL HISTORY  Occupation: Disability.  No have known exposure to asbestos, silica, beryllium or inhaled metals.    FAMILY HISTORY  Family History   Problem Relation Name Age of Onset    Hypertension  Sister Manuela     Leukemia Sister Manuela     Schizophrenia Sister Manuela      No have a family history of pulmonary disease.  No have a family history of cancer.    PHYSICAL EXAM     VITAL SIGNS:   There were no vitals filed for this visit.        PREVIOUS WEIGHTS: There is no height or weight on file to calculate BMI.    Wt Readings from Last 3 Encounters:   05/03/24 77.6 kg (171 lb)   01/05/24 77.1 kg (170 lb)   11/21/23 75.3 kg (166 lb)       Physical Exam    Constitutional: General appearance: Alert and oriented.  No acute distress. Well developed, well nourished.  Head and face: Symmetric  Pulmonary: Chest is normal. No increased work of breathing or signs of respiratory distress. Clear to auscultation bilaterally - no crackles, wheezing, or rhonchi.   Cardiovascular: Heart rate and rhythm normal. Normal S1, S2 - no murmurs, gallops, or pericardial rub.   Abdomen: Soft, non tender, +BS  Extremities: No edema. No clubbing or cyanosis of the fingernails.    Neurologic: Moves all four extremities   MSK: Normal movements of extremities. Gait normal   Psychiatric: Intact judgement and insight.    RESULTS/DATA     Pulmonary Function Test Results     6/23/21: FEV1/FVC 0.59 /FEV1 0.87 (36% + BD response)/ FVC 1.48 (54%)/ DLCO 68%     Chest Radiograph     XR CHEST 1 VIEW 04/26/2022    Narrative  MRN: [id number]  Patient Name: JB BURRELL    STUDY:  CHEST 1 VIEW;  4/26/2022 3:14 pm    INDICATION:  cough, hx aspiration .    COMPARISON:  03/29/2021    ACCESSION NUMBER(S):  [id number]    ORDERING CLINICIAN:  BELL RIDLEY    FINDINGS:  A single AP portable radiograph of the chest was obtained.  Multiple  cardiac monitoring leads are seen over the chest.   Bilateral spinal  fusion rods are present. No focal infiltrate, pleural effusion or  pneumothorax is identified. The cardiac silhouette  is within normal  limits for size.    Impression  No focal infiltrate or pneumothorax is identified.      Chest CT Scan     CT CHEST PULMONARY  EMBOLISM W IV CONTRAST - 6/25/19  Status: Final result     PACS Images     Show images for CT CHEST PULMONARY EMBOLISM W IV CONTRAST  Signed by    Signed Time Phone Pager   Lidya Preston MD 6/25/2019 15:25 [phone]     Exam Information    Status Exam Begun Exam Ended   Final 6/24/2019 15:30 6/24/2019 15:39     Study Result    Narrative & Impression   MRN: [id number]  Patient Name: JB BURRELL     STUDY:   CT ANGIO CHEST FOR PE; 6/24/2019 3:39 pm     INDICATION:  tachycardia, SOB, Lie Flat: Yes.     COMPARISON:  Chest x-ray 06/20/2019     ACCESSION NUMBER(S):  [id number]     ORDERING CLINICIAN:  VIANEY LAKE     TECHNIQUE:  Helical data acquisition of the chest was obtained with 60 cc of  Isovue 370 as intravenous contrast. Images were reformatted in  coronal and sagittal planes. Axial and coronal MIP images were  created and reviewed.     FINDINGS:  POTENTIAL LIMITATIONS OF THE STUDY:Respiratory motion and beam  hardening artifact from adjacent orthopedic hardware.     HEART AND VESSELS:  Given limitations there are no discrete filling defects within the  main pulmonary artery, segmental branches.     Main pulmonary artery and its branches are normal in caliber.     The thoracic aorta is of normal course and caliber without vascular  calcifications.     No coronary artery calcifications are seen.The study is not optimized  for evaluation of coronary arteries.     The cardiac chambers are not enlarged.     No evidence of pericardial effusion.     MEDIASTINUM AND GEOVANI, LOWER NECK AND AXILLA:  The visualized thyroid gland is within normal limits.     No evidence of thoracic lymphadenopathy by CT criteria.     Esophagus appears within normal limits as seen.     LUNGS AND AIRWAYS:  The trachea is patent. There is mucous/secretions in the bronchus  supplying the left lower lobe, best seen on lung window images 129  through 148 of 259. There is no distal segmental or subsegmental  collapse.     There  are no focal consolidations, pulmonary nodules/masses or  pleural effusions. There is no pneumothorax.     UPPER ABDOMEN:  The visualized subdiaphragmatic structures demonstrate no remarkable  findings.     CHEST WALL AND OSSEOUS STRUCTURES:  There is redemonstration of multilevel posterior lumbar fusion with  inter-pedicular screws as well as cerclage wires. The chest wall soft  tissues are within normal limits.     IMPRESSION:  1. No evidence of acute pulmonary embolism of the central or  segmental branches. Evaluation of the subsegmental vasculature is  limited due to respiratory motion and beam hardening artifact from  adjacent orthopedic hardware.  2. Mucus/secretions identified within the bronchus supplying the left  lower lobe. There is no evidence of distal segmental or subsegmental  collapse. Correlation with concern for aspiration and correlation  with airway hygiene.  3. Redemonstration of multilevel posterior thoracolumbar fusion with  intrapedicular screws and cerclage wires.         Echocardiogram     [facility], [address] [place] [address]              Tel [phone] and Fax [phone]     TRANSTHORACIC ECHOCARDIOGRAM REPORT        Patient Name:     JB Rivera           [id number] Saad Rao                                 Physician:        MD  Study Date:       5/3/2021     Referring         CASSIDY RAMIREZ                                 Physician:  MRN/PID:          [id number]     PCP:  Accession/Order#: [id number] Department        [facility]                                 Location:  YOB: 1995    Fellow:  Gender:           F            Nurse:  Admit Date:                    Sonographer:      Mayelin Garcia [facility]  Admission Status: Outpatient   Additional Staff:  Height:           149.86 cm    CC Report to:  Weight:           64.86 kg     Study Type:       Echocardiogram  BSA:              1.60 m2  Blood Pressure: 99 /69 mmHg      Diagnosis/ICD: R07.9-Chest pain, unspecified  Indication:    Chest Pain  Procedure/CPT: Echo Complete w Full Doppler-[id number]     Patient History:  Pertinent History: Chest Pain and Dyspnea. COVID-19 recovered (12/16/2020),                     tachycardia, schizophrenia, cerebral palsy-quadriplegic.     Study Detail: The following Echo studies were performed: 2D, M-Mode, Doppler and                color flow. Technically challenging study due to patient sitting                in wheel chair.        PHYSICIAN INTERPRETATION:  Left Ventricle: The left ventricular systolic function is normal, with an estimated ejection fraction of 65-70%. There are no regional wall motion abnormalities. The left ventricular cavity size is normal. There is left ventricular concentric remodeling. Spectral Doppler shows a normal pattern of left ventricular diastolic filling.  Left Atrium: The left atrium is normal in size.  Right Ventricle: The right ventricle is normal in size. There is normal right ventricular global systolic function.  Right Atrium: The right atrium is normal in size.  Aortic Valve: The aortic valve is trileaflet. There is no evidence of aortic valve regurgitation. The peak instantaneous gradient of the aortic valve is 4.5 mmHg. The mean gradient of the aortic valve is 2.7 mmHg.  Mitral Valve: The mitral valve is normal in structure. There is no evidence of mitral valve regurgitation.  Tricuspid Valve: The tricuspid valve is structurally normal. There is trace tricuspid regurgitation.  Pulmonic Valve: The pulmonic valve is structurally normal. There is physiologic pulmonic valve regurgitation.  Pericardium: There is no pericardial effusion noted.  Aorta: The aortic root is normal.  Pulmonary Artery: The tricuspid regurgitant velocity is 2.38 m/s, and with an estimated right atrial pressure of 3 mmHg, the estimated pulmonary artery pressure is normal with the RVSP at 25.6 mmHg.  Systemic Veins: The inferior vena cava  appears to be of normal size. There is IVC inspiratory collapse greater than 50%.        CONCLUSIONS:   1. The left ventricular systolic function is normal with a 65-70% estimated ejection fraction.     QUANTITATIVE DATA SUMMARY:  2D MEASUREMENTS:                           Normal Ranges:  Ao Root d:     2.20 cm   (2.0-3.7cm)  LAs:           1.57 cm   (2.7-4.0cm)  IVSd:          1.03 cm   (0.6-1.1cm)  LVPWd:         1.18 cm   (0.6-1.1cm)  LVIDd:         2.59 cm   (3.9-5.9cm)  LVIDs:         1.90 cm  LV Mass Index: 49.2 g/m2  LV % FS        26.6 %     LA VOLUME:                                Normal Ranges:  LA Vol A4C:        32.1 ml    (22+/-6mL/m2)  LA Vol A2C:        26.7 ml  LA Vol BP:         29.3 ml  LA Vol Index A4C:  20.1 ml/m2  LA Vol Index A2C:  16.7 ml/m2  LA Vol Index BP:   18.3 ml/m2  LA Area A4C:       12.3 cm2  LA Area A2C:       11.2 cm2  LA Major Axis A4C: 4.0 cm  LA Major Axis A2C: 4.0 cm  LA Volume Index:   18.3 ml/m2  LA Vol A4C:        29.4 ml  LA Vol A2C:        25.9 ml     AORTA MEASUREMENTS:                     Normal Ranges:  Asc Ao, d: 2.30 cm (2.1-3.4cm)     LV DIASTOLIC FUNCTION:                         Normal Ranges:  MV Peak E:    0.77 m/s (0.7-1.2 m/s)  MV Peak A:    0.77 m/s (0.42-0.7 m/s)  E/A Ratio:    0.99     (1.0-2.2)  MV e'         0.08 m/s (>8.0)  MV lateral e' 0.09 m/s  MV medial e'  0.08 m/s  E/e' Ratio:   9.02     (<8.0)     AORTIC VALVE:                                    Normal Ranges:  AoV Vmax:                1.07 m/s (<1.7m/s)  AoV Peak P.5 mmHg (<20mmHg)  AoV Mean P.7 mmHg (1.7-11.5mmHg)  LVOT Max Vega:            0.79 m/s (<1.1m/s)  AoV VTI:                 16.86 cm (18-25cm)  LVOT VTI:                12.63 cm  LVOT Diameter:           1.60 cm  (1.8-2.4cm)  AoV Area, VTI:           1.52 cm2 (2.5-5.5cm2)  AoV Area,Vmax:           1.50 cm2 (2.5-4.5cm2)  AoV Dimensionless Index: 0.75     RIGHT VENTRICLE:  TAPSE: 19.0 mm  RV s'  0.15 m/s    "  TRICUSPID VALVE/RVSP:                              Normal Ranges:  Peak TR Velocity: 2.38 m/s  RV Syst Pressure: 25.6 mmHg (< 30mmHg)  IVC Diam:         1.40 cm     PULMONIC VALVE:                       Normal Ranges:  PV Max Vega: 0.8 m/s  (0.6-0.9m/s)  PV Max P.3 mmHg        [id number] Saad Rao MD  Electronically signed on 5/3/2021 at 2:44:25 PM          Labwork   Complete Blood Count  Lab Results   Component Value Date    WBC 7.4 2023    HGB 14.3 2023    HCT 43.6 2023    MCV 92 2023     2023       Peripheral Eosinophil Count/Percentage:   Eosinophils Absolute (x10E9/L)   Date Value   2022 0.05     Eosinophils % (%)   Date Value   2022 0.7       Serum Immunoglobulin E:    No results found for: \"IGE\"       ASSESSMENT/PLAN     Ms. Quevedo is a 28 y.o. female, who has a history of cerebral palsy.  She is a never smoker, who presents to a [facility] for a follow up evaluation for asthma.      Problem List and Orders  Problem List Items Addressed This Visit    None        Assessment and Plan / Recommendations:  Problem List Items Addressed This Visit    None     Asthma; moderate obstruction on most recent PFTs  - Continue Symbicort 2 puffs twice a day  - Continue albuterol HFA ipratropium/albuterol nebulizers twice a day  - Continue montelukast 10 mg at bedtime   - Acapella -ok to use PRN after breathing treatments for mucus relief   - Mucinex PRN for every 12 hours for mucus relief       - Will get CXR today - recent URI               Chest pain       - Will get EKG today for chest pain    Follow up in 6 months or sooner if needed.    If you have any questions please call the office [phone]    Thank you for visiting the Pulmonary clinic today!   Jackie Ortiz CNP  [phone]   "

## 2024-06-03 ENCOUNTER — TELEPHONE (OUTPATIENT)
Dept: PRIMARY CARE | Facility: CLINIC | Age: 29
End: 2024-06-03
Payer: COMMERCIAL

## 2024-06-03 ENCOUNTER — PATIENT OUTREACH (OUTPATIENT)
Dept: CARE COORDINATION | Facility: CLINIC | Age: 29
End: 2024-06-03
Payer: COMMERCIAL

## 2024-06-03 RX ORDER — PREDNISONE 20 MG/1
40 TABLET ORAL DAILY
Status: ON HOLD | COMMUNITY
Start: 2024-05-29 | End: 2024-06-03

## 2024-06-03 RX ORDER — ALBUTEROL SULFATE 0.83 MG/ML
2.5 SOLUTION RESPIRATORY (INHALATION) 3 TIMES DAILY
Status: ON HOLD | COMMUNITY
Start: 2024-05-29

## 2024-06-03 NOTE — PROGRESS NOTES
Discharge Facility: [facility]  Discharge Diagnosis: possible aspiration, bronchitis, asthma  Admission Date: 5/25/24  Discharge Date: 5/31/24    PCP Appointment Date: TBD  Specialist Appointment Date: 6/6/24 Pulmonary  TBD Gi possibly due to having EGD in hospital  Hospital Encounter and Summary: Limited info found in epic  See discharge assessment below for further details    Engagement  Call Start Time: 1145 (6/3/2024 11:55 AM)    Medications  Medications reviewed with patient/caregiver?: Yes (6/3/2024 11:55 AM)  Is the patient having any side effects they believe may be caused by any medication additions or changes?: No (6/3/2024 11:55 AM)  Does the patient have all medications ordered at discharge?: Yes (6/3/2024 11:55 AM)  Care Management Interventions: No intervention needed (6/3/2024 11:55 AM)  Prescription Comments: Spoke with Anisha Sosa's caregiver. Has albuterol breathing treatments, prednisone, and flonase. Difficult to review med list as [facility] med list isnt fully available. (6/3/2024 11:55 AM)  Is the patient taking all medications as directed (includes completed medication regime)?: Yes (6/3/2024 11:55 AM)  Care Management Interventions: Provided patient education (6/3/2024 11:55 AM)  Medication Comments: No issues with medications, gets them supplied to [facility] home through a pharmacy service (6/3/2024 11:55 AM)    Appointments  Does the patient have a primary care provider?: Yes (6/3/2024 11:55 AM)  Care Management Interventions: Advised patient to make appointment (Office to call Sheila to set up appt, no appts avail within 14 days to schedule hosp fu) (6/3/2024 11:55 AM)  Has the patient kept scheduled appointments due by today?: Yes (6/3/2024 11:55 AM)  Care Management Interventions: Advised patient to keep appointment (Has pulm appt 6/6/24) (6/3/2024 11:55 AM)    Self Management  What is the home health agency?: N/A (6/3/2024 11:55 AM)  What Durable Medical Equipment  (DME) was ordered?: N/A (6/3/2024 11:55 AM)    Patient Teaching  Does the patient have access to their discharge instructions?: Yes (6/3/2024 11:55 AM)  Care Management Interventions: Reviewed instructions with patient (6/3/2024 11:55 AM)  What is the patient's perception of their health status since discharge?: Improving (6/3/2024 11:55 AM)  Is the patient/caregiver able to teach back the hierarchy of who to call/visit for symptoms/problems? PCP, Specialist, Home Health nurse, Urgent Care, ED, 911: Yes (6/3/2024 11:55 AM)  Patient/Caregiver Education Comments: Spoke with caregiver Sheila who states she has not heard of any issues at the group home so far in regards to Anisha's recovery (6/3/2024 11:55 AM)    Wrap Up  Wrap Up Additional Comments: Aware to seek care at hospital for any worsening shortness of breath. Apparently had modified barium swallow at the hospital but results are not in epic, office may need to request these. Diet may have been altered to reflect these findings. (6/3/2024 11:55 AM)  Call End Time: 1152 (6/3/2024 11:55 AM)

## 2024-06-03 NOTE — TELEPHONE ENCOUNTER
Pt was in the hospital at  last week and had a barium swallow done and Issac from [facility] has not been able to get the results from that test. She advised there has been a dietary change due to the results. Please call and advise if we can get the results and advise

## 2024-06-04 NOTE — TELEPHONE ENCOUNTER
Called and spoke with junie Davenport needs request form sent to release records   Provided fax #

## 2024-06-04 NOTE — TELEPHONE ENCOUNTER
6/4/24 called left message to call back to set up hospital fu.    Looking to schedule before 6/13/24

## 2024-06-06 ENCOUNTER — APPOINTMENT (OUTPATIENT)
Dept: PULMONOLOGY | Facility: CLINIC | Age: 29
End: 2024-06-06
Payer: MEDICARE

## 2024-06-07 ENCOUNTER — APPOINTMENT (OUTPATIENT)
Dept: CARDIOLOGY | Facility: HOSPITAL | Age: 29
DRG: 202 | End: 2024-06-07
Payer: MEDICARE

## 2024-06-07 ENCOUNTER — APPOINTMENT (OUTPATIENT)
Dept: RADIOLOGY | Facility: HOSPITAL | Age: 29
DRG: 202 | End: 2024-06-07
Payer: MEDICARE

## 2024-06-07 ENCOUNTER — HOSPITAL ENCOUNTER (INPATIENT)
Facility: HOSPITAL | Age: 29
DRG: 202 | End: 2024-06-07
Attending: STUDENT IN AN ORGANIZED HEALTH CARE EDUCATION/TRAINING PROGRAM | Admitting: INTERNAL MEDICINE
Payer: MEDICARE

## 2024-06-07 DIAGNOSIS — J45.909 MODERATE ASTHMA, UNSPECIFIED WHETHER COMPLICATED, UNSPECIFIED WHETHER PERSISTENT (HHS-HCC): ICD-10-CM

## 2024-06-07 DIAGNOSIS — R06.02 SHORTNESS OF BREATH: Primary | ICD-10-CM

## 2024-06-07 DIAGNOSIS — J30.1 SEASONAL ALLERGIC RHINITIS DUE TO POLLEN: ICD-10-CM

## 2024-06-07 DIAGNOSIS — K59.09 OTHER CONSTIPATION: ICD-10-CM

## 2024-06-07 LAB
ALBUMIN SERPL BCP-MCNC: 4 G/DL (ref 3.4–5)
ALP SERPL-CCNC: 63 U/L (ref 33–110)
ALT SERPL W P-5'-P-CCNC: 14 U/L (ref 7–45)
ANION GAP SERPL CALC-SCNC: 14 MMOL/L (ref 10–20)
AST SERPL W P-5'-P-CCNC: 11 U/L (ref 9–39)
B-HCG SERPL-ACNC: <2 MIU/ML
BASOPHILS # BLD AUTO: 0.04 X10*3/UL (ref 0–0.1)
BASOPHILS NFR BLD AUTO: 0.5 %
BILIRUB SERPL-MCNC: 0.3 MG/DL (ref 0–1.2)
BNP SERPL-MCNC: 6 PG/ML (ref 0–99)
BUN SERPL-MCNC: 9 MG/DL (ref 6–23)
CALCIUM SERPL-MCNC: 9.2 MG/DL (ref 8.6–10.3)
CARDIAC TROPONIN I PNL SERPL HS: 4 NG/L (ref 0–13)
CHLORIDE SERPL-SCNC: 103 MMOL/L (ref 98–107)
CO2 SERPL-SCNC: 25 MMOL/L (ref 21–32)
CREAT SERPL-MCNC: 0.58 MG/DL (ref 0.5–1.05)
D DIMER PPP FEU-MCNC: 579 NG/ML FEU
EGFRCR SERPLBLD CKD-EPI 2021: >90 ML/MIN/1.73M*2
EOSINOPHIL # BLD AUTO: 0.08 X10*3/UL (ref 0–0.7)
EOSINOPHIL NFR BLD AUTO: 1.1 %
ERYTHROCYTE [DISTWIDTH] IN BLOOD BY AUTOMATED COUNT: 13.6 % (ref 11.5–14.5)
GLUCOSE SERPL-MCNC: 100 MG/DL (ref 74–99)
HCT VFR BLD AUTO: 43.5 % (ref 36–46)
HGB BLD-MCNC: 14.5 G/DL (ref 12–16)
IMM GRANULOCYTES # BLD AUTO: 0.04 X10*3/UL (ref 0–0.7)
IMM GRANULOCYTES NFR BLD AUTO: 0.5 % (ref 0–0.9)
LACTATE SERPL-SCNC: 1.8 MMOL/L (ref 0.4–2)
LYMPHOCYTES # BLD AUTO: 1.7 X10*3/UL (ref 1.2–4.8)
LYMPHOCYTES NFR BLD AUTO: 22.9 %
MCH RBC QN AUTO: 30.5 PG (ref 26–34)
MCHC RBC AUTO-ENTMCNC: 33.3 G/DL (ref 32–36)
MCV RBC AUTO: 91 FL (ref 80–100)
MONOCYTES # BLD AUTO: 0.87 X10*3/UL (ref 0.1–1)
MONOCYTES NFR BLD AUTO: 11.7 %
NEUTROPHILS # BLD AUTO: 4.68 X10*3/UL (ref 1.2–7.7)
NEUTROPHILS NFR BLD AUTO: 63.3 %
NRBC BLD-RTO: 0 /100 WBCS (ref 0–0)
PLATELET # BLD AUTO: 285 X10*3/UL (ref 150–450)
POTASSIUM SERPL-SCNC: 3.6 MMOL/L (ref 3.5–5.3)
PROT SERPL-MCNC: 7.5 G/DL (ref 6.4–8.2)
RBC # BLD AUTO: 4.76 X10*6/UL (ref 4–5.2)
SODIUM SERPL-SCNC: 138 MMOL/L (ref 136–145)
TSH SERPL-ACNC: 2.1 MIU/L (ref 0.44–3.98)
WBC # BLD AUTO: 7.4 X10*3/UL (ref 4.4–11.3)

## 2024-06-07 PROCEDURE — 85379 FIBRIN DEGRADATION QUANT: CPT | Performed by: STUDENT IN AN ORGANIZED HEALTH CARE EDUCATION/TRAINING PROGRAM

## 2024-06-07 PROCEDURE — 71045 X-RAY EXAM CHEST 1 VIEW: CPT | Mod: FOREIGN READ | Performed by: RADIOLOGY

## 2024-06-07 PROCEDURE — 93005 ELECTROCARDIOGRAM TRACING: CPT

## 2024-06-07 PROCEDURE — 36415 COLL VENOUS BLD VENIPUNCTURE: CPT | Performed by: STUDENT IN AN ORGANIZED HEALTH CARE EDUCATION/TRAINING PROGRAM

## 2024-06-07 PROCEDURE — 80053 COMPREHEN METABOLIC PANEL: CPT | Performed by: STUDENT IN AN ORGANIZED HEALTH CARE EDUCATION/TRAINING PROGRAM

## 2024-06-07 PROCEDURE — 85025 COMPLETE CBC W/AUTO DIFF WBC: CPT | Performed by: STUDENT IN AN ORGANIZED HEALTH CARE EDUCATION/TRAINING PROGRAM

## 2024-06-07 PROCEDURE — 71045 X-RAY EXAM CHEST 1 VIEW: CPT

## 2024-06-07 PROCEDURE — 2500000002 HC RX 250 W HCPCS SELF ADMINISTERED DRUGS (ALT 637 FOR MEDICARE OP, ALT 636 FOR OP/ED): Performed by: STUDENT IN AN ORGANIZED HEALTH CARE EDUCATION/TRAINING PROGRAM

## 2024-06-07 PROCEDURE — 94640 AIRWAY INHALATION TREATMENT: CPT

## 2024-06-07 PROCEDURE — 99285 EMERGENCY DEPT VISIT HI MDM: CPT

## 2024-06-07 PROCEDURE — 84484 ASSAY OF TROPONIN QUANT: CPT | Performed by: STUDENT IN AN ORGANIZED HEALTH CARE EDUCATION/TRAINING PROGRAM

## 2024-06-07 PROCEDURE — 83605 ASSAY OF LACTIC ACID: CPT | Performed by: STUDENT IN AN ORGANIZED HEALTH CARE EDUCATION/TRAINING PROGRAM

## 2024-06-07 PROCEDURE — 87040 BLOOD CULTURE FOR BACTERIA: CPT | Mod: PARLAB | Performed by: STUDENT IN AN ORGANIZED HEALTH CARE EDUCATION/TRAINING PROGRAM

## 2024-06-07 PROCEDURE — 71275 CT ANGIOGRAPHY CHEST: CPT | Mod: FOREIGN READ | Performed by: RADIOLOGY

## 2024-06-07 PROCEDURE — 71275 CT ANGIOGRAPHY CHEST: CPT

## 2024-06-07 PROCEDURE — 84702 CHORIONIC GONADOTROPIN TEST: CPT | Performed by: STUDENT IN AN ORGANIZED HEALTH CARE EDUCATION/TRAINING PROGRAM

## 2024-06-07 PROCEDURE — 84443 ASSAY THYROID STIM HORMONE: CPT | Performed by: STUDENT IN AN ORGANIZED HEALTH CARE EDUCATION/TRAINING PROGRAM

## 2024-06-07 PROCEDURE — 2550000001 HC RX 255 CONTRASTS: Performed by: STUDENT IN AN ORGANIZED HEALTH CARE EDUCATION/TRAINING PROGRAM

## 2024-06-07 PROCEDURE — 83880 ASSAY OF NATRIURETIC PEPTIDE: CPT | Performed by: STUDENT IN AN ORGANIZED HEALTH CARE EDUCATION/TRAINING PROGRAM

## 2024-06-07 RX ORDER — IPRATROPIUM BROMIDE AND ALBUTEROL SULFATE 2.5; .5 MG/3ML; MG/3ML
3 SOLUTION RESPIRATORY (INHALATION) ONCE
Status: COMPLETED | OUTPATIENT
Start: 2024-06-07 | End: 2024-06-07

## 2024-06-07 RX ORDER — IPRATROPIUM BROMIDE AND ALBUTEROL SULFATE 2.5; .5 MG/3ML; MG/3ML
3 SOLUTION RESPIRATORY (INHALATION) EVERY 20 MIN
Status: DISCONTINUED | OUTPATIENT
Start: 2024-06-08 | End: 2024-06-08

## 2024-06-07 RX ADMIN — IOHEXOL 90 ML: 350 INJECTION, SOLUTION INTRAVENOUS at 23:53

## 2024-06-07 RX ADMIN — IPRATROPIUM BROMIDE AND ALBUTEROL SULFATE 3 ML: .5; 3 SOLUTION RESPIRATORY (INHALATION) at 22:07

## 2024-06-08 ENCOUNTER — APPOINTMENT (OUTPATIENT)
Dept: RADIOLOGY | Facility: HOSPITAL | Age: 29
DRG: 202 | End: 2024-06-08
Payer: MEDICARE

## 2024-06-08 PROBLEM — R06.02 SHORTNESS OF BREATH: Status: ACTIVE | Noted: 2024-06-08

## 2024-06-08 PROCEDURE — 2500000004 HC RX 250 GENERAL PHARMACY W/ HCPCS (ALT 636 FOR OP/ED): Performed by: STUDENT IN AN ORGANIZED HEALTH CARE EDUCATION/TRAINING PROGRAM

## 2024-06-08 PROCEDURE — 1200000002 HC GENERAL ROOM WITH TELEMETRY DAILY

## 2024-06-08 PROCEDURE — 70490 CT SOFT TISSUE NECK W/O DYE: CPT | Performed by: RADIOLOGY

## 2024-06-08 PROCEDURE — 2500000002 HC RX 250 W HCPCS SELF ADMINISTERED DRUGS (ALT 637 FOR MEDICARE OP, ALT 636 FOR OP/ED): Mod: MUE | Performed by: INTERNAL MEDICINE

## 2024-06-08 PROCEDURE — 70490 CT SOFT TISSUE NECK W/O DYE: CPT

## 2024-06-08 PROCEDURE — 96361 HYDRATE IV INFUSION ADD-ON: CPT

## 2024-06-08 PROCEDURE — 94640 AIRWAY INHALATION TREATMENT: CPT | Mod: MUE

## 2024-06-08 PROCEDURE — 96375 TX/PRO/DX INJ NEW DRUG ADDON: CPT

## 2024-06-08 PROCEDURE — 96365 THER/PROPH/DIAG IV INF INIT: CPT

## 2024-06-08 PROCEDURE — 2500000002 HC RX 250 W HCPCS SELF ADMINISTERED DRUGS (ALT 637 FOR MEDICARE OP, ALT 636 FOR OP/ED): Mod: MUE | Performed by: STUDENT IN AN ORGANIZED HEALTH CARE EDUCATION/TRAINING PROGRAM

## 2024-06-08 PROCEDURE — 2500000001 HC RX 250 WO HCPCS SELF ADMINISTERED DRUGS (ALT 637 FOR MEDICARE OP): Performed by: STUDENT IN AN ORGANIZED HEALTH CARE EDUCATION/TRAINING PROGRAM

## 2024-06-08 PROCEDURE — 2500000002 HC RX 250 W HCPCS SELF ADMINISTERED DRUGS (ALT 637 FOR MEDICARE OP, ALT 636 FOR OP/ED): Performed by: STUDENT IN AN ORGANIZED HEALTH CARE EDUCATION/TRAINING PROGRAM

## 2024-06-08 RX ORDER — FLUTICASONE PROPIONATE 50 MCG
2 SPRAY, SUSPENSION (ML) NASAL DAILY
Status: DISCONTINUED | OUTPATIENT
Start: 2024-06-08 | End: 2024-06-18 | Stop reason: HOSPADM

## 2024-06-08 RX ORDER — IPRATROPIUM BROMIDE AND ALBUTEROL SULFATE 2.5; .5 MG/3ML; MG/3ML
3 SOLUTION RESPIRATORY (INHALATION)
Status: DISCONTINUED | OUTPATIENT
Start: 2024-06-08 | End: 2024-06-10

## 2024-06-08 RX ORDER — METOPROLOL TARTRATE 25 MG/1
12.5 TABLET, FILM COATED ORAL 2 TIMES DAILY
Status: DISCONTINUED | OUTPATIENT
Start: 2024-06-08 | End: 2024-06-18 | Stop reason: HOSPADM

## 2024-06-08 RX ORDER — RISPERIDONE 3 MG/1
3 TABLET ORAL NIGHTLY
Status: DISCONTINUED | OUTPATIENT
Start: 2024-06-08 | End: 2024-06-08

## 2024-06-08 RX ORDER — POLYETHYLENE GLYCOL 3350 17 G/17G
17 POWDER, FOR SOLUTION ORAL DAILY PRN
Status: DISCONTINUED | OUTPATIENT
Start: 2024-06-08 | End: 2024-06-18 | Stop reason: HOSPADM

## 2024-06-08 RX ORDER — IPRATROPIUM BROMIDE AND ALBUTEROL SULFATE 2.5; .5 MG/3ML; MG/3ML
3 SOLUTION RESPIRATORY (INHALATION) EVERY 2 HOUR PRN
Status: DISCONTINUED | OUTPATIENT
Start: 2024-06-08 | End: 2024-06-12

## 2024-06-08 RX ORDER — SODIUM CHLORIDE, SODIUM LACTATE, POTASSIUM CHLORIDE, CALCIUM CHLORIDE 600; 310; 30; 20 MG/100ML; MG/100ML; MG/100ML; MG/100ML
75 INJECTION, SOLUTION INTRAVENOUS CONTINUOUS
Status: DISCONTINUED | OUTPATIENT
Start: 2024-06-08 | End: 2024-06-10

## 2024-06-08 RX ORDER — ACETAMINOPHEN 500 MG
5 TABLET ORAL NIGHTLY
Status: DISCONTINUED | OUTPATIENT
Start: 2024-06-08 | End: 2024-06-18 | Stop reason: HOSPADM

## 2024-06-08 RX ORDER — PANTOPRAZOLE SODIUM 40 MG/1
40 TABLET, DELAYED RELEASE ORAL
Status: DISCONTINUED | OUTPATIENT
Start: 2024-06-09 | End: 2024-06-18 | Stop reason: HOSPADM

## 2024-06-08 RX ORDER — IPRATROPIUM BROMIDE AND ALBUTEROL SULFATE 2.5; .5 MG/3ML; MG/3ML
3 SOLUTION RESPIRATORY (INHALATION)
Status: DISCONTINUED | OUTPATIENT
Start: 2024-06-08 | End: 2024-06-08

## 2024-06-08 RX ORDER — RISPERIDONE 1 MG/1
3.5 TABLET ORAL NIGHTLY
Status: DISCONTINUED | OUTPATIENT
Start: 2024-06-08 | End: 2024-06-18 | Stop reason: HOSPADM

## 2024-06-08 RX ORDER — RISPERIDONE 1 MG/1
0.5 TABLET ORAL 2 TIMES DAILY
Status: DISCONTINUED | OUTPATIENT
Start: 2024-06-09 | End: 2024-06-18 | Stop reason: HOSPADM

## 2024-06-08 RX ORDER — HYDROXYZINE HYDROCHLORIDE 25 MG/1
50 TABLET, FILM COATED ORAL NIGHTLY
Status: DISCONTINUED | OUTPATIENT
Start: 2024-06-08 | End: 2024-06-18 | Stop reason: HOSPADM

## 2024-06-08 RX ORDER — ENOXAPARIN SODIUM 100 MG/ML
40 INJECTION SUBCUTANEOUS EVERY 24 HOURS
Status: DISCONTINUED | OUTPATIENT
Start: 2024-06-08 | End: 2024-06-18 | Stop reason: HOSPADM

## 2024-06-08 RX ORDER — MONTELUKAST SODIUM 10 MG/1
10 TABLET ORAL NIGHTLY
Status: DISCONTINUED | OUTPATIENT
Start: 2024-06-08 | End: 2024-06-18 | Stop reason: HOSPADM

## 2024-06-08 RX ORDER — FLUTICASONE FUROATE AND VILANTEROL 100; 25 UG/1; UG/1
1 POWDER RESPIRATORY (INHALATION)
Status: DISCONTINUED | OUTPATIENT
Start: 2024-06-09 | End: 2024-06-18 | Stop reason: HOSPADM

## 2024-06-08 RX ADMIN — AMPICILLIN SODIUM AND SULBACTAM SODIUM 3 G: 2; 1 INJECTION, POWDER, FOR SOLUTION INTRAMUSCULAR; INTRAVENOUS at 23:58

## 2024-06-08 RX ADMIN — AMPICILLIN SODIUM AND SULBACTAM SODIUM 3 G: 2; 1 INJECTION, POWDER, FOR SOLUTION INTRAMUSCULAR; INTRAVENOUS at 19:10

## 2024-06-08 RX ADMIN — IPRATROPIUM BROMIDE AND ALBUTEROL SULFATE 3 ML: .5; 3 SOLUTION RESPIRATORY (INHALATION) at 23:56

## 2024-06-08 RX ADMIN — AMPICILLIN SODIUM AND SULBACTAM SODIUM 3 G: 2; 1 INJECTION, POWDER, FOR SOLUTION INTRAMUSCULAR; INTRAVENOUS at 13:31

## 2024-06-08 RX ADMIN — IPRATROPIUM BROMIDE AND ALBUTEROL SULFATE 3 ML: .5; 3 SOLUTION RESPIRATORY (INHALATION) at 00:18

## 2024-06-08 RX ADMIN — IPRATROPIUM BROMIDE AND ALBUTEROL SULFATE 3 ML: .5; 3 SOLUTION RESPIRATORY (INHALATION) at 14:37

## 2024-06-08 RX ADMIN — SODIUM CHLORIDE 3 G: 900 INJECTION INTRAVENOUS at 02:00

## 2024-06-08 RX ADMIN — HYDROXYZINE HYDROCHLORIDE 50 MG: 25 TABLET ORAL at 20:54

## 2024-06-08 RX ADMIN — SODIUM CHLORIDE, POTASSIUM CHLORIDE, SODIUM LACTATE AND CALCIUM CHLORIDE 75 ML/HR: 600; 310; 30; 20 INJECTION, SOLUTION INTRAVENOUS at 20:46

## 2024-06-08 RX ADMIN — SODIUM CHLORIDE 500 ML: 9 INJECTION, SOLUTION INTRAVENOUS at 02:00

## 2024-06-08 RX ADMIN — IPRATROPIUM BROMIDE AND ALBUTEROL SULFATE 3 ML: .5; 3 SOLUTION RESPIRATORY (INHALATION) at 19:04

## 2024-06-08 RX ADMIN — RISPERIDONE 3.5 MG: 1 TABLET, FILM COATED ORAL at 20:54

## 2024-06-08 RX ADMIN — RACEPINEPHRINE HYDROCHLORIDE 0.5 ML: 11.25 SOLUTION RESPIRATORY (INHALATION) at 00:48

## 2024-06-08 RX ADMIN — ENOXAPARIN SODIUM 40 MG: 40 INJECTION SUBCUTANEOUS at 13:31

## 2024-06-08 RX ADMIN — IPRATROPIUM BROMIDE AND ALBUTEROL SULFATE 3 ML: .5; 3 SOLUTION RESPIRATORY (INHALATION) at 00:20

## 2024-06-08 RX ADMIN — METHYLPREDNISOLONE SODIUM SUCCINATE 40 MG: 40 INJECTION, POWDER, FOR SOLUTION INTRAMUSCULAR; INTRAVENOUS at 20:44

## 2024-06-08 RX ADMIN — MONTELUKAST 10 MG: 10 TABLET, FILM COATED ORAL at 20:54

## 2024-06-08 RX ADMIN — METHYLPREDNISOLONE SODIUM SUCCINATE 40 MG: 40 INJECTION, POWDER, FOR SOLUTION INTRAMUSCULAR; INTRAVENOUS at 13:31

## 2024-06-08 RX ADMIN — Medication 5 MG: at 20:54

## 2024-06-08 RX ADMIN — DEXAMETHASONE SODIUM PHOSPHATE 10 MG: 4 INJECTION, SOLUTION INTRAMUSCULAR; INTRAVENOUS at 02:00

## 2024-06-08 RX ADMIN — METOPROLOL TARTRATE 12.5 MG: 25 TABLET, FILM COATED ORAL at 20:53

## 2024-06-08 SDOH — SOCIAL STABILITY: SOCIAL INSECURITY: ARE THERE ANY APPARENT SIGNS OF INJURIES/BEHAVIORS THAT COULD BE RELATED TO ABUSE/NEGLECT?: NO

## 2024-06-08 SDOH — SOCIAL STABILITY: SOCIAL INSECURITY: WERE YOU ABLE TO COMPLETE ALL THE BEHAVIORAL HEALTH SCREENINGS?: YES

## 2024-06-08 SDOH — SOCIAL STABILITY: SOCIAL INSECURITY: ABUSE: ADULT

## 2024-06-08 SDOH — SOCIAL STABILITY: SOCIAL INSECURITY: ARE YOU OR HAVE YOU BEEN THREATENED OR ABUSED PHYSICALLY, EMOTIONALLY, OR SEXUALLY BY ANYONE?: NO

## 2024-06-08 SDOH — SOCIAL STABILITY: SOCIAL INSECURITY: DO YOU FEEL UNSAFE GOING BACK TO THE PLACE WHERE YOU ARE LIVING?: NO

## 2024-06-08 SDOH — SOCIAL STABILITY: SOCIAL INSECURITY: HAVE YOU HAD ANY THOUGHTS OF HARMING ANYONE ELSE?: NO

## 2024-06-08 SDOH — SOCIAL STABILITY: SOCIAL INSECURITY: HAVE YOU HAD THOUGHTS OF HARMING ANYONE ELSE?: NO

## 2024-06-08 SDOH — SOCIAL STABILITY: SOCIAL INSECURITY: DOES ANYONE TRY TO KEEP YOU FROM HAVING/CONTACTING OTHER FRIENDS OR DOING THINGS OUTSIDE YOUR HOME?: NO

## 2024-06-08 SDOH — SOCIAL STABILITY: SOCIAL INSECURITY: DO YOU FEEL ANYONE HAS EXPLOITED OR TAKEN ADVANTAGE OF YOU FINANCIALLY OR OF YOUR PERSONAL PROPERTY?: NO

## 2024-06-08 SDOH — SOCIAL STABILITY: SOCIAL INSECURITY: HAS ANYONE EVER THREATENED TO HURT YOUR FAMILY OR YOUR PETS?: NO

## 2024-06-08 ASSESSMENT — LIFESTYLE VARIABLES
HOW OFTEN DO YOU HAVE A DRINK CONTAINING ALCOHOL: NEVER
HOW OFTEN DO YOU HAVE 6 OR MORE DRINKS ON ONE OCCASION: NEVER
AUDIT-C TOTAL SCORE: 0
SKIP TO QUESTIONS 9-10: 1
HOW MANY STANDARD DRINKS CONTAINING ALCOHOL DO YOU HAVE ON A TYPICAL DAY: PATIENT DOES NOT DRINK
AUDIT-C TOTAL SCORE: 0

## 2024-06-08 ASSESSMENT — PATIENT HEALTH QUESTIONNAIRE - PHQ9
SUM OF ALL RESPONSES TO PHQ9 QUESTIONS 1 & 2: 0
2. FEELING DOWN, DEPRESSED OR HOPELESS: NOT AT ALL
1. LITTLE INTEREST OR PLEASURE IN DOING THINGS: NOT AT ALL

## 2024-06-08 ASSESSMENT — ACTIVITIES OF DAILY LIVING (ADL)
LACK_OF_TRANSPORTATION: NO
FEEDING YOURSELF: DEPENDENT
TOILETING: DEPENDENT
GROOMING: DEPENDENT
JUDGMENT_ADEQUATE_SAFELY_COMPLETE_DAILY_ACTIVITIES: YES
HEARING - LEFT EAR: FUNCTIONAL
PATIENT'S MEMORY ADEQUATE TO SAFELY COMPLETE DAILY ACTIVITIES?: YES
HEARING - RIGHT EAR: FUNCTIONAL
BATHING: DEPENDENT
ADEQUATE_TO_COMPLETE_ADL: YES
DRESSING YOURSELF: DEPENDENT
ASSISTIVE_DEVICE: OTHER (COMMENT);WHEELCHAIR
WALKS IN HOME: DEPENDENT

## 2024-06-08 ASSESSMENT — COGNITIVE AND FUNCTIONAL STATUS - GENERAL: PATIENT BASELINE BEDBOUND: YES

## 2024-06-08 ASSESSMENT — PAIN SCALES - GENERAL: PAINLEVEL_OUTOF10: 0 - NO PAIN

## 2024-06-08 ASSESSMENT — PAIN - FUNCTIONAL ASSESSMENT
PAIN_FUNCTIONAL_ASSESSMENT: 0-10
PAIN_FUNCTIONAL_ASSESSMENT: 0-10

## 2024-06-08 NOTE — H&P
HPI:  Anisha Quevedo is a 28 y.o. year old female with a history of cerebral palsy with spastic quadriplegia, asthma, schizophrenia who presents for shortness of breath.  She had a recent admission for pneumonia at [facility] and was discharged today.  She developed shortness of breath and wheezing at her group home, her breathing became labored and EMS was called.  She was given bronchodilator and was brought to the ED for further evaluation.    ED workup significant for tachycardia, tachypnea.  Chest x-ray negative for acute process.  CT angio negative for central PE.  CT neck demonstrated a mildly enlarged adenoids with mild narrowing of the nasopharynx.  Patient given Decadron, racemic epi, racemic epi as needed bronchodilators, and Unasyn in the ED.    Past Medical History: as above, GERD, scoliosis s/p fixation  Past Surgical History: PEG tube, hip surgery   Family History: Hypertension, leukemia   Social History: denies smoking, EtOH, illicit drug use  Code Status: Full code    Review of Systems (Bold = positive)  Constitutional; Fever, chills, anorexia, weight loss, weakness  Eyes:  Blurry vision, diplopia, vision loss  ENT: Nasal congestion, earache or sore throat  Chest: Cough, dyspnea, hemoptysis, wheezing, pleurisy, Chest Pain,  palpitations, dyspnea on exertion, orthopnea  Gastrointestinal: Abdominal pain, nausea, vomiting, diarrhea, constipation, melena, hematemesis, hematochezia  Genitourinary: Dysuria, hematuria, frequency, urgency, flank pain  Musculoskeletal: Arthralgia, myalgia, weakness  Neurological: Dizziness, light-headedness, headache, syncope  Psychiatric: anxiety, depression, hallucinations  Skin: Rash, pruritis, rash, lesions  Endocrine: Heat intolerance, cold intolerance, polyuria, polydipsia  Hematologic: Bruising      Visit Vitals  /84   Pulse (!) 108   Temp 36.8 °C (98.2 °F) (Temporal)   Resp 19      Physical Exam:  General: Alert, awake, cooperative.  HEENT:  Normocephalic,  atraumatic.  Neck:  Trachea midline.  Neck supple.  Chest: Scattered wheezes bilaterally.  No crackles.  CV:  Regula rhythm, tachycardic.  Positive S1/S2.  GI: Bowel sounds present in all four quadrants, abdomen is soft, non-tender, non-distended.  Extremities:  No lower extremity edema or cyanosis.  Neurological: Alert and oriented.  Bilateral spasticity of extremities.  Skin:  Warm and dry.      Lab Results   Component Value Date    WBC 7.4 06/07/2024    HGB 14.5 06/07/2024    HCT 43.5 06/07/2024    MCV 91 06/07/2024     06/07/2024      Lab Results   Component Value Date    GLUCOSE 100 (H) 06/07/2024    CALCIUM 9.2 06/07/2024     06/07/2024    K 3.6 06/07/2024    CO2 25 06/07/2024     06/07/2024    BUN 9 06/07/2024    CREATININE 0.58 06/07/2024      Medications:  ampicillin-sulbactam, 3 g, intravenous, q6h  enoxaparin, 40 mg, subcutaneous, q24h  fluticasone, 2 spray, Each Nostril, Daily  [START ON 6/9/2024] fluticasone furoate-vilanteroL, 1 puff, inhalation, Daily  hydrOXYzine HCL, 50 mg, oral, Nightly  ipratropium-albuteroL, 3 mL, nebulization, q6h while awake  melatonin, 5 mg, oral, Nightly  methylPREDNISolone sodium succinate (PF), 40 mg, intravenous, q8h  metoprolol tartrate, 12.5 mg, oral, BID  montelukast, 10 mg, oral, Nightly  [START ON 6/9/2024] pantoprazole, 40 mg, oral, Daily before breakfast  [START ON 6/9/2024] risperiDONE, 0.5 mg, oral, BID  risperiDONE, 3.5 mg, oral, Nightly       Assessment/ Plan:  28 y.o. year old female with a history of cerebral palsy with spastic quadriplegia, asthma, schizophrenia who presents for shortness of breath.    #Acute on chronic asthma exacerbation  #Acute adenoiditis  -Continue Solumedrol and bronchodilators  -Racemic epi as needed  -Continue Unasyn.  If patient does not improve/clinically worsens consider ENT consult.  -Monitor on continuous pulse ox  -Keep n.p.o. except meds for now    #Schizophrenia  #GERD  #HTN  -Continue risperidone, PPI,  metoprolol  Attending note/  Patient seen and examined by me.  Residents note and assessment and treatment and plan reviewed.  Agree with a/p and rx plan .

## 2024-06-08 NOTE — ED PROVIDER NOTES
Patient was signed out to me pending CT neck.  She is a 28-year-old female with a history of spastic cerebral palsy presenting to the emergency department in the setting of shortness of breath.  She was recently just discharged today from [facility] when she was diagnosed with bilateral pneumonia and asthma exacerbation.  Reportedly it felt similar to an asthma exacerbation.  She did get worked up here and had notable inspiratory stridor for which she had improvement with racemic epi.  She is pending a CT neck for evaluation.    CT neck without acute findings just some enlarged adenoids.  She remained stable here without any recurrent episodes of stridor.  Improved respirations however we will plan for admission for continued treatment of asthma exacerbation.  Patient on bedside assessment appears comfortable tachycardic but stable.     Gill Escamilla MD  06/08/24 2518
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
## 2024-06-08 NOTE — ED TRIAGE NOTES
Pt arrived from [facility] home via eMS for increased SOB today, Pt discharged form [facility] today for admission 3 days ago for PNA, Malathi garcia

## 2024-06-08 NOTE — PROGRESS NOTES
Pharmacy Medication History Review    Anisha Quevedo is a 28 y.o. female admitted for Shortness of breath. Pharmacy reviewed the patient's [id number] medications and allergies for accuracy.    The list below reflectives the updated PTA list. Please review each medication in order reconciliation for additional clarification and justification.  Prior to Admission medications    Medication Sig Start Date End Date Taking? Authorizing Provider   acetaminophen (Tylenol) 325 mg tablet Take 1-2 tablets (325-650 mg) by mouth every 4 hours if needed for fever (temp greater than 38.0 C) (or pain). No more than 3000 mg per day    Historical Provider, MD   albuterol 2.5 mg /3 mL (0.083 %) nebulizer solution Take 3 mL (2.5 mg) by nebulization 3 times a day. 5/29/24   Historical Provider, MD   albuterol 90 mcg/actuation inhaler Inhale 2 puffs 4 times a day as needed. 6/28/19   Historical Provider, MD   alum-mag hydroxide-simeth (Maalox MAX) 400-400-40 mg/5 mL suspension Take 15 mL by mouth once daily as needed.    Historical Provider, MD   ammonium lactate (Amlactin) 12 % cream Apply 1 Application topically once daily. Apply to right heal 2/3/22   Historical Provider, MD   budesonide (Rhinocort AQ) 32 mcg/actuation nasal spray Administer 2 sprays into each nostril once daily. 10/11/23 10/10/24  Jhonny Sánchez MD   budesonide-formoteroL (Symbicort) 80-4.5 mcg/actuation inhaler Inhale 2 puffs 2 times a day. 4/11/22   Historical Provider, MD   cholecalciferol (Vitamin D3) 50 MCG (2000 UT) tablet Take 1 tablet (50 mcg) by mouth once daily. 11/26/23 11/25/24  Shelia Garcia MD   ciclopirox (Loprox) 0.77 % gel Apply 1 Application topically once daily. Apply to right great toenail 3/5/23   Historical Provider, MD   famotidine (Pepcid) 20 mg tablet Take 1 tablet (20 mg) by mouth once daily. Take on an empty stomach 6/28/23   JESSICA Cedillo-CNP   ferrous sulfate 325 (65 Fe) MG tablet Take 1 tablet by mouth once daily. 6/28/19    Historical Provider, MD   hydrOXYzine HCL (Atarax) 50 mg tablet Take 1 tablet (50 mg) by mouth once daily at bedtime. 4/15/24   Matt Stevens MD   inhaler,assist device,lg mask (AEROCHAMBER PLUS FLOW-VU,L MSK MISC) Use as directed with inhaler    Historical Provider, MD   ipratropium-albuteroL (Duo-Neb) 0.5-2.5 mg/3 mL nebulizer solution Take 3 mL by nebulization 4 times a day. For 2 weeks 5/10/24   Shelia Garcia MD   melatonin 5 mg capsule Take 1 capsule (5 mg) by mouth once daily at bedtime. 4/15/24   Matt Stevens MD   metoprolol tartrate (Lopressor) 25 mg tablet Take 0.5 tablets (12.5 mg) by mouth 2 times a day. 3/5/23   Historical Provider, MD   montelukast (Singulair) 10 mg tablet Take 1 tablet (10 mg) by mouth once daily at bedtime. 1/13/21   Historical Provider, MD   multivitamin tablet Take 1 tablet by mouth every other day. 11/17/22   Historical Provider, MD   mupirocin (Bactroban) 2 % ointment Apply a pea-size amount inside both nostrils once a day 10/31/22   Historical Provider, MD   naproxen (Naprosyn) 125 mg/5 mL suspension Take 15 mL (375 mg) by mouth 2 times a day as needed (for pain).    Historical Provider, MD   omeprazole (PriLOSEC) 20 mg DR capsule Take 1 capsule (20 mg) by mouth once daily in the morning. Take before meals. 5/1/24   Historical Provider, MD   omeprazole OTC (PriLOSEC OTC) 20 mg EC tablet Take 1 tablet (20 mg) by mouth once daily. Take on an empty stomach 6/28/23   Bianka Garza APRN-CNP   predniSONE (Deltasone) 20 mg tablet Take 2 tablets (40 mg) by mouth once daily. 5/29/24 6/3/24  Historical Provider, MD   Refresh Plus 0.5 % ophthalmic solution Administer 1 drop into both eyes 2 times a day. 12/28/22   Historical Provider, MD   risperiDONE (RisperDAL) 0.5 mg tablet Take 1 tablet (0.5 mg) by mouth twice daily - morning and 2pm. 4/15/24   Matt Stevens MD   risperiDONE (RisperDAL) 1 mg tablet Take 0.5 tablets (0.5 mg) by mouth once daily at bedtime.  4/15/24 7/14/24  Matt Stevens MD   risperiDONE (RisperDAL) 3 mg tablet Take 1 tablet (3 mg) by mouth once daily at bedtime. 4/15/24   Matt Stevens MD   sodium chloride (Nasal Moisturizing) 0.65 % nasal spray Administer 1 spray into each nostril 2 times a day as needed (for dryness).    Historical Provider, MD        The list below reflectives the updated allergy list. Please review each documented allergy for additional clarification and justification.  Allergies  Reviewed by Yola Borjas on 6/8/2024   No Known Allergies         Below are additional concerns with the patient's PTA list.    Medication list from [facility], printed 06/07/2024.    Yola Borjas

## 2024-06-09 LAB
ANION GAP SERPL CALC-SCNC: 13 MMOL/L (ref 10–20)
APPEARANCE UR: CLEAR
BILIRUB UR STRIP.AUTO-MCNC: NEGATIVE MG/DL
BUN SERPL-MCNC: 9 MG/DL (ref 6–23)
CALCIUM SERPL-MCNC: 9.1 MG/DL (ref 8.6–10.3)
CHLORIDE SERPL-SCNC: 100 MMOL/L (ref 98–107)
CO2 SERPL-SCNC: 26 MMOL/L (ref 21–32)
COLOR UR: ABNORMAL
CREAT SERPL-MCNC: 0.39 MG/DL (ref 0.5–1.05)
EGFRCR SERPLBLD CKD-EPI 2021: >90 ML/MIN/1.73M*2
ERYTHROCYTE [DISTWIDTH] IN BLOOD BY AUTOMATED COUNT: 13.4 % (ref 11.5–14.5)
GLUCOSE SERPL-MCNC: 119 MG/DL (ref 74–99)
GLUCOSE UR STRIP.AUTO-MCNC: NORMAL MG/DL
HCT VFR BLD AUTO: 39.3 % (ref 36–46)
HGB BLD-MCNC: 13.1 G/DL (ref 12–16)
HOLD SPECIMEN: NORMAL
KETONES UR STRIP.AUTO-MCNC: ABNORMAL MG/DL
LEUKOCYTE ESTERASE UR QL STRIP.AUTO: NEGATIVE
MCH RBC QN AUTO: 30.3 PG (ref 26–34)
MCHC RBC AUTO-ENTMCNC: 33.3 G/DL (ref 32–36)
MCV RBC AUTO: 91 FL (ref 80–100)
NITRITE UR QL STRIP.AUTO: NEGATIVE
NRBC BLD-RTO: 0 /100 WBCS (ref 0–0)
PH UR STRIP.AUTO: 7 [PH]
PLATELET # BLD AUTO: 280 X10*3/UL (ref 150–450)
POTASSIUM SERPL-SCNC: 4.4 MMOL/L (ref 3.5–5.3)
PROT UR STRIP.AUTO-MCNC: ABNORMAL MG/DL
RBC # BLD AUTO: 4.33 X10*6/UL (ref 4–5.2)
RBC # UR STRIP.AUTO: NEGATIVE /UL
RBC #/AREA URNS AUTO: NORMAL /HPF
SODIUM SERPL-SCNC: 135 MMOL/L (ref 136–145)
SP GR UR STRIP.AUTO: 1.03
SQUAMOUS #/AREA URNS AUTO: NORMAL /HPF
UROBILINOGEN UR STRIP.AUTO-MCNC: NORMAL MG/DL
WBC # BLD AUTO: 9.4 X10*3/UL (ref 4.4–11.3)
WBC #/AREA URNS AUTO: NORMAL /HPF

## 2024-06-09 PROCEDURE — 2500000001 HC RX 250 WO HCPCS SELF ADMINISTERED DRUGS (ALT 637 FOR MEDICARE OP): Performed by: STUDENT IN AN ORGANIZED HEALTH CARE EDUCATION/TRAINING PROGRAM

## 2024-06-09 PROCEDURE — 81001 URINALYSIS AUTO W/SCOPE: CPT | Performed by: STUDENT IN AN ORGANIZED HEALTH CARE EDUCATION/TRAINING PROGRAM

## 2024-06-09 PROCEDURE — 2500000002 HC RX 250 W HCPCS SELF ADMINISTERED DRUGS (ALT 637 FOR MEDICARE OP, ALT 636 FOR OP/ED): Mod: MUE | Performed by: INTERNAL MEDICINE

## 2024-06-09 PROCEDURE — 94640 AIRWAY INHALATION TREATMENT: CPT | Mod: MUE

## 2024-06-09 PROCEDURE — 2500000002 HC RX 250 W HCPCS SELF ADMINISTERED DRUGS (ALT 637 FOR MEDICARE OP, ALT 636 FOR OP/ED): Performed by: STUDENT IN AN ORGANIZED HEALTH CARE EDUCATION/TRAINING PROGRAM

## 2024-06-09 PROCEDURE — 1200000002 HC GENERAL ROOM WITH TELEMETRY DAILY

## 2024-06-09 PROCEDURE — 85027 COMPLETE CBC AUTOMATED: CPT | Performed by: STUDENT IN AN ORGANIZED HEALTH CARE EDUCATION/TRAINING PROGRAM

## 2024-06-09 PROCEDURE — 80048 BASIC METABOLIC PNL TOTAL CA: CPT | Performed by: STUDENT IN AN ORGANIZED HEALTH CARE EDUCATION/TRAINING PROGRAM

## 2024-06-09 PROCEDURE — 2500000004 HC RX 250 GENERAL PHARMACY W/ HCPCS (ALT 636 FOR OP/ED): Performed by: INTERNAL MEDICINE

## 2024-06-09 PROCEDURE — 2500000004 HC RX 250 GENERAL PHARMACY W/ HCPCS (ALT 636 FOR OP/ED): Performed by: STUDENT IN AN ORGANIZED HEALTH CARE EDUCATION/TRAINING PROGRAM

## 2024-06-09 PROCEDURE — 2500000002 HC RX 250 W HCPCS SELF ADMINISTERED DRUGS (ALT 637 FOR MEDICARE OP, ALT 636 FOR OP/ED): Mod: MUE | Performed by: STUDENT IN AN ORGANIZED HEALTH CARE EDUCATION/TRAINING PROGRAM

## 2024-06-09 PROCEDURE — 36415 COLL VENOUS BLD VENIPUNCTURE: CPT | Performed by: STUDENT IN AN ORGANIZED HEALTH CARE EDUCATION/TRAINING PROGRAM

## 2024-06-09 PROCEDURE — 93010 ELECTROCARDIOGRAM REPORT: CPT | Performed by: INTERNAL MEDICINE

## 2024-06-09 RX ORDER — BENZONATATE 100 MG/1
100 CAPSULE ORAL 3 TIMES DAILY PRN
Status: DISCONTINUED | OUTPATIENT
Start: 2024-06-09 | End: 2024-06-18 | Stop reason: HOSPADM

## 2024-06-09 RX ADMIN — IPRATROPIUM BROMIDE AND ALBUTEROL SULFATE 3 ML: .5; 3 SOLUTION RESPIRATORY (INHALATION) at 19:00

## 2024-06-09 RX ADMIN — METHYLPREDNISOLONE SODIUM SUCCINATE 40 MG: 40 INJECTION, POWDER, FOR SOLUTION INTRAMUSCULAR; INTRAVENOUS at 20:22

## 2024-06-09 RX ADMIN — METHYLPREDNISOLONE SODIUM SUCCINATE 40 MG: 40 INJECTION, POWDER, FOR SOLUTION INTRAMUSCULAR; INTRAVENOUS at 13:30

## 2024-06-09 RX ADMIN — IPRATROPIUM BROMIDE AND ALBUTEROL SULFATE 3 ML: .5; 3 SOLUTION RESPIRATORY (INHALATION) at 12:13

## 2024-06-09 RX ADMIN — AMPICILLIN SODIUM AND SULBACTAM SODIUM 3 G: 2; 1 INJECTION, POWDER, FOR SOLUTION INTRAMUSCULAR; INTRAVENOUS at 11:56

## 2024-06-09 RX ADMIN — AMPICILLIN SODIUM AND SULBACTAM SODIUM 3 G: 2; 1 INJECTION, POWDER, FOR SOLUTION INTRAMUSCULAR; INTRAVENOUS at 23:17

## 2024-06-09 RX ADMIN — FLUTICASONE PROPIONATE 2 SPRAY: 50 SPRAY, METERED NASAL at 09:41

## 2024-06-09 RX ADMIN — PANTOPRAZOLE SODIUM 40 MG: 40 TABLET, DELAYED RELEASE ORAL at 05:41

## 2024-06-09 RX ADMIN — IPRATROPIUM BROMIDE AND ALBUTEROL SULFATE 3 ML: .5; 3 SOLUTION RESPIRATORY (INHALATION) at 21:01

## 2024-06-09 RX ADMIN — FLUTICASONE FUROATE AND VILANTEROL TRIFENATATE 1 PUFF: 100; 25 POWDER RESPIRATORY (INHALATION) at 07:15

## 2024-06-09 RX ADMIN — RISPERIDONE 0.5 MG: 1 TABLET, FILM COATED ORAL at 09:38

## 2024-06-09 RX ADMIN — METOPROLOL TARTRATE 12.5 MG: 25 TABLET, FILM COATED ORAL at 09:39

## 2024-06-09 RX ADMIN — AMPICILLIN SODIUM AND SULBACTAM SODIUM 3 G: 2; 1 INJECTION, POWDER, FOR SOLUTION INTRAMUSCULAR; INTRAVENOUS at 05:41

## 2024-06-09 RX ADMIN — METOPROLOL TARTRATE 12.5 MG: 25 TABLET, FILM COATED ORAL at 20:22

## 2024-06-09 RX ADMIN — METHYLPREDNISOLONE SODIUM SUCCINATE 40 MG: 40 INJECTION, POWDER, FOR SOLUTION INTRAMUSCULAR; INTRAVENOUS at 03:56

## 2024-06-09 RX ADMIN — MONTELUKAST 10 MG: 10 TABLET, FILM COATED ORAL at 20:22

## 2024-06-09 RX ADMIN — HYDROXYZINE HYDROCHLORIDE 50 MG: 25 TABLET ORAL at 20:22

## 2024-06-09 RX ADMIN — Medication 5 MG: at 20:22

## 2024-06-09 RX ADMIN — ENOXAPARIN SODIUM 40 MG: 40 INJECTION SUBCUTANEOUS at 12:39

## 2024-06-09 RX ADMIN — AMPICILLIN SODIUM AND SULBACTAM SODIUM 3 G: 2; 1 INJECTION, POWDER, FOR SOLUTION INTRAMUSCULAR; INTRAVENOUS at 16:49

## 2024-06-09 RX ADMIN — RISPERIDONE 3.5 MG: 1 TABLET, FILM COATED ORAL at 20:22

## 2024-06-09 RX ADMIN — IPRATROPIUM BROMIDE AND ALBUTEROL SULFATE 3 ML: .5; 3 SOLUTION RESPIRATORY (INHALATION) at 07:18

## 2024-06-09 RX ADMIN — RISPERIDONE 0.5 MG: 1 TABLET, FILM COATED ORAL at 13:28

## 2024-06-09 RX ADMIN — RACEPINEPHRINE HYDROCHLORIDE 0.5 ML: 11.25 SOLUTION RESPIRATORY (INHALATION) at 09:46

## 2024-06-09 ASSESSMENT — COGNITIVE AND FUNCTIONAL STATUS - GENERAL
EATING MEALS: A LOT
MOVING FROM LYING ON BACK TO SITTING ON SIDE OF FLAT BED WITH BEDRAILS: TOTAL
MOVING TO AND FROM BED TO CHAIR: TOTAL
DRESSING REGULAR LOWER BODY CLOTHING: TOTAL
TOILETING: TOTAL
DRESSING REGULAR UPPER BODY CLOTHING: TOTAL
WALKING IN HOSPITAL ROOM: TOTAL
PERSONAL GROOMING: TOTAL
TURNING FROM BACK TO SIDE WHILE IN FLAT BAD: TOTAL
HELP NEEDED FOR BATHING: TOTAL
MOBILITY SCORE: 6
STANDING UP FROM CHAIR USING ARMS: TOTAL
CLIMB 3 TO 5 STEPS WITH RAILING: TOTAL
DAILY ACTIVITIY SCORE: 7

## 2024-06-09 ASSESSMENT — PAIN SCALES - GENERAL
PAINLEVEL_OUTOF10: 0 - NO PAIN
PAINLEVEL_OUTOF10: 0 - NO PAIN

## 2024-06-09 NOTE — CARE PLAN
Problem: Pain  Goal: My pain/discomfort is manageable  6/9/2024 1423 by Brittani Rios LPN  Outcome: Progressing  6/9/2024 1423 by Brittani Rios LPN  Outcome: Progressing  6/9/2024 1422 by Brittani Rios LPN  Outcome: Progressing   The patient's goals for the shift include      The clinical goals for the shift include No s/s of respiratory distress

## 2024-06-09 NOTE — CARE PLAN
The patient's goals for the shift include      The clinical goals for the shift include No s/s of respiratory distress

## 2024-06-09 NOTE — CARE PLAN
The patient's goals for the shift include      The clinical goals for the shift include  Patient will have no s/sx of respiratory distress    Problem: Respiratory  Goal: Minimal/no exertional discomfort or dyspnea this shift  Outcome: Progressing  Goal: No signs of respiratory distress (eg. Use of accessory muscles. Peds grunting)  Outcome: Progressing  Goal: Patent airway maintained this shift  Outcome: Progressing  Goal: Verbalize decreased shortness of breath this shift  Outcome: Progressing     Problem: Skin  Goal: Participates in plan/prevention/treatment measures  Outcome: Progressing  Goal: Prevent/manage excess moisture  Outcome: Progressing  Goal: Prevent/minimize sheer/friction injuries  Outcome: Progressing  Goal: Promote/optimize nutrition  Outcome: Progressing

## 2024-06-09 NOTE — PROGRESS NOTES
Anisha Quevedo is a 28 y.o. female on day 1 of admission presenting with Shortness of breath.      Subjective   Patient seen and examined by me today.  Patient is resting in bed and remains on 2 L nasal cannula oxygen and is pulse oxing 96%.  Patient states she is feeling better.  She remains on IV steroids and DuoNeb aerosol treatments and cough is improving.       Objective     Last Recorded Vitals  /77 (BP Location: Right arm, Patient Position: Lying)   Pulse 89   Temp 36.4 °C (97.5 °F) (Temporal)   Resp 16   Wt 77.1 kg (170 lb)   SpO2 96%   Intake/Output last 3 Shifts:    Intake/Output Summary (Last 24 hours) at 6/9/2024 1126  Last data filed at 6/9/2024 1020  Gross per 24 hour   Intake 1417.5 ml   Output 15 ml   Net 1402.5 ml       Admission Weight  Weight: 77.1 kg (170 lb) (06/07/24 2035)    Daily Weight  06/07/24 : 77.1 kg (170 lb)    Image Results  CT soft tissue neck wo IV contrast  Narrative: Interpreted By:  Alejandrina Waller,   STUDY:  CT SOFT TISSUE NECK WO IV CONTRAST;  6/8/2024 3:42 am      INDICATION:  Signs/Symptoms:stridor, eval for upper airway obstruction.      COMPARISON:  CT head 06/20/2019      ACCESSION NUMBER(S):  [id number]      ORDERING CLINICIAN:  MEGAN WHITE      TECHNIQUE:  Axial CT images of the neck obtained with no contrast administration.  Coronal and sagittal reformats were performed.      FINDINGS:      There is motion artifact.      MUCOSAL SPACES AND CERVICAL SOFT TISSUES: There are mildly enlarged  adenoids with mild narrowing of the nasopharynx. No obvious fluid  collection.      CERVICAL LYMPH NODES: There are multiple bilateral mildly prominent  cervical lymph nodes, not pathologically enlarged by CT criteria.      VISUALIZED INTRACRANIAL STRUCTURES AND ORBITS: Unremarkable.      CENTRAL AIRWAY AND LUNG APICES: There is mild narrowing of  nasopharynx. Otherwise, the central airway is patent. The lung apices  are clear.      THYROID GLAND: Unremarkable unenhanced  appearance.      PAROTID AND SUBMANDIBULAR GLANDS: No adjacent inflammatory changes.  No sialolithiasis.      PARANASAL SINUSES: Small amount of mucus at the bilateral sphenoid  sinuses. There is mild mucosal thickening at the left maxillary sinus.      OSSEOUS STRUCTURES: Multilevel degenerative changes of the cervical  spine with reversal of the cervical lordosis. Partially visualized  orthopedic hardware at the upper thoracic spine.      Impression: 1.  Mildly enlarged adenoids with mild narrowing of the nasopharynx;  please correlate for acute adenoiditis. Otherwise, the central airway  is patent .              MACRO:  None.      Signed by: Aeljandrina Waller 6/8/2024 4:05 AM  Dictation workstation:   [id number]  CT angio chest for pulmonary embolism  Narrative: STUDY:  CT Angiogram of the Chest; 6/7/2024 11:56 PM.  INDICATION:  Tachycardia.  Elevated d-dimer.  Evaluate for pulmonary embolism.  COMPARISON:  CXR 6/7/2024.  ACCESSION NUMBER(S):  [id number]  ORDERING CLINICIAN:  MEGAN WHITE  TECHNIQUE:  CTA of the chest was performed with intravenous contrast.   Images are reviewed and processed at a workstation according to the CT  angiogram protocol with 3-D and/or MIP post processing imaging  generated.  Omnipaque 350 90 mL was administered intravenously.  Automated mA/kV exposure control was utilized and patient examination  was performed in strict accordance with principles of ALARA.  FINDINGS:  Examination is significantly degraded by respiratory motion.  As such,  there is suboptimal evaluation of the segmental and subsegmental  pulmonary arterial branches.  No large central pulmonary embolus is  otherwise identified.  The thoracic aorta is normal in course and  caliber without dissection or aneurysm.  The heart is normal in size without pericardial effusion.  Thoracic  lymph nodes are not enlarged.  There is no pleural effusion, pleural thickening, or pneumothorax.   The airways are patent.  Minimal  bibasilar atelectasis.  Lungs otherwise clear without distinct  consolidation or evidence for pulmonary edema.  Upper abdomen demonstrates no acute pathology.  There are no acute fractures.  No suspicious bony lesions.  Thoracal  lumbar scoliosis again demonstrated with postoperative changes of  thoracolumbar fixation with Auguste rods in place.  Impression: Suboptimal evaluation of the segmental/subsegmental pulmonary arterial  branches due to extensive respiratory motion.  No central pulmonary  embolus otherwise identified.  No distinct acute intrathoracic process otherwise seen.  Signed by Dwayne Weir MD      Physical Exam  Vitals and nursing note reviewed.   Constitutional:       General: She is not in acute distress.     Appearance: Normal appearance. She is normal weight. She is not ill-appearing or toxic-appearing.   HENT:      Head: Normocephalic and atraumatic.      Right Ear: Tympanic membrane and ear canal normal. There is no impacted cerumen.      Left Ear: Tympanic membrane, ear canal and external ear normal.      Nose: Nose normal. No congestion or rhinorrhea.      Mouth/Throat:      Pharynx: Oropharynx is clear. No oropharyngeal exudate or posterior oropharyngeal erythema.   Eyes:      General: No scleral icterus.        Right eye: No discharge.         Left eye: No discharge.      Extraocular Movements: Extraocular movements intact.      Conjunctiva/sclera: Conjunctivae normal.      Pupils: Pupils are equal, round, and reactive to light.   Neck:      Vascular: No carotid bruit.   Cardiovascular:      Rate and Rhythm: Normal rate and regular rhythm.      Pulses: Normal pulses.      Heart sounds: Normal heart sounds. No murmur heard.     No gallop.   Pulmonary:      Effort: Pulmonary effort is normal. No respiratory distress.      Breath sounds: Rhonchi present. No wheezing or rales.   Abdominal:      General: Abdomen is flat. Bowel sounds are normal. There is no distension.      Palpations:  Abdomen is soft. There is no mass.      Tenderness: There is no abdominal tenderness. There is no right CVA tenderness, left CVA tenderness, guarding or rebound.      Hernia: No hernia is present.   Musculoskeletal:         General: No swelling or tenderness. Normal range of motion.      Cervical back: Normal range of motion and neck supple. No rigidity.      Right lower leg: No edema.      Left lower leg: No edema.   Lymphadenopathy:      Cervical: No cervical adenopathy.   Skin:     General: Skin is warm.      Coloration: Skin is not jaundiced.      Findings: No erythema or rash.   Neurological:      General: No focal deficit present.      Mental Status: She is alert and oriented to person, place, and time. Mental status is at baseline.      Sensory: No sensory deficit.      Motor: No weakness.      Gait: Gait normal.      Deep Tendon Reflexes: Reflexes normal.   Psychiatric:         Mood and Affect: Mood normal.         Thought Content: Thought content normal.         Judgment: Judgment normal.         Relevant Results    Current Facility-Administered Medications:     ampicillin-sulbactam (Unasyn) in sodium chloride 0.9 % 100 mL 3 g, 3 g, intravenous, q6h, Lorraine Moraes DO, Stopped at 06/09/24 1719    enoxaparin (Lovenox) syringe 40 mg, 40 mg, subcutaneous, q24h, Charles Mead MD, 40 mg at 06/09/24 1239    fluticasone (Flonase) nasal spray 2 spray, 2 spray, Each Nostril, Daily, Lorraine Moraes DO, 2 spray at 06/09/24 0941    fluticasone furoate-vilanteroL (Breo Ellipta) 100-25 mcg/dose inhaler 1 puff, 1 puff, inhalation, Daily, Lorraine Moraes DO, 1 puff at 06/09/24 0715    hydrOXYzine HCL (Atarax) tablet 50 mg, 50 mg, oral, Nightly, Lorraine Moraes DO, 50 mg at 06/08/24 2054    ipratropium-albuteroL (Duo-Neb) 0.5-2.5 mg/3 mL nebulizer solution 3 mL, 3 mL, nebulization, q2h PRN, Jaime Velazquez MD, 3 mL at 06/08/24 2356    ipratropium-albuteroL (Duo-Neb) 0.5-2.5 mg/3 mL nebulizer solution 3 mL, 3 mL,  nebulization, q6h while awake, Charles Mead MD, 3 mL at 06/09/24 1213    lactated Ringer's infusion, 75 mL/hr, intravenous, Continuous, Lorraine Moraes DO, Last Rate: 75 mL/hr at 06/09/24 1552, 75 mL/hr at 06/09/24 1552    melatonin tablet 5 mg, 5 mg, oral, Nightly, Sohaib Moraes, DO, 5 mg at 06/08/24 2054    methylPREDNISolone sod succinate (SOLU-Medrol) 40 mg/mL injection 40 mg, 40 mg, intravenous, q8h, Sorylanb Aleisha, DO, 40 mg at 06/09/24 1330    metoprolol tartrate (Lopressor) tablet 12.5 mg, 12.5 mg, oral, BID, Sohaib Moraes, DO, 12.5 mg at 06/09/24 0939    montelukast (Singulair) tablet 10 mg, 10 mg, oral, Nightly, Sohaib Aleisha, DO, 10 mg at 06/08/24 2054    pantoprazole (ProtoNix) EC tablet 40 mg, 40 mg, oral, Daily before breakfast, Sorylanb Aleisha, DO, 40 mg at 06/09/24 0541    polyethylene glycol (Glycolax, Miralax) packet 17 g, 17 g, oral, Daily PRN, Sohitesh Moraes DO    racepinephrine (Asthmanefrin) 2.25 % nebulizer solution 0.5 mL, 0.5 mL, nebulization, q3h PRN, Jaime Velazquez MD, 0.5 mL at 06/09/24 0946    risperiDONE (RisperDAL) tablet 0.5 mg, 0.5 mg, oral, BID, Sorylanb Aleisha, DO, 0.5 mg at 06/09/24 1328    risperiDONE (RisperDAL) tablet 3.5 mg, 3.5 mg, oral, Nightly, Sohaib Moraes, DO, 3.5 mg at 06/08/24 2054   Results for orders placed or performed during the hospital encounter of 06/07/24 (from the past 96 hour(s))   CBC and Auto Differential   Result Value Ref Range    WBC 7.4 4.4 - 11.3 x10*3/uL    nRBC 0.0 0.0 - 0.0 /100 WBCs    RBC 4.76 4.00 - 5.20 x10*6/uL    Hemoglobin 14.5 12.0 - 16.0 g/dL    Hematocrit 43.5 36.0 - 46.0 %    MCV 91 80 - 100 fL    MCH 30.5 26.0 - 34.0 pg    MCHC 33.3 32.0 - 36.0 g/dL    RDW 13.6 11.5 - 14.5 %    Platelets 285 150 - 450 x10*3/uL    Neutrophils % 63.3 40.0 - 80.0 %    Immature Granulocytes %, Automated 0.5 0.0 - 0.9 %    Lymphocytes % 22.9 13.0 - 44.0 %    Monocytes % 11.7 2.0 - 10.0 %    Eosinophils % 1.1 0.0 - 6.0 %    Basophils % 0.5 0.0 - 2.0 %     Neutrophils Absolute 4.68 1.20 - 7.70 x10*3/uL    Immature Granulocytes Absolute, Automated 0.04 0.00 - 0.70 x10*3/uL    Lymphocytes Absolute 1.70 1.20 - 4.80 x10*3/uL    Monocytes Absolute 0.87 0.10 - 1.00 x10*3/uL    Eosinophils Absolute 0.08 0.00 - 0.70 x10*3/uL    Basophils Absolute 0.04 0.00 - 0.10 x10*3/uL   Comprehensive Metabolic Panel   Result Value Ref Range    Glucose 100 (H) 74 - 99 mg/dL    Sodium 138 136 - 145 mmol/L    Potassium 3.6 3.5 - 5.3 mmol/L    Chloride 103 98 - 107 mmol/L    Bicarbonate 25 21 - 32 mmol/L    Anion Gap 14 10 - 20 mmol/L    Urea Nitrogen 9 6 - 23 mg/dL    Creatinine 0.58 0.50 - 1.05 mg/dL    eGFR >90 >60 mL/min/1.73m*2    Calcium 9.2 8.6 - 10.3 mg/dL    Albumin 4.0 3.4 - 5.0 g/dL    Alkaline Phosphatase 63 33 - 110 U/L    Total Protein 7.5 6.4 - 8.2 g/dL    AST 11 9 - 39 U/L    Bilirubin, Total 0.3 0.0 - 1.2 mg/dL    ALT 14 7 - 45 U/L   Lactate   Result Value Ref Range    Lactate 1.8 0.4 - 2.0 mmol/L   Blood Culture    Specimen: Peripheral Venipuncture; Blood culture   Result Value Ref Range    Blood Culture No growth at 1 day    Troponin I, High Sensitivity   Result Value Ref Range    Troponin I, High Sensitivity 4 0 - 13 ng/L   B-Type Natriuretic Peptide   Result Value Ref Range    BNP 6 0 - 99 pg/mL   TSH with reflex to Free T4 if abnormal   Result Value Ref Range    Thyroid Stimulating Hormone 2.10 0.44 - 3.98 mIU/L   Human Chorionic Gonadotropin, Serum Quantitative   Result Value Ref Range    HCG, Beta-Quantitative <2 <5 mIU/mL   Blood Culture    Specimen: Peripheral Venipuncture; Blood culture   Result Value Ref Range    Blood Culture No growth at 1 day    D-Dimer, VTE Exclusion   Result Value Ref Range    D-Dimer, Quantitative VTE Exclusion 579 (H) <=500 ng/mL FEU   Basic Metabolic Panel   Result Value Ref Range    Glucose 119 (H) 74 - 99 mg/dL    Sodium 135 (L) 136 - 145 mmol/L    Potassium 4.4 3.5 - 5.3 mmol/L    Chloride 100 98 - 107 mmol/L    Bicarbonate 26 21 - 32  mmol/L    Anion Gap 13 10 - 20 mmol/L    Urea Nitrogen 9 6 - 23 mg/dL    Creatinine 0.39 (L) 0.50 - 1.05 mg/dL    eGFR >90 >60 mL/min/1.73m*2    Calcium 9.1 8.6 - 10.3 mg/dL   CBC   Result Value Ref Range    WBC 9.4 4.4 - 11.3 x10*3/uL    nRBC 0.0 0.0 - 0.0 /100 WBCs    RBC 4.33 4.00 - 5.20 x10*6/uL    Hemoglobin 13.1 12.0 - 16.0 g/dL    Hematocrit 39.3 36.0 - 46.0 %    MCV 91 80 - 100 fL    MCH 30.3 26.0 - 34.0 pg    MCHC 33.3 32.0 - 36.0 g/dL    RDW 13.4 11.5 - 14.5 %    Platelets 280 150 - 450 x10*3/uL   Urinalysis with Reflex Culture and Microscopic   Result Value Ref Range    Color, Urine Light-Yellow Light-Yellow, Yellow, Dark-Yellow    Appearance, Urine Clear Clear    Specific Gravity, Urine 1.026 1.005 - 1.035    pH, Urine 7.0 5.0, 5.5, 6.0, 6.5, 7.0, 7.5, 8.0    Protein, Urine 10 (TRACE) NEGATIVE, 10 (TRACE), 20 (TRACE) mg/dL    Glucose, Urine Normal Normal mg/dL    Blood, Urine NEGATIVE NEGATIVE    Ketones, Urine 20 (1+) (A) NEGATIVE mg/dL    Bilirubin, Urine NEGATIVE NEGATIVE    Urobilinogen, Urine Normal Normal mg/dL    Nitrite, Urine NEGATIVE NEGATIVE    Leukocyte Esterase, Urine NEGATIVE NEGATIVE   Extra Urine Gray Tube   Result Value Ref Range    Extra Tube Hold for add-ons.    Urinalysis Microscopic   Result Value Ref Range    WBC, Urine NONE 1-5, NONE /HPF    RBC, Urine 1-2 NONE, 1-2, 3-5 /HPF    Squamous Epithelial Cells, Urine 1-9 (SPARSE) Reference range not established. /HPF    CT soft tissue neck wo IV contrast    Result Date: 6/8/2024  Interpreted By:  Alejandrina Waller, STUDY: CT SOFT TISSUE NECK WO IV CONTRAST;  6/8/2024 3:42 am   INDICATION: Signs/Symptoms:stridor, eval for upper airway obstruction.   COMPARISON: CT head 06/20/2019   ACCESSION NUMBER(S): [id number]   ORDERING CLINICIAN: MEGAN WHITE   TECHNIQUE: Axial CT images of the neck obtained with no contrast administration. Coronal and sagittal reformats were performed.   FINDINGS:   There is motion artifact.   MUCOSAL SPACES AND  CERVICAL SOFT TISSUES: There are mildly enlarged adenoids with mild narrowing of the nasopharynx. No obvious fluid collection.   CERVICAL LYMPH NODES: There are multiple bilateral mildly prominent cervical lymph nodes, not pathologically enlarged by CT criteria.   VISUALIZED INTRACRANIAL STRUCTURES AND ORBITS: Unremarkable.   CENTRAL AIRWAY AND LUNG APICES: There is mild narrowing of nasopharynx. Otherwise, the central airway is patent. The lung apices are clear.   THYROID GLAND: Unremarkable unenhanced appearance.   PAROTID AND SUBMANDIBULAR GLANDS: No adjacent inflammatory changes. No sialolithiasis.   PARANASAL SINUSES: Small amount of mucus at the bilateral sphenoid sinuses. There is mild mucosal thickening at the left maxillary sinus.   OSSEOUS STRUCTURES: Multilevel degenerative changes of the cervical spine with reversal of the cervical lordosis. Partially visualized orthopedic hardware at the upper thoracic spine.       1.  Mildly enlarged adenoids with mild narrowing of the nasopharynx; please correlate for acute adenoiditis. Otherwise, the central airway is patent .       MACRO: None.   Signed by: Alejandrina Waller 6/8/2024 4:05 AM Dictation workstation:   [id number]    CT angio chest for pulmonary embolism    Result Date: 6/8/2024  STUDY: CT Angiogram of the Chest; 6/7/2024 11:56 PM. INDICATION: Tachycardia.  Elevated d-dimer.  Evaluate for pulmonary embolism. COMPARISON: CXR 6/7/2024. ACCESSION NUMBER(S): [id number] ORDERING CLINICIAN: MEGAN WHITE TECHNIQUE:  CTA of the chest was performed with intravenous contrast. Images are reviewed and processed at a workstation according to the CT angiogram protocol with 3-D and/or MIP post processing imaging generated.  Omnipaque 350 90 mL was administered intravenously. Automated mA/kV exposure control was utilized and patient examination was performed in strict accordance with principles of ALARA. FINDINGS: Examination is significantly degraded by  respiratory motion.  As such, there is suboptimal evaluation of the segmental and subsegmental pulmonary arterial branches.  No large central pulmonary embolus is otherwise identified.  The thoracic aorta is normal in course and caliber without dissection or aneurysm. The heart is normal in size without pericardial effusion.  Thoracic lymph nodes are not enlarged. There is no pleural effusion, pleural thickening, or pneumothorax. The airways are patent. Minimal bibasilar atelectasis.  Lungs otherwise clear without distinct consolidation or evidence for pulmonary edema. Upper abdomen demonstrates no acute pathology. There are no acute fractures.  No suspicious bony lesions.  Thoracal lumbar scoliosis again demonstrated with postoperative changes of thoracolumbar fixation with Auguste rods in place.    Suboptimal evaluation of the segmental/subsegmental pulmonary arterial branches due to extensive respiratory motion.  No central pulmonary embolus otherwise identified. No distinct acute intrathoracic process otherwise seen. Signed by Dwayne Weir MD    XR chest 1 view    Result Date: 6/7/2024  STUDY: Chest Radiograph;  06/07/2024 at 9:27 PM INDICATION: Recent diagnosis of pneumonia, presents with increasing shortness of breath. COMPARISON: XR chest 05/03/24, 12/06/23, 06/30/19. ACCESSION NUMBER(S): [id number] ORDERING CLINICIAN: MEGAN WHITE TECHNIQUE:  Frontal chest was obtained at 2127 hours. FINDINGS: CARDIOMEDIASTINAL SILHOUETTE: Cardiomediastinal silhouette is normal in size and configuration.  LUNGS: Lungs are clear. There is no pneumothorax.  ABDOMEN: No remarkable upper abdominal findings.  Posterior spinal rods extending throughout the thoracic spine.    No detectable active cardiopulmonary disease. Signed by Jonathan Camargo MD           Assessment/Plan   This patient currently has cardiac telemetry ordered; if you would like to modify or discontinue the telemetry order, click here to go to the orders  activity to modify/discontinue the order.    #1/dyspnea/acute exacerbation of asthmatic bronchitis and cough productive of phlegm/elevated D-dimer on admission and CT of the chest done on admission was negative for any acute but suboptimal study/no acute infiltrates or congestion seen on admission and chest x-ray was negative.  Patient has no leukocytosis and she is afebrile.  Tachycardia and tachypnea has improved.  Patient remains on IV Solu-Medrol and will be switched and is clinically improving.  Patient remains on IV Unasyn for her productive cough and acute bronchitis.  Blood cultures on admission are negative.  Patient is clinically improving.  Swallowing evaluation will be done as patient has some upper airway gurgling.  Patient remains on Singulair 10 mg p.o. daily.    2./History of cerebral palsy/quadriplegia/generalized anxiety disorder and schizoaffective disorder and patient has been resumed on her psych medications.  Patient is clinically improving.    Labs and vitals noted.  Medications reconciled.  Total time spent 30 minutes/time spent on patient interaction/assessment and plan and documentation and discussing plan of care with nursing.    Principal Problem:    Shortness of breath                  Charles Mead MD

## 2024-06-10 ENCOUNTER — APPOINTMENT (OUTPATIENT)
Dept: CARDIOLOGY | Facility: HOSPITAL | Age: 29
DRG: 202 | End: 2024-06-10
Payer: MEDICARE

## 2024-06-10 LAB
ATRIAL RATE: 127 BPM
ATRIAL RATE: 64 BPM
CARDIAC TROPONIN I PNL SERPL HS: <3 NG/L (ref 0–13)
HOLD SPECIMEN: NORMAL
HOLD SPECIMEN: NORMAL
P AXIS: -11 DEGREES
P AXIS: 52 DEGREES
P OFFSET: 204 MS
P OFFSET: 207 MS
P ONSET: 159 MS
P ONSET: 160 MS
PR INTERVAL: 116 MS
PR INTERVAL: 128 MS
Q ONSET: 218 MS
Q ONSET: 223 MS
QRS COUNT: 10 BEATS
QRS COUNT: 21 BEATS
QRS DURATION: 128 MS
QRS DURATION: 78 MS
QT INTERVAL: 314 MS
QT INTERVAL: 406 MS
QTC CALCULATION(BAZETT): 418 MS
QTC CALCULATION(BAZETT): 456 MS
QTC FREDERICIA: 403 MS
QTC FREDERICIA: 414 MS
R AXIS: -23 DEGREES
R AXIS: 19 DEGREES
T AXIS: 117 DEGREES
T AXIS: 24 DEGREES
T OFFSET: 375 MS
T OFFSET: 426 MS
VENTRICULAR RATE: 127 BPM
VENTRICULAR RATE: 64 BPM

## 2024-06-10 PROCEDURE — 2500000005 HC RX 250 GENERAL PHARMACY W/O HCPCS: Performed by: INTERNAL MEDICINE

## 2024-06-10 PROCEDURE — 2500000005 HC RX 250 GENERAL PHARMACY W/O HCPCS: Performed by: STUDENT IN AN ORGANIZED HEALTH CARE EDUCATION/TRAINING PROGRAM

## 2024-06-10 PROCEDURE — 2500000004 HC RX 250 GENERAL PHARMACY W/ HCPCS (ALT 636 FOR OP/ED): Performed by: INTERNAL MEDICINE

## 2024-06-10 PROCEDURE — 94640 AIRWAY INHALATION TREATMENT: CPT | Mod: MUE

## 2024-06-10 PROCEDURE — 99222 1ST HOSP IP/OBS MODERATE 55: CPT | Performed by: STUDENT IN AN ORGANIZED HEALTH CARE EDUCATION/TRAINING PROGRAM

## 2024-06-10 PROCEDURE — 93005 ELECTROCARDIOGRAM TRACING: CPT

## 2024-06-10 PROCEDURE — 92610 EVALUATE SWALLOWING FUNCTION: CPT | Mod: GN

## 2024-06-10 PROCEDURE — 1200000002 HC GENERAL ROOM WITH TELEMETRY DAILY

## 2024-06-10 PROCEDURE — 2500000002 HC RX 250 W HCPCS SELF ADMINISTERED DRUGS (ALT 637 FOR MEDICARE OP, ALT 636 FOR OP/ED): Mod: MUE | Performed by: INTERNAL MEDICINE

## 2024-06-10 PROCEDURE — 2500000001 HC RX 250 WO HCPCS SELF ADMINISTERED DRUGS (ALT 637 FOR MEDICARE OP): Performed by: INTERNAL MEDICINE

## 2024-06-10 PROCEDURE — 2500000002 HC RX 250 W HCPCS SELF ADMINISTERED DRUGS (ALT 637 FOR MEDICARE OP, ALT 636 FOR OP/ED): Mod: MUE | Performed by: STUDENT IN AN ORGANIZED HEALTH CARE EDUCATION/TRAINING PROGRAM

## 2024-06-10 PROCEDURE — 2500000002 HC RX 250 W HCPCS SELF ADMINISTERED DRUGS (ALT 637 FOR MEDICARE OP, ALT 636 FOR OP/ED): Performed by: STUDENT IN AN ORGANIZED HEALTH CARE EDUCATION/TRAINING PROGRAM

## 2024-06-10 PROCEDURE — 2500000001 HC RX 250 WO HCPCS SELF ADMINISTERED DRUGS (ALT 637 FOR MEDICARE OP): Performed by: STUDENT IN AN ORGANIZED HEALTH CARE EDUCATION/TRAINING PROGRAM

## 2024-06-10 PROCEDURE — 36415 COLL VENOUS BLD VENIPUNCTURE: CPT | Performed by: INTERNAL MEDICINE

## 2024-06-10 PROCEDURE — 84484 ASSAY OF TROPONIN QUANT: CPT | Performed by: INTERNAL MEDICINE

## 2024-06-10 PROCEDURE — 2500000004 HC RX 250 GENERAL PHARMACY W/ HCPCS (ALT 636 FOR OP/ED): Performed by: STUDENT IN AN ORGANIZED HEALTH CARE EDUCATION/TRAINING PROGRAM

## 2024-06-10 PROCEDURE — 93010 ELECTROCARDIOGRAM REPORT: CPT | Performed by: INTERNAL MEDICINE

## 2024-06-10 RX ORDER — LIDOCAINE 560 MG/1
1 PATCH PERCUTANEOUS; TOPICAL; TRANSDERMAL DAILY
Status: COMPLETED | OUTPATIENT
Start: 2024-06-10 | End: 2024-06-14

## 2024-06-10 RX ORDER — IPRATROPIUM BROMIDE AND ALBUTEROL SULFATE 2.5; .5 MG/3ML; MG/3ML
3 SOLUTION RESPIRATORY (INHALATION)
Status: DISCONTINUED | OUTPATIENT
Start: 2024-06-10 | End: 2024-06-11

## 2024-06-10 RX ORDER — DOCUSATE SODIUM 100 MG/1
100 CAPSULE, LIQUID FILLED ORAL 2 TIMES DAILY PRN
Status: DISCONTINUED | OUTPATIENT
Start: 2024-06-10 | End: 2024-06-18 | Stop reason: HOSPADM

## 2024-06-10 RX ADMIN — FLUTICASONE FUROATE AND VILANTEROL TRIFENATATE 1 PUFF: 100; 25 POWDER RESPIRATORY (INHALATION) at 07:06

## 2024-06-10 RX ADMIN — MONTELUKAST 10 MG: 10 TABLET, FILM COATED ORAL at 20:16

## 2024-06-10 RX ADMIN — IPRATROPIUM BROMIDE AND ALBUTEROL SULFATE 3 ML: .5; 3 SOLUTION RESPIRATORY (INHALATION) at 19:16

## 2024-06-10 RX ADMIN — METHYLPREDNISOLONE SODIUM SUCCINATE 40 MG: 40 INJECTION, POWDER, FOR SOLUTION INTRAMUSCULAR; INTRAVENOUS at 13:05

## 2024-06-10 RX ADMIN — METHYLPREDNISOLONE SODIUM SUCCINATE 40 MG: 40 INJECTION, POWDER, FOR SOLUTION INTRAMUSCULAR; INTRAVENOUS at 20:16

## 2024-06-10 RX ADMIN — RISPERIDONE 0.5 MG: 1 TABLET, FILM COATED ORAL at 08:40

## 2024-06-10 RX ADMIN — FLUTICASONE PROPIONATE 2 SPRAY: 50 SPRAY, METERED NASAL at 08:44

## 2024-06-10 RX ADMIN — METOPROLOL TARTRATE 12.5 MG: 25 TABLET, FILM COATED ORAL at 08:42

## 2024-06-10 RX ADMIN — AMPICILLIN SODIUM AND SULBACTAM SODIUM 3 G: 2; 1 INJECTION, POWDER, FOR SOLUTION INTRAMUSCULAR; INTRAVENOUS at 18:12

## 2024-06-10 RX ADMIN — METOPROLOL TARTRATE 12.5 MG: 25 TABLET, FILM COATED ORAL at 20:30

## 2024-06-10 RX ADMIN — ENOXAPARIN SODIUM 40 MG: 40 INJECTION SUBCUTANEOUS at 13:05

## 2024-06-10 RX ADMIN — HYDROXYZINE HYDROCHLORIDE 50 MG: 25 TABLET ORAL at 20:16

## 2024-06-10 RX ADMIN — Medication 5 MG: at 20:31

## 2024-06-10 RX ADMIN — SODIUM CHLORIDE, POTASSIUM CHLORIDE, SODIUM LACTATE AND CALCIUM CHLORIDE 75 ML/HR: 600; 310; 30; 20 INJECTION, SOLUTION INTRAVENOUS at 18:08

## 2024-06-10 RX ADMIN — IPRATROPIUM BROMIDE AND ALBUTEROL SULFATE 3 ML: .5; 3 SOLUTION RESPIRATORY (INHALATION) at 07:04

## 2024-06-10 RX ADMIN — IPRATROPIUM BROMIDE AND ALBUTEROL SULFATE 3 ML: .5; 3 SOLUTION RESPIRATORY (INHALATION) at 09:33

## 2024-06-10 RX ADMIN — AMPICILLIN SODIUM AND SULBACTAM SODIUM 3 G: 2; 1 INJECTION, POWDER, FOR SOLUTION INTRAMUSCULAR; INTRAVENOUS at 05:30

## 2024-06-10 RX ADMIN — AMPICILLIN SODIUM AND SULBACTAM SODIUM 3 G: 2; 1 INJECTION, POWDER, FOR SOLUTION INTRAMUSCULAR; INTRAVENOUS at 12:05

## 2024-06-10 RX ADMIN — BENZONATATE 100 MG: 100 CAPSULE ORAL at 08:42

## 2024-06-10 RX ADMIN — Medication 2 L/MIN: at 07:04

## 2024-06-10 RX ADMIN — IPRATROPIUM BROMIDE AND ALBUTEROL SULFATE 3 ML: .5; 3 SOLUTION RESPIRATORY (INHALATION) at 15:25

## 2024-06-10 RX ADMIN — METHYLPREDNISOLONE SODIUM SUCCINATE 40 MG: 40 INJECTION, POWDER, FOR SOLUTION INTRAMUSCULAR; INTRAVENOUS at 02:50

## 2024-06-10 RX ADMIN — RISPERIDONE 0.5 MG: 1 TABLET, FILM COATED ORAL at 13:09

## 2024-06-10 RX ADMIN — SODIUM CHLORIDE, POTASSIUM CHLORIDE, SODIUM LACTATE AND CALCIUM CHLORIDE 75 ML/HR: 600; 310; 30; 20 INJECTION, SOLUTION INTRAVENOUS at 02:50

## 2024-06-10 RX ADMIN — RISPERIDONE 3.5 MG: 1 TABLET, FILM COATED ORAL at 20:16

## 2024-06-10 RX ADMIN — IPRATROPIUM BROMIDE AND ALBUTEROL SULFATE 3 ML: .5; 3 SOLUTION RESPIRATORY (INHALATION) at 22:13

## 2024-06-10 RX ADMIN — LIDOCAINE 1 PATCH: 4 PATCH TOPICAL at 13:06

## 2024-06-10 RX ADMIN — PANTOPRAZOLE SODIUM 40 MG: 40 TABLET, DELAYED RELEASE ORAL at 05:30

## 2024-06-10 ASSESSMENT — COGNITIVE AND FUNCTIONAL STATUS - GENERAL
MOBILITY SCORE: 6
CLIMB 3 TO 5 STEPS WITH RAILING: TOTAL
DRESSING REGULAR LOWER BODY CLOTHING: TOTAL
EATING MEALS: A LOT
HELP NEEDED FOR BATHING: TOTAL
TOILETING: TOTAL
TOILETING: TOTAL
DRESSING REGULAR UPPER BODY CLOTHING: TOTAL
WALKING IN HOSPITAL ROOM: TOTAL
DAILY ACTIVITIY SCORE: 7
HELP NEEDED FOR BATHING: TOTAL
TURNING FROM BACK TO SIDE WHILE IN FLAT BAD: TOTAL
CLIMB 3 TO 5 STEPS WITH RAILING: TOTAL
DRESSING REGULAR LOWER BODY CLOTHING: TOTAL
MOVING FROM LYING ON BACK TO SITTING ON SIDE OF FLAT BED WITH BEDRAILS: TOTAL
STANDING UP FROM CHAIR USING ARMS: TOTAL
STANDING UP FROM CHAIR USING ARMS: TOTAL
MOVING TO AND FROM BED TO CHAIR: TOTAL
WALKING IN HOSPITAL ROOM: TOTAL
PERSONAL GROOMING: TOTAL
EATING MEALS: A LOT
PERSONAL GROOMING: TOTAL
MOVING FROM LYING ON BACK TO SITTING ON SIDE OF FLAT BED WITH BEDRAILS: TOTAL
DAILY ACTIVITIY SCORE: 7
TURNING FROM BACK TO SIDE WHILE IN FLAT BAD: TOTAL
DRESSING REGULAR UPPER BODY CLOTHING: TOTAL
MOBILITY SCORE: 6
MOVING TO AND FROM BED TO CHAIR: TOTAL

## 2024-06-10 ASSESSMENT — PAIN SCALES - GENERAL
PAINLEVEL_OUTOF10: 0 - NO PAIN
PAINLEVEL_OUTOF10: 0 - NO PAIN

## 2024-06-10 ASSESSMENT — PAIN - FUNCTIONAL ASSESSMENT: PAIN_FUNCTIONAL_ASSESSMENT: 0-10

## 2024-06-10 NOTE — CARE PLAN
The patient's goals for the shift include      The clinical goals for the shift include No s/s of respiratory distress      Problem: Respiratory  Goal: Minimal/no exertional discomfort or dyspnea this shift  Outcome: Progressing  Goal: No signs of respiratory distress (eg. Use of accessory muscles. Peds grunting)  Outcome: Progressing  Goal: Patent airway maintained this shift  Outcome: Progressing  Goal: Verbalize decreased shortness of breath this shift  Outcome: Progressing

## 2024-06-10 NOTE — PROGRESS NOTES
06/10/24 1106   Discharge Planning   Living Arrangements Other (Comment)   Support Systems Other (Comment)   Type of Residence senior care   Care Facility Name return to [facility]   Who is requesting discharge planning? Provider     Pt admit for sob, treated for asthma.  Pt is from [facility] and will return at discharge.  Spoke with  Lynda and pt can return at discharge.   Cathi Tubbs RN [facility]

## 2024-06-10 NOTE — CARE PLAN
The patient's goals for the shift include  to have breathing treatments    The clinical goals for the shift include no s/sx of respiratory distress    Patient remains on a continuous pulse oximetry
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
## 2024-06-10 NOTE — PROGRESS NOTES
Speech-Language Pathology    SLP Adult Inpatient Speech-Language Pathology Clinical Swallow Evaluation    Patient Name: Anisha Quevedo  MRN: [id number]  Today's Date: 6/10/2024   Time Calculation  Start Time: 1215  Stop Time: 1300  Time Calculation (min): 45 min         Current Problem:   1. Shortness of breath        2. Moderate asthma, unspecified whether complicated, unspecified whether persistent ([facility])          Assessment:  Patient is awake, pleasant and cooperative. Caregiver from group home at bedside and provides additional history/information. States they have been providing her with minced + moist solids and thin liquids at home. Patient and caregiver discussed some episodes of intermittent vomiting with PO intake, but that she has not vomited with PO intake since she has been in the hospital.   Therapist assessed patient during lunch meal. Patient requires total assist for PO intake. Adequate natural dentition present. No evidence of any facial drooping or asymmetry. Mild oral dysphagia noted with lingual thrusting and non-rotary chewing pattern. Mastication slow for small bites of soft, bite sized solids. More timely and prompt A-P transit and bolus formation with melted ice cream texture. Patient likes utilizing straw sips for increased independence during PO intake. This date, SLP assisted patient with straws sips. Patient able to verbalize that she needs to take small single sips, with good return demonstration. Swallow onset appeared relatively prompt. Cough noted during time spent in room but did not appear related to PO intake. Patient denies feeling like liquid went down the wrong way. Patient denying any odynophagia or sore throat this date. Subtle expiratory wheezing noted throughout assessment, which did not appear to increase over the course of PO intake. Vocal quality remained clear throughout assessment. No evidence of any vomiting after PO intake was stopped for several minutes.  "    Recommendations:  Additional Recommendations: GI? Suspect possible esophageal issues given symptoms of vomiting following PO intake, chest pain and history of reflux. Per caregiver, esophagram was recommended for the end of June.  Solid Diet Recommendations : Minced & moist/ground (IDDSI Level 5)  Liquid Diet Recommendations: Thin (IDDSI Level 0)  Compensatory Swallowing Strategies:   Decrease distractions during eating/feeding,   Upright 90 degrees as possible for all oral intake,   Remain upright for 20-30 minutes after meals,   One to one assist with meals,   Single sips,   Small bites/sips  Medication Administration Recommendations: 1-2 pills at a time (whole) with small sips of liquid.    Dysphagia Goals: start date 6/10/24:  Patient will tolerate recommended diet without observed clinical signs of aspiration.   Caregiver will demonstrate appropriate strategies for swallowing safety.  Advanced solid trials with SLP as deemed appropriate to determine safest LRD.  Patient/caregiver education.    Plan:  Treatment/Interventions: Assess diet tolerance  SLP Plan: Skilled SLP  SLP Frequency: 2x per week  Duration: 1 week  Discussed POC: Patient, Caregiver/family, Nursing, Physician  Patient/Caregiver Agreeable: Yes      Subjective   Current Problem:  Assess oropharyngeal swallowing mechanism       General Visit Information:  Patient Class: Inpatient  Living Environment: Group home  Ordering Physician: [place]  Reason for Referral: Rule out aspiration  Prior to Session Communication: Bedside nurse  Copied per chart review, \"Anisha Quevedo is a 28 y.o. year old female with a history of cerebral palsy with spastic quadriplegia, asthma, schizophrenia who presents for shortness of breath.  She had a recent admission for pneumonia at [facility] and was discharged recently.  She developed shortness of breath and wheezing at her group home, her breathing became labored and EMS was called.  She was given bronchodilator and " "was brought to the ED for further evaluation.\"     \"ED workup significant for tachycardia, tachypnea.  Chest x-ray negative for acute process.  CT angio negative for central PE.  CT neck demonstrated a mildly enlarged adenoids with mild narrowing of the nasopharynx.  Patient given Decadron, racemic epi, racemic epi as needed bronchodilators, and Unasyn in the ED.\"    Additional history per chart review includes GERD, oral dysphagia,and PEG tube for nutrition support. PEG tube was removed in January 2020. MBSS was completed in June 2020 with no evidence of aspiration and recommendations for regular solids/thin liquids.     Most recently patient was seen for outpatient MBS at [facility]'s d/t choking and vomiting with PO intake.  recommended continuing regular solids/thin liquids with use of safe swallow strategies but if she appears to truly be choking on solids, downgrade to soft + bite sized solids.    Vital Signs:   2L O2 NC (baseline is room air), afebrile, CT neck IMPRESSION:  1.  Mildly enlarged adenoids with mild narrowing of the nasopharynx;  please correlate for acute adenoiditis. Otherwise, the central airway  is patent.  6/7 CXR indicating lungs are clear.  6/7 CT chest indicating no evidence of central pulmonary embolus or acute process. Minimal basilar atelectasis.    Objective       Baseline Assessment:  Respiratory Status: Oxygen via nasal cannula      Pain:  Pain Score: 0 - No pain       Oral/Motor Assessment:  Dentition: Adequate/Natural      Inpatient:  Education Comments  Results/recommendations from swallowing assessment were reviewed with patient, nursing, and caregivers at bedside. All parties voiced understanding.      Consultations/Referrals/Coordination of Services:   1) Consider GI work-up d/t intermittent vomiting following PO intake; patient with history of EGD 1/27/23, which was completed d/t epigastric abdominal pain and atypical chest pain. Impressions were as follows:             - " Widely patent and non-obstructing Schatzki ring.                         - Z-line regular, 37 cm from the incisors.                         - 1 cm hiatal hernia.                         - Gastric diverticulum.                         - Normal examined duodenum.                         - No specimens collected.  2) Consider ENT work-up d/t expiratory wheezing and CT of neck showing mildly enlarged adenoids.

## 2024-06-11 DIAGNOSIS — J45.40 MODERATE PERSISTENT ASTHMA WITHOUT COMPLICATION (HHS-HCC): Primary | ICD-10-CM

## 2024-06-11 PROCEDURE — 2500000005 HC RX 250 GENERAL PHARMACY W/O HCPCS: Performed by: INTERNAL MEDICINE

## 2024-06-11 PROCEDURE — 2500000002 HC RX 250 W HCPCS SELF ADMINISTERED DRUGS (ALT 637 FOR MEDICARE OP, ALT 636 FOR OP/ED): Performed by: INTERNAL MEDICINE

## 2024-06-11 PROCEDURE — 94640 AIRWAY INHALATION TREATMENT: CPT | Mod: MUE

## 2024-06-11 PROCEDURE — 2500000002 HC RX 250 W HCPCS SELF ADMINISTERED DRUGS (ALT 637 FOR MEDICARE OP, ALT 636 FOR OP/ED): Performed by: STUDENT IN AN ORGANIZED HEALTH CARE EDUCATION/TRAINING PROGRAM

## 2024-06-11 PROCEDURE — 2500000004 HC RX 250 GENERAL PHARMACY W/ HCPCS (ALT 636 FOR OP/ED): Performed by: NURSE PRACTITIONER

## 2024-06-11 PROCEDURE — 1200000002 HC GENERAL ROOM WITH TELEMETRY DAILY

## 2024-06-11 PROCEDURE — 2500000004 HC RX 250 GENERAL PHARMACY W/ HCPCS (ALT 636 FOR OP/ED): Performed by: INTERNAL MEDICINE

## 2024-06-11 PROCEDURE — 2500000005 HC RX 250 GENERAL PHARMACY W/O HCPCS: Performed by: STUDENT IN AN ORGANIZED HEALTH CARE EDUCATION/TRAINING PROGRAM

## 2024-06-11 PROCEDURE — 2500000001 HC RX 250 WO HCPCS SELF ADMINISTERED DRUGS (ALT 637 FOR MEDICARE OP): Performed by: STUDENT IN AN ORGANIZED HEALTH CARE EDUCATION/TRAINING PROGRAM

## 2024-06-11 PROCEDURE — 2500000004 HC RX 250 GENERAL PHARMACY W/ HCPCS (ALT 636 FOR OP/ED): Performed by: STUDENT IN AN ORGANIZED HEALTH CARE EDUCATION/TRAINING PROGRAM

## 2024-06-11 PROCEDURE — 92526 ORAL FUNCTION THERAPY: CPT | Mod: GN | Performed by: SPEECH-LANGUAGE PATHOLOGIST

## 2024-06-11 RX ORDER — IPRATROPIUM BROMIDE AND ALBUTEROL SULFATE 2.5; .5 MG/3ML; MG/3ML
3 SOLUTION RESPIRATORY (INHALATION)
Status: DISCONTINUED | OUTPATIENT
Start: 2024-06-11 | End: 2024-06-14

## 2024-06-11 RX ORDER — POLYETHYLENE GLYCOL 3350 17 G/17G
17 POWDER, FOR SOLUTION ORAL 2 TIMES DAILY
Status: DISCONTINUED | OUTPATIENT
Start: 2024-06-11 | End: 2024-06-18 | Stop reason: HOSPADM

## 2024-06-11 RX ADMIN — FLUTICASONE PROPIONATE 2 SPRAY: 50 SPRAY, METERED NASAL at 09:00

## 2024-06-11 RX ADMIN — IPRATROPIUM BROMIDE AND ALBUTEROL SULFATE 3 ML: .5; 3 SOLUTION RESPIRATORY (INHALATION) at 19:04

## 2024-06-11 RX ADMIN — FLUTICASONE FUROATE AND VILANTEROL TRIFENATATE 1 PUFF: 100; 25 POWDER RESPIRATORY (INHALATION) at 06:52

## 2024-06-11 RX ADMIN — RISPERIDONE 0.5 MG: 1 TABLET, FILM COATED ORAL at 13:42

## 2024-06-11 RX ADMIN — AMPICILLIN SODIUM AND SULBACTAM SODIUM 3 G: 2; 1 INJECTION, POWDER, FOR SOLUTION INTRAMUSCULAR; INTRAVENOUS at 00:52

## 2024-06-11 RX ADMIN — METOPROLOL TARTRATE 12.5 MG: 25 TABLET, FILM COATED ORAL at 08:34

## 2024-06-11 RX ADMIN — ENOXAPARIN SODIUM 40 MG: 40 INJECTION SUBCUTANEOUS at 11:41

## 2024-06-11 RX ADMIN — METOPROLOL TARTRATE 12.5 MG: 25 TABLET, FILM COATED ORAL at 20:46

## 2024-06-11 RX ADMIN — IPRATROPIUM BROMIDE AND ALBUTEROL SULFATE 3 ML: .5; 3 SOLUTION RESPIRATORY (INHALATION) at 13:01

## 2024-06-11 RX ADMIN — Medication 5 MG: at 20:46

## 2024-06-11 RX ADMIN — IPRATROPIUM BROMIDE AND ALBUTEROL SULFATE 3 ML: .5; 3 SOLUTION RESPIRATORY (INHALATION) at 02:43

## 2024-06-11 RX ADMIN — METHYLPREDNISOLONE SODIUM SUCCINATE 40 MG: 40 INJECTION, POWDER, FOR SOLUTION INTRAMUSCULAR; INTRAVENOUS at 11:41

## 2024-06-11 RX ADMIN — POLYETHYLENE GLYCOL 3350 17 G: 17 POWDER, FOR SOLUTION ORAL at 21:03

## 2024-06-11 RX ADMIN — PANTOPRAZOLE SODIUM 40 MG: 40 TABLET, DELAYED RELEASE ORAL at 05:47

## 2024-06-11 RX ADMIN — MONTELUKAST 10 MG: 10 TABLET, FILM COATED ORAL at 20:46

## 2024-06-11 RX ADMIN — METHYLPREDNISOLONE SODIUM SUCCINATE 40 MG: 40 INJECTION, POWDER, FOR SOLUTION INTRAMUSCULAR; INTRAVENOUS at 02:42

## 2024-06-11 RX ADMIN — RISPERIDONE 3.5 MG: 1 TABLET, FILM COATED ORAL at 20:45

## 2024-06-11 RX ADMIN — RISPERIDONE 0.5 MG: 1 TABLET, FILM COATED ORAL at 08:33

## 2024-06-11 RX ADMIN — AMPICILLIN SODIUM AND SULBACTAM SODIUM 3 G: 2; 1 INJECTION, POWDER, FOR SOLUTION INTRAMUSCULAR; INTRAVENOUS at 05:43

## 2024-06-11 RX ADMIN — METHYLPREDNISOLONE SODIUM SUCCINATE 40 MG: 40 INJECTION, POWDER, FOR SOLUTION INTRAMUSCULAR; INTRAVENOUS at 20:40

## 2024-06-11 RX ADMIN — HYDROXYZINE HYDROCHLORIDE 50 MG: 25 TABLET ORAL at 20:45

## 2024-06-11 RX ADMIN — LIDOCAINE 1 PATCH: 4 PATCH TOPICAL at 08:34

## 2024-06-11 RX ADMIN — AMPICILLIN SODIUM AND SULBACTAM SODIUM 3 G: 2; 1 INJECTION, POWDER, FOR SOLUTION INTRAMUSCULAR; INTRAVENOUS at 18:05

## 2024-06-11 RX ADMIN — IPRATROPIUM BROMIDE AND ALBUTEROL SULFATE 3 ML: .5; 3 SOLUTION RESPIRATORY (INHALATION) at 06:45

## 2024-06-11 RX ADMIN — AMPICILLIN SODIUM AND SULBACTAM SODIUM 3 G: 2; 1 INJECTION, POWDER, FOR SOLUTION INTRAMUSCULAR; INTRAVENOUS at 11:41

## 2024-06-11 ASSESSMENT — COGNITIVE AND FUNCTIONAL STATUS - GENERAL
PERSONAL GROOMING: TOTAL
EATING MEALS: A LOT
HELP NEEDED FOR BATHING: TOTAL
CLIMB 3 TO 5 STEPS WITH RAILING: TOTAL
MOVING TO AND FROM BED TO CHAIR: TOTAL
DAILY ACTIVITIY SCORE: 7
WALKING IN HOSPITAL ROOM: TOTAL
STANDING UP FROM CHAIR USING ARMS: TOTAL
MOBILITY SCORE: 6
TURNING FROM BACK TO SIDE WHILE IN FLAT BAD: TOTAL
MOVING FROM LYING ON BACK TO SITTING ON SIDE OF FLAT BED WITH BEDRAILS: TOTAL
TOILETING: TOTAL
DRESSING REGULAR LOWER BODY CLOTHING: TOTAL
DRESSING REGULAR UPPER BODY CLOTHING: TOTAL

## 2024-06-11 ASSESSMENT — PAIN - FUNCTIONAL ASSESSMENT
PAIN_FUNCTIONAL_ASSESSMENT: 0-10
PAIN_FUNCTIONAL_ASSESSMENT: 0-10

## 2024-06-11 ASSESSMENT — PAIN SCALES - GENERAL
PAINLEVEL_OUTOF10: 0 - NO PAIN
PAINLEVEL_OUTOF10: 0 - NO PAIN

## 2024-06-11 NOTE — PROGRESS NOTES
Anisha Quevedo is a 28 y.o. female on day 3 of admission presenting with Shortness of breath.      Subjective          Objective     Last Recorded Vitals  /65   Pulse 95   Temp 36.8 °C (98.2 °F)   Resp 17   Wt 77.1 kg (170 lb)   SpO2 94%   Intake/Output last 3 Shifts:    Intake/Output Summary (Last 24 hours) at 6/11/2024 1909  Last data filed at 6/11/2024 1500  Gross per 24 hour   Intake 1150 ml   Output 2900 ml   Net -1750 ml       Admission Weight  Weight: 77.1 kg (170 lb) (06/07/24 2035)    Daily Weight  06/07/24 : 77.1 kg (170 lb)    Image Results  Electrocardiogram, 12-lead PRN ACS symptoms  Normal sinus rhythm  Nonspecific ST and T wave abnormality  Abnormal ECG  When compared with ECG of 10-CECI-2024 09:53, (unconfirmed)  No significant change was found  Electrocardiogram, 12-lead PRN ACS symptoms  Normal sinus rhythm with sinus arrhythmia  Nonspecific ST abnormality  Abnormal ECG  When compared with ECG of 07-JUN-2024 20:59, (unconfirmed)  QRS duration has decreased  ST elevation now present in Inferior leads  ST elevation now present in Lateral leads  T wave inversion now evident in Inferior leads  T wave inversion no longer evident in Lateral leads      Physical Exam  Vitals and nursing note reviewed.   Constitutional:       General: She is not in acute distress.     Appearance: Normal appearance. She is normal weight. She is not ill-appearing or toxic-appearing.   HENT:      Head: Normocephalic and atraumatic.      Right Ear: Tympanic membrane and ear canal normal. There is no impacted cerumen.      Left Ear: Tympanic membrane, ear canal and external ear normal.      Nose: Nose normal. No congestion or rhinorrhea.      Mouth/Throat:      Pharynx: Oropharynx is clear. No oropharyngeal exudate or posterior oropharyngeal erythema.   Eyes:      General: No scleral icterus.        Right eye: No discharge.         Left eye: No discharge.      Extraocular Movements: Extraocular movements intact.       Conjunctiva/sclera: Conjunctivae normal.      Pupils: Pupils are equal, round, and reactive to light.   Neck:      Vascular: No carotid bruit.   Cardiovascular:      Rate and Rhythm: Normal rate and regular rhythm.      Pulses: Normal pulses.      Heart sounds: Normal heart sounds. No murmur heard.     No gallop.   Pulmonary:      Effort: Pulmonary effort is normal. No tachypnea, accessory muscle usage or respiratory distress.      Breath sounds: Wheezing present. No rhonchi or rales.      Comments: Lung exam/patient has bilateral few expiratory wheezing and improving/decreased breath sounds at the bases/  Abdominal:      General: Abdomen is flat. Bowel sounds are normal. There is no distension.      Palpations: Abdomen is soft. There is no mass.      Tenderness: There is no abdominal tenderness. There is no right CVA tenderness, left CVA tenderness, guarding or rebound.      Hernia: No hernia is present.   Musculoskeletal:         General: No swelling or tenderness. Normal range of motion.      Cervical back: Normal range of motion and neck supple. No rigidity.      Right lower leg: No edema.      Left lower leg: No edema.   Lymphadenopathy:      Cervical: No cervical adenopathy.   Skin:     General: Skin is warm.      Coloration: Skin is not jaundiced.      Findings: No erythema or rash.   Neurological:      General: No focal deficit present.      Mental Status: She is alert and oriented to person, place, and time. Mental status is at baseline.      Sensory: No sensory deficit.      Motor: No weakness.      Gait: Gait normal.      Deep Tendon Reflexes: Reflexes normal.   Psychiatric:         Mood and Affect: Mood normal.         Thought Content: Thought content normal.         Judgment: Judgment normal.         Relevant Results    Current Facility-Administered Medications:     ampicillin-sulbactam (Unasyn) in sodium chloride 0.9 % 100 mL 3 g, 3 g, intravenous, q6h, Lorraine Moraes DO, Stopped at 06/11/24 9348     benzonatate (Tessalon) capsule 100 mg, 100 mg, oral, TID PRN, Charlse Mead MD, 100 mg at 06/10/24 0842    docusate sodium (Colace) capsule 100 mg, 100 mg, oral, BID PRN, Charles Mead MD    enoxaparin (Lovenox) syringe 40 mg, 40 mg, subcutaneous, q24h, Charles Mead MD, 40 mg at 06/11/24 1141    fluticasone (Flonase) nasal spray 2 spray, 2 spray, Each Nostril, Daily, Lorraine Moraes DO, 2 spray at 06/11/24 0900    fluticasone furoate-vilanteroL (Breo Ellipta) 100-25 mcg/dose inhaler 1 puff, 1 puff, inhalation, Daily, Lorraine Moraes DO, 1 puff at 06/11/24 0652    hydrOXYzine HCL (Atarax) tablet 50 mg, 50 mg, oral, Nightly, Lorraine Moraes DO, 50 mg at 06/11/24 2045    ipratropium-albuteroL (Duo-Neb) 0.5-2.5 mg/3 mL nebulizer solution 3 mL, 3 mL, nebulization, q2h PRN, Jaime Velazquez MD, 3 mL at 06/10/24 0933    ipratropium-albuteroL (Duo-Neb) 0.5-2.5 mg/3 mL nebulizer solution 3 mL, 3 mL, nebulization, q6h, Charles Mead MD, 3 mL at 06/11/24 1904    lidocaine 4 % patch 1 patch, 1 patch, transdermal, Daily, Aric Pearce MD PhD, 1 patch at 06/11/24 0834    lubricating eye drops ophthalmic solution 1 drop, 1 drop, Both Eyes, q8h PRN, Charles Mead MD    melatonin tablet 5 mg, 5 mg, oral, Nightly, Lorraine Moraes DO, 5 mg at 06/11/24 2046    methylPREDNISolone sod succinate (SOLU-Medrol) 40 mg/mL injection 40 mg, 40 mg, intravenous, q8h, Lorraine Moraes DO, 40 mg at 06/11/24 2040    metoprolol tartrate (Lopressor) tablet 12.5 mg, 12.5 mg, oral, BID, Lorraine Moraes DO, 12.5 mg at 06/11/24 2046    montelukast (Singulair) tablet 10 mg, 10 mg, oral, Nightly, Lorraine Moraes DO, 10 mg at 06/11/24 2046    oxygen (O2) therapy, , inhalation, Continuous PRN - O2/gases, Charles Mead MD, 1 L/min at 06/11/24 0900    pantoprazole (ProtoNix) EC tablet 40 mg, 40 mg, oral, Daily before breakfast, Lorraine Moraes DO, 40 mg at 06/11/24 0547    polyethylene glycol (Glycolax, Miralax) packet 17 g, 17 g, oral, Daily PRN, [facility]  [facility] Aleisha    polyethylene glycol (Glycolax, Miralax) powder 17 g, 17 g, oral, BID, Mago Pfeiffer, APRN-CNP    racepinephrine (Asthmanefrin) 2.25 % nebulizer solution 0.5 mL, 0.5 mL, nebulization, q3h PRN, Jaime Velazquez MD, 0.5 mL at 06/09/24 0946    risperiDONE (RisperDAL) tablet 0.5 mg, 0.5 mg, oral, BID, Sohitesh Moraes DO, 0.5 mg at 06/11/24 1342    risperiDONE (RisperDAL) tablet 3.5 mg, 3.5 mg, oral, Nightly, Sohaib Aleisha, , 3.5 mg at 06/11/24 2045   Results for orders placed or performed during the hospital encounter of 06/07/24 (from the past 96 hour(s))   CBC and Auto Differential   Result Value Ref Range    WBC 7.4 4.4 - 11.3 x10*3/uL    nRBC 0.0 0.0 - 0.0 /100 WBCs    RBC 4.76 4.00 - 5.20 x10*6/uL    Hemoglobin 14.5 12.0 - 16.0 g/dL    Hematocrit 43.5 36.0 - 46.0 %    MCV 91 80 - 100 fL    MCH 30.5 26.0 - 34.0 pg    MCHC 33.3 32.0 - 36.0 g/dL    RDW 13.6 11.5 - 14.5 %    Platelets 285 150 - 450 x10*3/uL    Neutrophils % 63.3 40.0 - 80.0 %    Immature Granulocytes %, Automated 0.5 0.0 - 0.9 %    Lymphocytes % 22.9 13.0 - 44.0 %    Monocytes % 11.7 2.0 - 10.0 %    Eosinophils % 1.1 0.0 - 6.0 %    Basophils % 0.5 0.0 - 2.0 %    Neutrophils Absolute 4.68 1.20 - 7.70 x10*3/uL    Immature Granulocytes Absolute, Automated 0.04 0.00 - 0.70 x10*3/uL    Lymphocytes Absolute 1.70 1.20 - 4.80 x10*3/uL    Monocytes Absolute 0.87 0.10 - 1.00 x10*3/uL    Eosinophils Absolute 0.08 0.00 - 0.70 x10*3/uL    Basophils Absolute 0.04 0.00 - 0.10 x10*3/uL   Comprehensive Metabolic Panel   Result Value Ref Range    Glucose 100 (H) 74 - 99 mg/dL    Sodium 138 136 - 145 mmol/L    Potassium 3.6 3.5 - 5.3 mmol/L    Chloride 103 98 - 107 mmol/L    Bicarbonate 25 21 - 32 mmol/L    Anion Gap 14 10 - 20 mmol/L    Urea Nitrogen 9 6 - 23 mg/dL    Creatinine 0.58 0.50 - 1.05 mg/dL    eGFR >90 >60 mL/min/1.73m*2    Calcium 9.2 8.6 - 10.3 mg/dL    Albumin 4.0 3.4 - 5.0 g/dL    Alkaline Phosphatase 63 33 - 110 U/L    Total Protein  7.5 6.4 - 8.2 g/dL    AST 11 9 - 39 U/L    Bilirubin, Total 0.3 0.0 - 1.2 mg/dL    ALT 14 7 - 45 U/L   Lactate   Result Value Ref Range    Lactate 1.8 0.4 - 2.0 mmol/L   Blood Culture    Specimen: Peripheral Venipuncture; Blood culture   Result Value Ref Range    Blood Culture No growth at 3 days    Troponin I, High Sensitivity   Result Value Ref Range    Troponin I, High Sensitivity 4 0 - 13 ng/L   B-Type Natriuretic Peptide   Result Value Ref Range    BNP 6 0 - 99 pg/mL   TSH with reflex to Free T4 if abnormal   Result Value Ref Range    Thyroid Stimulating Hormone 2.10 0.44 - 3.98 mIU/L   Human Chorionic Gonadotropin, Serum Quantitative   Result Value Ref Range    HCG, Beta-Quantitative <2 <5 mIU/mL   Blood Culture    Specimen: Peripheral Venipuncture; Blood culture   Result Value Ref Range    Blood Culture No growth at 3 days    D-Dimer, VTE Exclusion   Result Value Ref Range    D-Dimer, Quantitative VTE Exclusion 579 (H) <=500 ng/mL FEU   Basic Metabolic Panel   Result Value Ref Range    Glucose 119 (H) 74 - 99 mg/dL    Sodium 135 (L) 136 - 145 mmol/L    Potassium 4.4 3.5 - 5.3 mmol/L    Chloride 100 98 - 107 mmol/L    Bicarbonate 26 21 - 32 mmol/L    Anion Gap 13 10 - 20 mmol/L    Urea Nitrogen 9 6 - 23 mg/dL    Creatinine 0.39 (L) 0.50 - 1.05 mg/dL    eGFR >90 >60 mL/min/1.73m*2    Calcium 9.1 8.6 - 10.3 mg/dL   CBC   Result Value Ref Range    WBC 9.4 4.4 - 11.3 x10*3/uL    nRBC 0.0 0.0 - 0.0 /100 WBCs    RBC 4.33 4.00 - 5.20 x10*6/uL    Hemoglobin 13.1 12.0 - 16.0 g/dL    Hematocrit 39.3 36.0 - 46.0 %    MCV 91 80 - 100 fL    MCH 30.3 26.0 - 34.0 pg    MCHC 33.3 32.0 - 36.0 g/dL    RDW 13.4 11.5 - 14.5 %    Platelets 280 150 - 450 x10*3/uL   Urinalysis with Reflex Culture and Microscopic   Result Value Ref Range    Color, Urine Light-Yellow Light-Yellow, Yellow, Dark-Yellow    Appearance, Urine Clear Clear    Specific Gravity, Urine 1.026 1.005 - 1.035    pH, Urine 7.0 5.0, 5.5, 6.0, 6.5, 7.0, 7.5, 8.0     Protein, Urine 10 (TRACE) NEGATIVE, 10 (TRACE), 20 (TRACE) mg/dL    Glucose, Urine Normal Normal mg/dL    Blood, Urine NEGATIVE NEGATIVE    Ketones, Urine 20 (1+) (A) NEGATIVE mg/dL    Bilirubin, Urine NEGATIVE NEGATIVE    Urobilinogen, Urine Normal Normal mg/dL    Nitrite, Urine NEGATIVE NEGATIVE    Leukocyte Esterase, Urine NEGATIVE NEGATIVE   Extra Urine Gray Tube   Result Value Ref Range    Extra Tube Hold for add-ons.    Urinalysis Microscopic   Result Value Ref Range    WBC, Urine NONE 1-5, NONE /HPF    RBC, Urine 1-2 NONE, 1-2, 3-5 /HPF    Squamous Epithelial Cells, Urine 1-9 (SPARSE) Reference range not established. /HPF   Troponin I, High Sensitivity   Result Value Ref Range    Troponin I, High Sensitivity <3 0 - 13 ng/L   Lavender Top   Result Value Ref Range    Extra Tube Hold for add-ons.    SST TOP   Result Value Ref Range    Extra Tube Hold for add-ons.    Electrocardiogram, 12-lead PRN ACS symptoms   Result Value Ref Range    Ventricular Rate 64 BPM    Atrial Rate 64 BPM    TN Interval 136 ms    QRS Duration 86 ms    QT Interval 384 ms    QTC Calculation(Bazett) 396 ms    P Axis 25 degrees    R Axis 5 degrees    T Axis 15 degrees    QRS Count 10 beats    Q Onset 220 ms    P Onset 152 ms    P Offset 194 ms    T Offset 412 ms    QTC Fredericia 392 ms   Electrocardiogram, 12-lead PRN ACS symptoms   Result Value Ref Range    Ventricular Rate 100 BPM    Atrial Rate 100 BPM    TN Interval 140 ms    QRS Duration 66 ms    QT Interval 306 ms    QTC Calculation(Bazett) 394 ms    P Axis 45 degrees    R Axis 9 degrees    T Axis 24 degrees    QRS Count 17 beats    Q Onset 224 ms    P Onset 154 ms    P Offset 197 ms    T Offset 377 ms    QTC Fredericia 363 ms      No results found for this or any previous visit (from the past 24 hour(s)).      Electrocardiogram, 12-lead PRN ACS symptoms    Result Date: 6/11/2024  Normal sinus rhythm Nonspecific ST and T wave abnormality Abnormal ECG When compared with ECG of  10-CECI-2024 09:53, (unconfirmed) No significant change was found    Electrocardiogram, 12-lead PRN ACS symptoms    Result Date: 6/11/2024  Normal sinus rhythm with sinus arrhythmia Nonspecific ST abnormality Abnormal ECG When compared with ECG of 07-JUN-2024 20:59, (unconfirmed) QRS duration has decreased ST elevation now present in Inferior leads ST elevation now present in Lateral leads T wave inversion now evident in Inferior leads T wave inversion no longer evident in Lateral leads    ECG 12 lead    Result Date: 6/10/2024  Normal sinus rhythm Left ventricular hypertrophy with QRS widening and repolarization abnormality ( R in aVL , William product ) Abnormal ECG    ECG 12 lead    Result Date: 6/10/2024  Sinus tachycardia Nonspecific T wave abnormality Abnormal ECG When compared with ECG of 06-DEC-2023 15:08, No significant change was found    CT soft tissue neck wo IV contrast    Result Date: 6/8/2024  Interpreted By:  Alejandrina Waller, STUDY: CT SOFT TISSUE NECK WO IV CONTRAST;  6/8/2024 3:42 am   INDICATION: Signs/Symptoms:stridor, eval for upper airway obstruction.   COMPARISON: CT head 06/20/2019   ACCESSION NUMBER(S): [id number]   ORDERING CLINICIAN: MEGAN WHITE   TECHNIQUE: Axial CT images of the neck obtained with no contrast administration. Coronal and sagittal reformats were performed.   FINDINGS:   There is motion artifact.   MUCOSAL SPACES AND CERVICAL SOFT TISSUES: There are mildly enlarged adenoids with mild narrowing of the nasopharynx. No obvious fluid collection.   CERVICAL LYMPH NODES: There are multiple bilateral mildly prominent cervical lymph nodes, not pathologically enlarged by CT criteria.   VISUALIZED INTRACRANIAL STRUCTURES AND ORBITS: Unremarkable.   CENTRAL AIRWAY AND LUNG APICES: There is mild narrowing of nasopharynx. Otherwise, the central airway is patent. The lung apices are clear.   THYROID GLAND: Unremarkable unenhanced appearance.   PAROTID AND SUBMANDIBULAR GLANDS: No  adjacent inflammatory changes. No sialolithiasis.   PARANASAL SINUSES: Small amount of mucus at the bilateral sphenoid sinuses. There is mild mucosal thickening at the left maxillary sinus.   OSSEOUS STRUCTURES: Multilevel degenerative changes of the cervical spine with reversal of the cervical lordosis. Partially visualized orthopedic hardware at the upper thoracic spine.       1.  Mildly enlarged adenoids with mild narrowing of the nasopharynx; please correlate for acute adenoiditis. Otherwise, the central airway is patent .       MACRO: None.   Signed by: Alejandrina Waller 6/8/2024 4:05 AM Dictation workstation:   [id number]    CT angio chest for pulmonary embolism    Result Date: 6/8/2024  STUDY: CT Angiogram of the Chest; 6/7/2024 11:56 PM. INDICATION: Tachycardia.  Elevated d-dimer.  Evaluate for pulmonary embolism. COMPARISON: CXR 6/7/2024. ACCESSION NUMBER(S): [id number] ORDERING CLINICIAN: MEGAN WHITE TECHNIQUE:  CTA of the chest was performed with intravenous contrast. Images are reviewed and processed at a workstation according to the CT angiogram protocol with 3-D and/or MIP post processing imaging generated.  Omnipaque 350 90 mL was administered intravenously. Automated mA/kV exposure control was utilized and patient examination was performed in strict accordance with principles of ALARA. FINDINGS: Examination is significantly degraded by respiratory motion.  As such, there is suboptimal evaluation of the segmental and subsegmental pulmonary arterial branches.  No large central pulmonary embolus is otherwise identified.  The thoracic aorta is normal in course and caliber without dissection or aneurysm. The heart is normal in size without pericardial effusion.  Thoracic lymph nodes are not enlarged. There is no pleural effusion, pleural thickening, or pneumothorax. The airways are patent. Minimal bibasilar atelectasis.  Lungs otherwise clear without distinct consolidation or evidence for pulmonary  edema. Upper abdomen demonstrates no acute pathology. There are no acute fractures.  No suspicious bony lesions.  Thoracal lumbar scoliosis again demonstrated with postoperative changes of thoracolumbar fixation with Auguste rods in place.    Suboptimal evaluation of the segmental/subsegmental pulmonary arterial branches due to extensive respiratory motion.  No central pulmonary embolus otherwise identified. No distinct acute intrathoracic process otherwise seen. Signed by Dwayne Weir MD    XR chest 1 view    Result Date: 6/7/2024  STUDY: Chest Radiograph;  06/07/2024 at 9:27 PM INDICATION: Recent diagnosis of pneumonia, presents with increasing shortness of breath. COMPARISON: XR chest 05/03/24, 12/06/23, 06/30/19. ACCESSION NUMBER(S): [id number] ORDERING CLINICIAN: MEGAN WHITE TECHNIQUE:  Frontal chest was obtained at 2127 hours. FINDINGS: CARDIOMEDIASTINAL SILHOUETTE: Cardiomediastinal silhouette is normal in size and configuration.  LUNGS: Lungs are clear. There is no pneumothorax.  ABDOMEN: No remarkable upper abdominal findings.  Posterior spinal rods extending throughout the thoracic spine.    No detectable active cardiopulmonary disease. Signed by Jonathan Camargo MD    Assessment/Plan   This patient currently has cardiac telemetry ordered; if you would like to modify or discontinue the telemetry order, click here to go to the orders activity to modify/discontinue the order.    #1/acute exacerbation of asthmatic bronchitis/cough/bilateral wheezing/clinically slowly improving.  Chest x-ray on admission was negative for any infiltrates and CT of the chest was negative for any acute PEs.  Patient is clinically improving and remains on IV steroids and aerosol treatments.  Patient is being followed by pulmonary team also.  Patient remains on 1 L of nasal cannula oxygen.    #2/Concern for aspiration and pooling of oropharyngeal secretions and swallowing difficulties/enlarged adenoids/patient has been seen  by GI service and I appreciate the recommendations.  Patient has also been seen by speech therapist and her diet has been modified.  Follow aspiration precautions.  Patient has a modified barium swallow done as per GI in the recent past and no aspiration was detected.  EGD 1/27/2023 had nonobstructing Schatzki's ring and 1 cm hiatal hernia and gastric diverticulum and patient had a scar in the gastric body suggestive of prior PEG tube.    GI has recommended continuing Protonix 40 mg p.o. daily and consider repeat EGD.  3./Enlarged adenoids/ENT consultation was obtained yesterday and will await ENT recommendation.  4./Constipation/patient to continue MiraLAX 1 scoop twice daily and stool softeners have been added.  5./History of cerebral palsy and quadriplegia/generalized anxiety disorder and schizoaffective disorder and patient has been resumed on her regular meds.  6./Atypical chest pain yesterday and EKG was normal sinus rhythm and troponins were negative.  Patient has been seen by cardiology team and recommended no further testing at present time.  Chest pain has resolved and most likely secondary to her respiratory issues.  Medications reconciled.  Total time spent 30 minutes/time spent on patient interaction/assessment and plan and documentation and case discussed with the nursing on the floor.  Principal Problem:    Shortness of breath                  Charles Mead MD
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
## 2024-06-11 NOTE — PROGRESS NOTES
Speech-Language Pathology    SLP Adult Inpatient  Speech-Language Pathology Treatment     Patient Name: Anisha Quevedo  MRN: [id number]  Today's Date: 6/11/2024  Time Calculation  Start Time: 0825  Stop Time: 0845  Time Calculation (min): 20 min     Current Problem:   1. Shortness of breath        2. Moderate asthma, unspecified whether complicated, unspecified whether persistent (HHS-HCC)          Therapeutic Swallow:  Therapeutic Swallow Intervention : PO Trials, Compensatory Strategies, Caregiver Education  Solid Diet Recommendations: Minced & moist/ground (IDDSI Level 5)  Liquid Diet Recommendations: Thin (IDDSI Level 0)  Swallow Comments:  (baseline diet at group home per caregiver during yesterday's assessment.)    SLP Assessment:  SLP TX Intervention Outcome: Making Progress Towards Goals  Prognosis: Good  Treatment Provided: Yes, please hold PO intake/meals if pt is too SOB to safely coordinate breathing/swallowing. I do not think pt is aspirating given CXR and x2 MBSS in past several months.       -Pt admit with acute on chronic asthma exacerbation with referral to ENT d/t CT neck demonstrating a finding of a mildly enlarged adenoids with mild narrowing of the nasopharynx. Per pulmonology, this could be the cause of her presentation.     -MBSS of Feb 2024 revealed mild oral dysphagia d/t difficulty chewing and lingual pumping. Functional pharyngeal swallow, no aspiration.   -Pt recently seen at [facility] for an MBSs, and downgraded to minced/moist, similar to what group home was prepping for this patient, d/t oral dysphagia, no aspiration. Pt is eating very finely diced foods at group home and doing well per caregiver [employer] via phone conversation.      --Skilled meal analysis today. Pt consumes 100% of breakfast.  No oral residue or oral pocketing. Oral clearance is achieved with minced/moist in a timely manner.  No overt s/s aspiration with current diet textures throughout meal, no vomit. Suspect this is now  pt's least restrictive diet given observations today and discussion with caregiver who also feels this diet is best, given several diet textures tried in the home. Pt is demonstrating good appetite with no vomiting, ease of oral phase given the soft nature of the recommended diet.  -PO trials of soft/bite sized solids. Lengthy mastication with extra time for oral bolus formation and bolus manipulation, extra time for bolus propulsion to the pharynx.    -This SLP spoke with caregiver from home, she reports she feels that minced.moist is easiest for Anisha to safely orally prep and swallow.  Possible coughing and vomit that they were seeing at home ?may be d/t incomplete mastication  of solids.  However, GI work up remains a rec.     -ST to sign off, please consult if any further concern for safe swallow or progression of chronic oral dysphagia.     Treatment Tolerance: Patient tolerated treatment well  Medical Staff Made Aware: Yes  Strengths: Motivation  Barriers: Comorbidities  Education Provided: Yes (phone conversation with caregiver from group home. waiting to hear back from SLP at [facility] with info from that visit. reviewed most recent MBSS results from [facility].)    Dysphagia Goals: start date 6/10/24:  Patient will tolerate recommended diet without observed clinical signs of aspiration.  6/11 GOAL MET  Caregiver will demonstrate appropriate strategies for swallowing safety. 6/11 GOAL MET, PCA feeds pt at bedside using safety strategies as posted at head of bed.    Advanced solid trials with SLP as deemed appropriate to determine safest LRD. 6/11 DC GOAL, PT LRD IS MINCED/MOIST.   Patient/caregiver education. 6/11 GOAL MET WITH PT, CAREGIVER via phone conversation.       Plan:  SLP TX Plan: Discharge from Speech Therapy (pt is on least restrictive diet.)  SLP Discharge Recommendations:  (total feed assist ongoing in home on a modified oral diet.)  Discussed POC: Patient, Caregiver/family  Discussed Risks/Benefits:  Yes, Patient, Caregiver/Family (via phone call)  Patient/Caregiver Agreeable: Yes  SLP - OK to Discharge: Yes (pt is on a safe oral diet for discharge/baseline diet.)    Subjective   Pt seen bedside, this SLP completes skilled meal analysis, PCA was feeding pt prior to SLP arrival, using safe swallow strategies. Pt is fully upright, pleasant, cooperative, able to voice wants/needs.     CT Angio Chest 6/7  IMPRESSION:  Suboptimal evaluation of the segmental/subsegmental pulmonary arterial  branches due to extensive respiratory motion.  No central pulmonary  embolus otherwise identified.  No distinct acute intrathoracic process otherwise seen.    General Visit Information:   Caregiver Feedback: doing well with meals/meds, good appetite, no vomit  Prior to Session Communication: PCT/NA/CTA (feeding patient.)  Pain Assessment:   Pain Assessment: 0-10  Pain Score: 0 - No pain    Inpatient:  Education Documentation  SLP has provided pt and caregiver Uatsdin with the results and recommendations of this session.

## 2024-06-11 NOTE — CARE PLAN
The patient's goals for the shift include      The clinical goals for the shift include maintain safety      Problem: Skin  Goal: Participates in plan/prevention/treatment measures  Outcome: Progressing  Flowsheets (Taken 6/11/2024 0927)  Participates in plan/prevention/treatment measures: Elevate heels  Goal: Prevent/manage excess moisture  Outcome: Progressing  Flowsheets (Taken 6/11/2024 0927)  Prevent/manage excess moisture:   Moisturize dry skin   Cleanse incontinence/protect with barrier cream  Goal: Prevent/minimize sheer/friction injuries  Outcome: Progressing  Flowsheets (Taken 6/11/2024 0927)  Prevent/minimize sheer/friction injuries:   Use pull sheet   Turn/reposition every 2 hours/use positioning/transfer devices  Goal: Promote/optimize nutrition  Outcome: Progressing  Flowsheets (Taken 6/11/2024 0927)  Promote/optimize nutrition:   Consume > 50% meals/supplements   Assist with feeding     Problem: Respiratory  Goal: Minimal/no exertional discomfort or dyspnea this shift  Outcome: Progressing  Goal: No signs of respiratory distress (eg. Use of accessory muscles. Peds grunting)  Outcome: Progressing  Goal: Patent airway maintained this shift  Outcome: Progressing  Goal: Verbalize decreased shortness of breath this shift  Outcome: Progressing
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
## 2024-06-11 NOTE — PROGRESS NOTES
Anisha Quevedo is a 28 y.o. female on day 2 of admission presenting with Shortness of breath.      Subjective   Patient seen and examined by me.  Patient is resting in bed and she has upper airway secretions and some gurgling noises and has bilateral wheezing.  She is receiving aerosol treatments also.  Patient remains on IV Solu-Medrol and IV antibiotics since admission.  Nursing earlier were concerned when she complained of chest pain an EKG was done which is normal sinus rhythm and troponin is negative.  Cardiology has been consulted and appreciate cardiology input.  Patient was also seen by the speech therapist and patient was downgraded to diet to minced/moist solids due to concern for aspiration.  Patient also states that she has not had a bowel movement in 3 days.  She remains on as needed MiraLAX and Colace will be added.  Patient does not appear to be in acute respiratory distress and blood pressure is stable and patient is afebrile.  Patient remains on 2 L nasal cannula oxygen and is pulse oxing 95%.       Objective     Last Recorded Vitals  /71   Pulse 88   Temp 36.6 °C (97.9 °F) (Temporal)   Resp 20   Wt 77.1 kg (170 lb)   SpO2 95%   Intake/Output last 3 Shifts:    Intake/Output Summary (Last 24 hours) at 6/10/2024 2128  Last data filed at 6/10/2024 1930  Gross per 24 hour   Intake 980 ml   Output 2450 ml   Net -1470 ml       Admission Weight  Weight: 77.1 kg (170 lb) (06/07/24 2035)    Daily Weight  06/07/24 : 77.1 kg (170 lb)    Image Results  ECG 12 lead  Normal sinus rhythm  Left ventricular hypertrophy with QRS widening and repolarization abnormality ( R in aVL , William product )  Abnormal ECG  ECG 12 lead  Sinus tachycardia  Nonspecific T wave abnormality  Abnormal ECG  When compared with ECG of 06-DEC-2023 15:08,  No significant change was found      Physical Exam  Vitals and nursing note reviewed.   Constitutional:       General: She is not in acute distress.     Appearance: Normal  appearance. She is normal weight. She is not ill-appearing or toxic-appearing.   HENT:      Head: Normocephalic and atraumatic.      Right Ear: Tympanic membrane and ear canal normal. There is no impacted cerumen.      Left Ear: Tympanic membrane, ear canal and external ear normal.      Nose: Nose normal. No congestion or rhinorrhea.      Mouth/Throat:      Pharynx: Oropharynx is clear. No oropharyngeal exudate or posterior oropharyngeal erythema.   Eyes:      General: No scleral icterus.        Right eye: No discharge.         Left eye: No discharge.      Extraocular Movements: Extraocular movements intact.      Conjunctiva/sclera: Conjunctivae normal.      Pupils: Pupils are equal, round, and reactive to light.   Neck:      Vascular: No carotid bruit.   Cardiovascular:      Rate and Rhythm: Normal rate and regular rhythm.      Pulses: Normal pulses.      Heart sounds: Normal heart sounds. No murmur heard.     No gallop.   Pulmonary:      Effort: Pulmonary effort is normal. No respiratory distress.      Breath sounds: Wheezing present. No rhonchi or rales.      Comments: Lung exam is difficult/positive for bilateral wheezing and decreased breath sounds at the bases/  Abdominal:      General: Abdomen is flat. Bowel sounds are normal. There is no distension.      Palpations: Abdomen is soft. There is no mass.      Tenderness: There is no abdominal tenderness. There is no right CVA tenderness, left CVA tenderness, guarding or rebound.      Hernia: No hernia is present.   Musculoskeletal:         General: No swelling or tenderness. Normal range of motion.      Cervical back: Normal range of motion and neck supple. No rigidity.      Right lower leg: No edema.      Left lower leg: No edema.   Lymphadenopathy:      Cervical: No cervical adenopathy.   Skin:     General: Skin is warm.      Coloration: Skin is not jaundiced.      Findings: No erythema or rash.   Neurological:      General: No focal deficit present.       Mental Status: She is alert and oriented to person, place, and time. Mental status is at baseline.      Sensory: No sensory deficit.      Motor: No weakness.      Gait: Gait normal.      Deep Tendon Reflexes: Reflexes normal.   Psychiatric:         Mood and Affect: Mood normal.         Thought Content: Thought content normal.         Judgment: Judgment normal.         Relevant Results    Current Facility-Administered Medications:     ampicillin-sulbactam (Unasyn) in sodium chloride 0.9 % 100 mL 3 g, 3 g, intravenous, q6h, Lorraine Moraes DO, Stopped at 06/10/24 1842    benzonatate (Tessalon) capsule 100 mg, 100 mg, oral, TID PRN, Charles Mead MD, 100 mg at 06/10/24 0842    docusate sodium (Colace) capsule 100 mg, 100 mg, oral, BID PRN, Charles Mead MD    enoxaparin (Lovenox) syringe 40 mg, 40 mg, subcutaneous, q24h, Charles Mead MD, 40 mg at 06/10/24 1305    fluticasone (Flonase) nasal spray 2 spray, 2 spray, Each Nostril, Daily, Lorraine Moraes DO, 2 spray at 06/10/24 0844    fluticasone furoate-vilanteroL (Breo Ellipta) 100-25 mcg/dose inhaler 1 puff, 1 puff, inhalation, Daily, Lorraine Moraes DO, 1 puff at 06/10/24 0706    hydrOXYzine HCL (Atarax) tablet 50 mg, 50 mg, oral, Nightly, Lorraine Moraes DO, 50 mg at 06/10/24 2016    ipratropium-albuteroL (Duo-Neb) 0.5-2.5 mg/3 mL nebulizer solution 3 mL, 3 mL, nebulization, q2h PRN, Jaime Velazquez MD, 3 mL at 06/10/24 0933    ipratropium-albuteroL (Duo-Neb) 0.5-2.5 mg/3 mL nebulizer solution 3 mL, 3 mL, nebulization, q4h, Charles Mead MD, 3 mL at 06/10/24 1916    lidocaine 4 % patch 1 patch, 1 patch, transdermal, Daily, Aric Pearce MD PhD, 1 patch at 06/10/24 1306    lubricating eye drops ophthalmic solution 1 drop, 1 drop, Both Eyes, q8h PRN, Charles Mead MD    melatonin tablet 5 mg, 5 mg, oral, Nightly, Sohitesh Moraes DO, 5 mg at 06/10/24 2031    methylPREDNISolone sod succinate (SOLU-Medrol) 40 mg/mL injection 40 mg, 40 mg, intravenous, q8h, Lorraine  Moraes, DO, 40 mg at 06/10/24 2016    metoprolol tartrate (Lopressor) tablet 12.5 mg, 12.5 mg, oral, BID, Sohaib Moraes, DO, 12.5 mg at 06/10/24 2030    montelukast (Singulair) tablet 10 mg, 10 mg, oral, Nightly, Sohaib Moraes, DO, 10 mg at 06/10/24 2016    oxygen (O2) therapy, , inhalation, Continuous PRN - O2/gases, Charles Mead MD, 2 L/min at 06/10/24 0704    pantoprazole (ProtoNix) EC tablet 40 mg, 40 mg, oral, Daily before breakfast, Sohaib Moraes, DO, 40 mg at 06/10/24 0530    polyethylene glycol (Glycolax, Miralax) packet 17 g, 17 g, oral, Daily PRN, Sohaib Moraes, DO    racepinephrine (Asthmanefrin) 2.25 % nebulizer solution 0.5 mL, 0.5 mL, nebulization, q3h PRN, Jaime Velazquez MD, 0.5 mL at 06/09/24 0946    risperiDONE (RisperDAL) tablet 0.5 mg, 0.5 mg, oral, BID, Sohaib Moraes, DO, 0.5 mg at 06/10/24 1309    risperiDONE (RisperDAL) tablet 3.5 mg, 3.5 mg, oral, Nightly, Sohaib Moraes, DO, 3.5 mg at 06/10/24 2016   Results for orders placed or performed during the hospital encounter of 06/07/24 (from the past 24 hour(s))   Troponin I, High Sensitivity   Result Value Ref Range    Troponin I, High Sensitivity <3 0 - 13 ng/L   Lavender Top   Result Value Ref Range    Extra Tube Hold for add-ons.    SST TOP   Result Value Ref Range    Extra Tube Hold for add-ons.     ECG 12 lead    Result Date: 6/10/2024  Normal sinus rhythm Left ventricular hypertrophy with QRS widening and repolarization abnormality ( R in aVL , William product ) Abnormal ECG    ECG 12 lead    Result Date: 6/10/2024  Sinus tachycardia Nonspecific T wave abnormality Abnormal ECG When compared with ECG of 06-DEC-2023 15:08, No significant change was found    CT soft tissue neck wo IV contrast    Result Date: 6/8/2024  Interpreted By:  Alejandrina Waller, STUDY: CT SOFT TISSUE NECK WO IV CONTRAST;  6/8/2024 3:42 am   INDICATION: Signs/Symptoms:stridor, eval for upper airway obstruction.   COMPARISON: CT head 06/20/2019   ACCESSION NUMBER(S):  [id number]   ORDERING CLINICIAN: MEGAN WHITE   TECHNIQUE: Axial CT images of the neck obtained with no contrast administration. Coronal and sagittal reformats were performed.   FINDINGS:   There is motion artifact.   MUCOSAL SPACES AND CERVICAL SOFT TISSUES: There are mildly enlarged adenoids with mild narrowing of the nasopharynx. No obvious fluid collection.   CERVICAL LYMPH NODES: There are multiple bilateral mildly prominent cervical lymph nodes, not pathologically enlarged by CT criteria.   VISUALIZED INTRACRANIAL STRUCTURES AND ORBITS: Unremarkable.   CENTRAL AIRWAY AND LUNG APICES: There is mild narrowing of nasopharynx. Otherwise, the central airway is patent. The lung apices are clear.   THYROID GLAND: Unremarkable unenhanced appearance.   PAROTID AND SUBMANDIBULAR GLANDS: No adjacent inflammatory changes. No sialolithiasis.   PARANASAL SINUSES: Small amount of mucus at the bilateral sphenoid sinuses. There is mild mucosal thickening at the left maxillary sinus.   OSSEOUS STRUCTURES: Multilevel degenerative changes of the cervical spine with reversal of the cervical lordosis. Partially visualized orthopedic hardware at the upper thoracic spine.       1.  Mildly enlarged adenoids with mild narrowing of the nasopharynx; please correlate for acute adenoiditis. Otherwise, the central airway is patent .       MACRO: None.   Signed by: Alejandrina Waller 6/8/2024 4:05 AM Dictation workstation:   [id number]    CT angio chest for pulmonary embolism    Result Date: 6/8/2024  STUDY: CT Angiogram of the Chest; 6/7/2024 11:56 PM. INDICATION: Tachycardia.  Elevated d-dimer.  Evaluate for pulmonary embolism. COMPARISON: CXR 6/7/2024. ACCESSION NUMBER(S): [id number] ORDERING CLINICIAN: MEGAN WHITE TECHNIQUE:  CTA of the chest was performed with intravenous contrast. Images are reviewed and processed at a workstation according to the CT angiogram protocol with 3-D and/or MIP post processing imaging generated.   Omnipaque 350 90 mL was administered intravenously. Automated mA/kV exposure control was utilized and patient examination was performed in strict accordance with principles of ALARA. FINDINGS: Examination is significantly degraded by respiratory motion.  As such, there is suboptimal evaluation of the segmental and subsegmental pulmonary arterial branches.  No large central pulmonary embolus is otherwise identified.  The thoracic aorta is normal in course and caliber without dissection or aneurysm. The heart is normal in size without pericardial effusion.  Thoracic lymph nodes are not enlarged. There is no pleural effusion, pleural thickening, or pneumothorax. The airways are patent. Minimal bibasilar atelectasis.  Lungs otherwise clear without distinct consolidation or evidence for pulmonary edema. Upper abdomen demonstrates no acute pathology. There are no acute fractures.  No suspicious bony lesions.  Thoracal lumbar scoliosis again demonstrated with postoperative changes of thoracolumbar fixation with Auguste rods in place.    Suboptimal evaluation of the segmental/subsegmental pulmonary arterial branches due to extensive respiratory motion.  No central pulmonary embolus otherwise identified. No distinct acute intrathoracic process otherwise seen. Signed by Dwayne Weir MD    XR chest 1 view    Result Date: 6/7/2024  STUDY: Chest Radiograph;  06/07/2024 at 9:27 PM INDICATION: Recent diagnosis of pneumonia, presents with increasing shortness of breath. COMPARISON: XR chest 05/03/24, 12/06/23, 06/30/19. ACCESSION NUMBER(S): [id number] ORDERING CLINICIAN: MEGAN WHITE TECHNIQUE:  Frontal chest was obtained at 2127 hours. FINDINGS: CARDIOMEDIASTINAL SILHOUETTE: Cardiomediastinal silhouette is normal in size and configuration.  LUNGS: Lungs are clear. There is no pneumothorax.  ABDOMEN: No remarkable upper abdominal findings.  Posterior spinal rods extending throughout the thoracic spine.    No detectable  active cardiopulmonary disease. Signed by Jonathan Camargo MD           Assessment/Plan   This patient currently has cardiac telemetry ordered; if you would like to modify or discontinue the telemetry order, click here to go to the orders activity to modify/discontinue the order.  #1/Dyspnea/wheezing on admission/tachypnea and tachycardia/acute exacerbation of asthmatic bronchitis/CT of the chest on admission showed no consolidation or congestion but was a suboptimal study patient remains on IV Unasyn and IV steroids and aerosol treatments and supplemental oxygen 2 L of nasal cannula.  Patient continues to have ongoing wheezing and pulmonary consultation will be obtained.  Patient remains on Singulair 10 mg p.o. daily.  Patient has been seen seen by speech therapy and has had a swallowing eval as see above and concern for aspiration pneumonia.  Her CT initially showed mildly enlarged adenoids with mild narrowing of the native pharynx and ENT consultation was be obtained.  Aspiration precautions will be ordered and her diet has been downgraded to puréed food and moist liquids.  Patient will most likely require further testing with esophagogram and GI consultation and GI will be consulted.  #2/atypical chest pain this a.m./troponin is negative EKG is normal sinus rhythm and no acute ST-T changes  And patient has been seen by cardiology and no further cardiac testing ordered.  Her last LDL in 2023 was 92 and HDL was 75 and also as per cardiology had an echocardiogram in 2021 with normal LV function and no valvular defects.  3./History of cerebral palsy/quadriplegia/schizoaffective disorder and anxiety disorder and patient has been resumed on her psych medications.  #4/constipation patient remains on MiraLAX 1 scoop daily as needed and also will be started on Colace 100 mg p.o. twice daily as needed.  Encourage p.o. fluids and IV fluids will be discontinued.  Discharge planning patient will be discharged back to her group  home when ready.  Patient is being seen by  and  for discharge planning also.  Continue present level of care as above.  Total time spent 35 minutes/time spent on patient interaction/assessment and plan and documentation.    Principal Problem:    Shortness of breath                  Charles Mead MD

## 2024-06-12 ENCOUNTER — APPOINTMENT (OUTPATIENT)
Dept: RADIOLOGY | Facility: HOSPITAL | Age: 29
DRG: 202 | End: 2024-06-12
Payer: MEDICARE

## 2024-06-12 LAB
ALBUMIN SERPL BCP-MCNC: 3.5 G/DL (ref 3.4–5)
ALP SERPL-CCNC: 48 U/L (ref 33–110)
ALT SERPL W P-5'-P-CCNC: 10 U/L (ref 7–45)
ANION GAP SERPL CALC-SCNC: 12 MMOL/L (ref 10–20)
AST SERPL W P-5'-P-CCNC: 8 U/L (ref 9–39)
ATRIAL RATE: 100 BPM
ATRIAL RATE: 64 BPM
BACTERIA BLD CULT: NORMAL
BACTERIA BLD CULT: NORMAL
BILIRUB SERPL-MCNC: 0.3 MG/DL (ref 0–1.2)
BUN SERPL-MCNC: 12 MG/DL (ref 6–23)
CALCIUM SERPL-MCNC: 9.3 MG/DL (ref 8.6–10.3)
CHLORIDE SERPL-SCNC: 99 MMOL/L (ref 98–107)
CO2 SERPL-SCNC: 30 MMOL/L (ref 21–32)
CREAT SERPL-MCNC: 0.51 MG/DL (ref 0.5–1.05)
EGFRCR SERPLBLD CKD-EPI 2021: >90 ML/MIN/1.73M*2
ERYTHROCYTE [DISTWIDTH] IN BLOOD BY AUTOMATED COUNT: 13.3 % (ref 11.5–14.5)
GLUCOSE SERPL-MCNC: 121 MG/DL (ref 74–99)
HCT VFR BLD AUTO: 40.6 % (ref 36–46)
HGB BLD-MCNC: 13.4 G/DL (ref 12–16)
MCH RBC QN AUTO: 30 PG (ref 26–34)
MCHC RBC AUTO-ENTMCNC: 33 G/DL (ref 32–36)
MCV RBC AUTO: 91 FL (ref 80–100)
NRBC BLD-RTO: 0 /100 WBCS (ref 0–0)
P AXIS: 25 DEGREES
P AXIS: 45 DEGREES
P OFFSET: 194 MS
P OFFSET: 197 MS
P ONSET: 152 MS
P ONSET: 154 MS
PLATELET # BLD AUTO: 298 X10*3/UL (ref 150–450)
POTASSIUM SERPL-SCNC: 4.5 MMOL/L (ref 3.5–5.3)
PR INTERVAL: 136 MS
PR INTERVAL: 140 MS
PROT SERPL-MCNC: 6.5 G/DL (ref 6.4–8.2)
Q ONSET: 220 MS
Q ONSET: 224 MS
QRS COUNT: 10 BEATS
QRS COUNT: 17 BEATS
QRS DURATION: 66 MS
QRS DURATION: 86 MS
QT INTERVAL: 306 MS
QT INTERVAL: 384 MS
QTC CALCULATION(BAZETT): 394 MS
QTC CALCULATION(BAZETT): 396 MS
QTC FREDERICIA: 363 MS
QTC FREDERICIA: 392 MS
R AXIS: 5 DEGREES
R AXIS: 9 DEGREES
RBC # BLD AUTO: 4.47 X10*6/UL (ref 4–5.2)
SODIUM SERPL-SCNC: 136 MMOL/L (ref 136–145)
T AXIS: 15 DEGREES
T AXIS: 24 DEGREES
T OFFSET: 377 MS
T OFFSET: 412 MS
VENTRICULAR RATE: 100 BPM
VENTRICULAR RATE: 64 BPM
WBC # BLD AUTO: 10.1 X10*3/UL (ref 4.4–11.3)

## 2024-06-12 PROCEDURE — 80053 COMPREHEN METABOLIC PANEL: CPT | Performed by: INTERNAL MEDICINE

## 2024-06-12 PROCEDURE — 2500000005 HC RX 250 GENERAL PHARMACY W/O HCPCS: Performed by: INTERNAL MEDICINE

## 2024-06-12 PROCEDURE — 1200000002 HC GENERAL ROOM WITH TELEMETRY DAILY

## 2024-06-12 PROCEDURE — 2500000004 HC RX 250 GENERAL PHARMACY W/ HCPCS (ALT 636 FOR OP/ED): Performed by: NURSE PRACTITIONER

## 2024-06-12 PROCEDURE — 2500000005 HC RX 250 GENERAL PHARMACY W/O HCPCS: Performed by: STUDENT IN AN ORGANIZED HEALTH CARE EDUCATION/TRAINING PROGRAM

## 2024-06-12 PROCEDURE — 2500000002 HC RX 250 W HCPCS SELF ADMINISTERED DRUGS (ALT 637 FOR MEDICARE OP, ALT 636 FOR OP/ED): Performed by: STUDENT IN AN ORGANIZED HEALTH CARE EDUCATION/TRAINING PROGRAM

## 2024-06-12 PROCEDURE — 71046 X-RAY EXAM CHEST 2 VIEWS: CPT | Performed by: STUDENT IN AN ORGANIZED HEALTH CARE EDUCATION/TRAINING PROGRAM

## 2024-06-12 PROCEDURE — 94640 AIRWAY INHALATION TREATMENT: CPT | Mod: MUE

## 2024-06-12 PROCEDURE — 2500000004 HC RX 250 GENERAL PHARMACY W/ HCPCS (ALT 636 FOR OP/ED): Performed by: INTERNAL MEDICINE

## 2024-06-12 PROCEDURE — 85027 COMPLETE CBC AUTOMATED: CPT | Performed by: INTERNAL MEDICINE

## 2024-06-12 PROCEDURE — 36415 COLL VENOUS BLD VENIPUNCTURE: CPT | Performed by: INTERNAL MEDICINE

## 2024-06-12 PROCEDURE — 2500000002 HC RX 250 W HCPCS SELF ADMINISTERED DRUGS (ALT 637 FOR MEDICARE OP, ALT 636 FOR OP/ED): Performed by: INTERNAL MEDICINE

## 2024-06-12 PROCEDURE — 2500000001 HC RX 250 WO HCPCS SELF ADMINISTERED DRUGS (ALT 637 FOR MEDICARE OP): Performed by: STUDENT IN AN ORGANIZED HEALTH CARE EDUCATION/TRAINING PROGRAM

## 2024-06-12 PROCEDURE — 2500000004 HC RX 250 GENERAL PHARMACY W/ HCPCS (ALT 636 FOR OP/ED): Performed by: STUDENT IN AN ORGANIZED HEALTH CARE EDUCATION/TRAINING PROGRAM

## 2024-06-12 PROCEDURE — 71046 X-RAY EXAM CHEST 2 VIEWS: CPT

## 2024-06-12 RX ORDER — MOMETASONE FUROATE AND FORMOTEROL FUMARATE DIHYDRATE 200; 5 UG/1; UG/1
2 AEROSOL RESPIRATORY (INHALATION)
Qty: 13 G | Refills: 3 | Status: SHIPPED | OUTPATIENT
Start: 2024-06-12

## 2024-06-12 RX ORDER — IPRATROPIUM BROMIDE AND ALBUTEROL SULFATE 2.5; .5 MG/3ML; MG/3ML
3 SOLUTION RESPIRATORY (INHALATION) EVERY 2 HOUR PRN
Status: DISCONTINUED | OUTPATIENT
Start: 2024-06-12 | End: 2024-06-18 | Stop reason: HOSPADM

## 2024-06-12 RX ADMIN — AMPICILLIN SODIUM AND SULBACTAM SODIUM 3 G: 2; 1 INJECTION, POWDER, FOR SOLUTION INTRAMUSCULAR; INTRAVENOUS at 12:15

## 2024-06-12 RX ADMIN — PANTOPRAZOLE SODIUM 40 MG: 40 TABLET, DELAYED RELEASE ORAL at 05:17

## 2024-06-12 RX ADMIN — IPRATROPIUM BROMIDE AND ALBUTEROL SULFATE 3 ML: .5; 3 SOLUTION RESPIRATORY (INHALATION) at 06:58

## 2024-06-12 RX ADMIN — LIDOCAINE 1 PATCH: 4 PATCH TOPICAL at 09:56

## 2024-06-12 RX ADMIN — IPRATROPIUM BROMIDE AND ALBUTEROL SULFATE 3 ML: .5; 3 SOLUTION RESPIRATORY (INHALATION) at 19:33

## 2024-06-12 RX ADMIN — AMPICILLIN SODIUM AND SULBACTAM SODIUM 3 G: 2; 1 INJECTION, POWDER, FOR SOLUTION INTRAMUSCULAR; INTRAVENOUS at 05:15

## 2024-06-12 RX ADMIN — FLUTICASONE FUROATE AND VILANTEROL TRIFENATATE 1 PUFF: 100; 25 POWDER RESPIRATORY (INHALATION) at 06:59

## 2024-06-12 RX ADMIN — RISPERIDONE 0.5 MG: 1 TABLET, FILM COATED ORAL at 09:59

## 2024-06-12 RX ADMIN — ENOXAPARIN SODIUM 40 MG: 40 INJECTION SUBCUTANEOUS at 12:14

## 2024-06-12 RX ADMIN — HYDROXYZINE HYDROCHLORIDE 50 MG: 25 TABLET ORAL at 21:00

## 2024-06-12 RX ADMIN — AMPICILLIN SODIUM AND SULBACTAM SODIUM 3 G: 2; 1 INJECTION, POWDER, FOR SOLUTION INTRAMUSCULAR; INTRAVENOUS at 00:07

## 2024-06-12 RX ADMIN — POLYETHYLENE GLYCOL 3350 17 G: 17 POWDER, FOR SOLUTION ORAL at 21:01

## 2024-06-12 RX ADMIN — Medication 5 MG: at 21:00

## 2024-06-12 RX ADMIN — AMPICILLIN SODIUM AND SULBACTAM SODIUM 3 G: 2; 1 INJECTION, POWDER, FOR SOLUTION INTRAMUSCULAR; INTRAVENOUS at 23:46

## 2024-06-12 RX ADMIN — METOPROLOL TARTRATE 12.5 MG: 25 TABLET, FILM COATED ORAL at 21:01

## 2024-06-12 RX ADMIN — METHYLPREDNISOLONE SODIUM SUCCINATE 40 MG: 40 INJECTION, POWDER, FOR SOLUTION INTRAMUSCULAR; INTRAVENOUS at 12:14

## 2024-06-12 RX ADMIN — IPRATROPIUM BROMIDE AND ALBUTEROL SULFATE 3 ML: .5; 3 SOLUTION RESPIRATORY (INHALATION) at 00:00

## 2024-06-12 RX ADMIN — METHYLPREDNISOLONE SODIUM SUCCINATE 40 MG: 40 INJECTION, POWDER, FOR SOLUTION INTRAMUSCULAR; INTRAVENOUS at 05:18

## 2024-06-12 RX ADMIN — MONTELUKAST 10 MG: 10 TABLET, FILM COATED ORAL at 21:00

## 2024-06-12 RX ADMIN — METHYLPREDNISOLONE SODIUM SUCCINATE 40 MG: 40 INJECTION, POWDER, FOR SOLUTION INTRAMUSCULAR; INTRAVENOUS at 18:45

## 2024-06-12 RX ADMIN — AMPICILLIN SODIUM AND SULBACTAM SODIUM 3 G: 2; 1 INJECTION, POWDER, FOR SOLUTION INTRAMUSCULAR; INTRAVENOUS at 18:44

## 2024-06-12 RX ADMIN — RISPERIDONE 0.5 MG: 1 TABLET, FILM COATED ORAL at 14:43

## 2024-06-12 RX ADMIN — POLYETHYLENE GLYCOL 3350 17 G: 17 POWDER, FOR SOLUTION ORAL at 10:01

## 2024-06-12 RX ADMIN — FLUTICASONE PROPIONATE 2 SPRAY: 50 SPRAY, METERED NASAL at 10:06

## 2024-06-12 RX ADMIN — POLYVINYL ALCOHOL, POVIDONE 1 DROP: 14; 6 SOLUTION/ DROPS OPHTHALMIC at 09:59

## 2024-06-12 RX ADMIN — METOPROLOL TARTRATE 12.5 MG: 25 TABLET, FILM COATED ORAL at 10:01

## 2024-06-12 RX ADMIN — RISPERIDONE 3.5 MG: 1 TABLET, FILM COATED ORAL at 21:00

## 2024-06-12 RX ADMIN — IPRATROPIUM BROMIDE AND ALBUTEROL SULFATE 3 ML: .5; 3 SOLUTION RESPIRATORY (INHALATION) at 13:37

## 2024-06-12 ASSESSMENT — PAIN - FUNCTIONAL ASSESSMENT
PAIN_FUNCTIONAL_ASSESSMENT: 0-10
PAIN_FUNCTIONAL_ASSESSMENT: 0-10

## 2024-06-12 ASSESSMENT — COGNITIVE AND FUNCTIONAL STATUS - GENERAL
WALKING IN HOSPITAL ROOM: TOTAL
PERSONAL GROOMING: TOTAL
HELP NEEDED FOR BATHING: TOTAL
DRESSING REGULAR LOWER BODY CLOTHING: TOTAL
TURNING FROM BACK TO SIDE WHILE IN FLAT BAD: TOTAL
MOVING TO AND FROM BED TO CHAIR: TOTAL
EATING MEALS: TOTAL
TOILETING: TOTAL
STANDING UP FROM CHAIR USING ARMS: TOTAL
DRESSING REGULAR UPPER BODY CLOTHING: TOTAL
MOVING FROM LYING ON BACK TO SITTING ON SIDE OF FLAT BED WITH BEDRAILS: TOTAL
CLIMB 3 TO 5 STEPS WITH RAILING: TOTAL
DAILY ACTIVITIY SCORE: 6
MOBILITY SCORE: 6

## 2024-06-12 ASSESSMENT — PAIN SCALES - GENERAL
PAINLEVEL_OUTOF10: 3
PAINLEVEL_OUTOF10: 0 - NO PAIN

## 2024-06-12 NOTE — PROGRESS NOTES
To go to be tested Anisha Quevedo is a 28 y.o. female on day 4 of admission presenting with Shortness of breath.      Subjective          Objective     Last Recorded Vitals  /66 (BP Location: Right arm, Patient Position: Lying)   Pulse 96   Temp 36.9 °C (98.4 °F) (Temporal)   Resp 16   Wt 77.1 kg (170 lb)   SpO2 93%   Intake/Output last 3 Shifts:    Intake/Output Summary (Last 24 hours) at 6/12/2024 1701  Last data filed at 6/12/2024 1245  Gross per 24 hour   Intake 800 ml   Output 1725 ml   Net -925 ml       Admission Weight  Weight: 77.1 kg (170 lb) (06/07/24 2035)    Daily Weight  06/07/24 : 77.1 kg (170 lb)    Image Results  Electrocardiogram, 12-lead PRN ACS symptoms  Normal sinus rhythm with sinus arrhythmia  Nonspecific ST abnormality  Abnormal ECG    Confirmed by Ramicone, James ([id number]) on 6/12/2024 5:12:59 AM  Electrocardiogram, 12-lead PRN ACS symptoms  Normal sinus rhythm  Nonspecific ST and T wave abnormality  Abnormal ECG  When compared with ECG of 10-CECI-2024 09:53, (unconfirmed)  No significant change was found  Confirmed by Ramicone, James ([id number]) on 6/12/2024 5:10:44 AM      Physical Exam  Vitals and nursing note reviewed.   Constitutional:       General: She is not in acute distress.     Appearance: Normal appearance. She is normal weight. She is not ill-appearing or toxic-appearing.   HENT:      Head: Normocephalic and atraumatic.      Right Ear: Tympanic membrane and ear canal normal. There is no impacted cerumen.      Left Ear: Tympanic membrane, ear canal and external ear normal.      Nose: Nose normal. No congestion or rhinorrhea.      Mouth/Throat:      Pharynx: Oropharynx is clear. No oropharyngeal exudate or posterior oropharyngeal erythema.   Eyes:      General: No scleral icterus.        Right eye: No discharge.         Left eye: No discharge.      Extraocular Movements: Extraocular movements intact.      Conjunctiva/sclera: Conjunctivae normal.      Pupils: Pupils are equal, round,  and reactive to light.   Neck:      Vascular: No carotid bruit.   Cardiovascular:      Rate and Rhythm: Normal rate and regular rhythm.      Pulses: Normal pulses.      Heart sounds: Normal heart sounds. No murmur heard.     No gallop.   Pulmonary:      Effort: Pulmonary effort is normal. No tachypnea, accessory muscle usage or respiratory distress.      Breath sounds: Wheezing present. No rhonchi or rales.      Comments: Lung exam/patient has bilateral few expiratory wheezing and improving/decreased breath sounds at the bases/  Abdominal:      General: Abdomen is flat. Bowel sounds are normal. There is no distension.      Palpations: Abdomen is soft. There is no mass.      Tenderness: There is no abdominal tenderness. There is no right CVA tenderness, left CVA tenderness, guarding or rebound.      Hernia: No hernia is present.   Musculoskeletal:         General: No swelling or tenderness. Normal range of motion.      Cervical back: Normal range of motion and neck supple. No rigidity.      Right lower leg: No edema.      Left lower leg: No edema.   Lymphadenopathy:      Cervical: No cervical adenopathy.   Skin:     General: Skin is warm.      Coloration: Skin is not jaundiced.      Findings: No erythema or rash.   Neurological:      General: No focal deficit present.      Mental Status: She is alert and oriented to person, place, and time. Mental status is at baseline.      Sensory: No sensory deficit.      Motor: No weakness.      Gait: Gait normal.      Deep Tendon Reflexes: Reflexes normal.   Psychiatric:         Mood and Affect: Mood normal.         Thought Content: Thought content normal.         Judgment: Judgment normal.         Relevant Results    Current Facility-Administered Medications:     ampicillin-sulbactam (Unasyn) in sodium chloride 0.9 % 100 mL 3 g, 3 g, intravenous, q6h, Lorraine Moraes DO, Stopped at 06/12/24 1245    benzonatate (Tessalon) capsule 100 mg, 100 mg, oral, TID PRN, Charles Mead MD,  100 mg at 06/10/24 0842    docusate sodium (Colace) capsule 100 mg, 100 mg, oral, BID PRN, Charles Mead MD    enoxaparin (Lovenox) syringe 40 mg, 40 mg, subcutaneous, q24h, Charles Mead MD, 40 mg at 06/12/24 1214    fluticasone (Flonase) nasal spray 2 spray, 2 spray, Each Nostril, Daily, Lorraine Moraes DO, 2 spray at 06/12/24 1006    fluticasone furoate-vilanteroL (Breo Ellipta) 100-25 mcg/dose inhaler 1 puff, 1 puff, inhalation, Daily, Lorraine Moraes DO, 1 puff at 06/12/24 0659    hydrOXYzine HCL (Atarax) tablet 50 mg, 50 mg, oral, Nightly, Lorraine Moraes DO, 50 mg at 06/11/24 2045    ipratropium-albuteroL (Duo-Neb) 0.5-2.5 mg/3 mL nebulizer solution 3 mL, 3 mL, nebulization, q6h, Charles Mead MD, 3 mL at 06/12/24 1337    ipratropium-albuteroL (Duo-Neb) 0.5-2.5 mg/3 mL nebulizer solution 3 mL, 3 mL, nebulization, q2h PRN, Charles Mead MD    lidocaine 4 % patch 1 patch, 1 patch, transdermal, Daily, Aric Pearce MD PhD, 1 patch at 06/12/24 0956    lubricating eye drops ophthalmic solution 1 drop, 1 drop, Both Eyes, q8h PRN, Charles Mead MD, 1 drop at 06/12/24 0959    melatonin tablet 5 mg, 5 mg, oral, Nightly, Lorraine Moraes DO, 5 mg at 06/11/24 2046    methylPREDNISolone sod succinate (SOLU-Medrol) 40 mg/mL injection 40 mg, 40 mg, intravenous, q8h, Lorraine Moraes DO, 40 mg at 06/12/24 1214    metoprolol tartrate (Lopressor) tablet 12.5 mg, 12.5 mg, oral, BID, Lorraine Moraes DO, 12.5 mg at 06/12/24 1001    montelukast (Singulair) tablet 10 mg, 10 mg, oral, Nightly, Lorraine Moraes DO, 10 mg at 06/11/24 2046    oxygen (O2) therapy, , inhalation, Continuous PRN - O2/gases, Charles Mead MD, 1 L/min at 06/11/24 0900    pantoprazole (ProtoNix) EC tablet 40 mg, 40 mg, oral, Daily before breakfast, Lorraine Moraes DO, 40 mg at 06/12/24 0517    polyethylene glycol (Glycolax, Miralax) packet 17 g, 17 g, oral, Daily PRN, Lorraine Moraes DO    polyethylene glycol (Glycolax, Miralax) powder 17 g, 17 g, oral, BID,  Mago Pfeiffer, APRN-CNP, 17 g at 06/12/24 1001    racepinephrine (Asthmanefrin) 2.25 % nebulizer solution 0.5 mL, 0.5 mL, nebulization, q3h PRN, Jaime Velazquez MD, 0.5 mL at 06/09/24 0946    risperiDONE (RisperDAL) tablet 0.5 mg, 0.5 mg, oral, BID, Sohaib Moraes, DO, 0.5 mg at 06/12/24 1443    risperiDONE (RisperDAL) tablet 3.5 mg, 3.5 mg, oral, Nightly, Sohaib Moraes, DO, 3.5 mg at 06/11/24 2045   Results for orders placed or performed during the hospital encounter of 06/07/24 (from the past 96 hour(s))   Basic Metabolic Panel   Result Value Ref Range    Glucose 119 (H) 74 - 99 mg/dL    Sodium 135 (L) 136 - 145 mmol/L    Potassium 4.4 3.5 - 5.3 mmol/L    Chloride 100 98 - 107 mmol/L    Bicarbonate 26 21 - 32 mmol/L    Anion Gap 13 10 - 20 mmol/L    Urea Nitrogen 9 6 - 23 mg/dL    Creatinine 0.39 (L) 0.50 - 1.05 mg/dL    eGFR >90 >60 mL/min/1.73m*2    Calcium 9.1 8.6 - 10.3 mg/dL   CBC   Result Value Ref Range    WBC 9.4 4.4 - 11.3 x10*3/uL    nRBC 0.0 0.0 - 0.0 /100 WBCs    RBC 4.33 4.00 - 5.20 x10*6/uL    Hemoglobin 13.1 12.0 - 16.0 g/dL    Hematocrit 39.3 36.0 - 46.0 %    MCV 91 80 - 100 fL    MCH 30.3 26.0 - 34.0 pg    MCHC 33.3 32.0 - 36.0 g/dL    RDW 13.4 11.5 - 14.5 %    Platelets 280 150 - 450 x10*3/uL   Urinalysis with Reflex Culture and Microscopic   Result Value Ref Range    Color, Urine Light-Yellow Light-Yellow, Yellow, Dark-Yellow    Appearance, Urine Clear Clear    Specific Gravity, Urine 1.026 1.005 - 1.035    pH, Urine 7.0 5.0, 5.5, 6.0, 6.5, 7.0, 7.5, 8.0    Protein, Urine 10 (TRACE) NEGATIVE, 10 (TRACE), 20 (TRACE) mg/dL    Glucose, Urine Normal Normal mg/dL    Blood, Urine NEGATIVE NEGATIVE    Ketones, Urine 20 (1+) (A) NEGATIVE mg/dL    Bilirubin, Urine NEGATIVE NEGATIVE    Urobilinogen, Urine Normal Normal mg/dL    Nitrite, Urine NEGATIVE NEGATIVE    Leukocyte Esterase, Urine NEGATIVE NEGATIVE   Extra Urine Gray Tube   Result Value Ref Range    Extra Tube Hold for add-ons.     Urinalysis Microscopic   Result Value Ref Range    WBC, Urine NONE 1-5, NONE /HPF    RBC, Urine 1-2 NONE, 1-2, 3-5 /HPF    Squamous Epithelial Cells, Urine 1-9 (SPARSE) Reference range not established. /HPF   Troponin I, High Sensitivity   Result Value Ref Range    Troponin I, High Sensitivity <3 0 - 13 ng/L   Lavender Top   Result Value Ref Range    Extra Tube Hold for add-ons.    SST TOP   Result Value Ref Range    Extra Tube Hold for add-ons.    Electrocardiogram, 12-lead PRN ACS symptoms   Result Value Ref Range    Ventricular Rate 64 BPM    Atrial Rate 64 BPM    IA Interval 136 ms    QRS Duration 86 ms    QT Interval 384 ms    QTC Calculation(Bazett) 396 ms    P Axis 25 degrees    R Axis 5 degrees    T Axis 15 degrees    QRS Count 10 beats    Q Onset 220 ms    P Onset 152 ms    P Offset 194 ms    T Offset 412 ms    QTC Fredericia 392 ms   Electrocardiogram, 12-lead PRN ACS symptoms   Result Value Ref Range    Ventricular Rate 100 BPM    Atrial Rate 100 BPM    IA Interval 140 ms    QRS Duration 66 ms    QT Interval 306 ms    QTC Calculation(Bazett) 394 ms    P Axis 45 degrees    R Axis 9 degrees    T Axis 24 degrees    QRS Count 17 beats    Q Onset 224 ms    P Onset 154 ms    P Offset 197 ms    T Offset 377 ms    QTC Fredericia 363 ms   CBC   Result Value Ref Range    WBC 10.1 4.4 - 11.3 x10*3/uL    nRBC 0.0 0.0 - 0.0 /100 WBCs    RBC 4.47 4.00 - 5.20 x10*6/uL    Hemoglobin 13.4 12.0 - 16.0 g/dL    Hematocrit 40.6 36.0 - 46.0 %    MCV 91 80 - 100 fL    MCH 30.0 26.0 - 34.0 pg    MCHC 33.0 32.0 - 36.0 g/dL    RDW 13.3 11.5 - 14.5 %    Platelets 298 150 - 450 x10*3/uL   Comprehensive Metabolic Panel   Result Value Ref Range    Glucose 121 (H) 74 - 99 mg/dL    Sodium 136 136 - 145 mmol/L    Potassium 4.5 3.5 - 5.3 mmol/L    Chloride 99 98 - 107 mmol/L    Bicarbonate 30 21 - 32 mmol/L    Anion Gap 12 10 - 20 mmol/L    Urea Nitrogen 12 6 - 23 mg/dL    Creatinine 0.51 0.50 - 1.05 mg/dL    eGFR >90 >60 mL/min/1.73m*2     Calcium 9.3 8.6 - 10.3 mg/dL    Albumin 3.5 3.4 - 5.0 g/dL    Alkaline Phosphatase 48 33 - 110 U/L    Total Protein 6.5 6.4 - 8.2 g/dL    AST 8 (L) 9 - 39 U/L    Bilirubin, Total 0.3 0.0 - 1.2 mg/dL    ALT 10 7 - 45 U/L      Results for orders placed or performed during the hospital encounter of 06/07/24 (from the past 24 hour(s))   CBC   Result Value Ref Range    WBC 10.1 4.4 - 11.3 x10*3/uL    nRBC 0.0 0.0 - 0.0 /100 WBCs    RBC 4.47 4.00 - 5.20 x10*6/uL    Hemoglobin 13.4 12.0 - 16.0 g/dL    Hematocrit 40.6 36.0 - 46.0 %    MCV 91 80 - 100 fL    MCH 30.0 26.0 - 34.0 pg    MCHC 33.0 32.0 - 36.0 g/dL    RDW 13.3 11.5 - 14.5 %    Platelets 298 150 - 450 x10*3/uL   Comprehensive Metabolic Panel   Result Value Ref Range    Glucose 121 (H) 74 - 99 mg/dL    Sodium 136 136 - 145 mmol/L    Potassium 4.5 3.5 - 5.3 mmol/L    Chloride 99 98 - 107 mmol/L    Bicarbonate 30 21 - 32 mmol/L    Anion Gap 12 10 - 20 mmol/L    Urea Nitrogen 12 6 - 23 mg/dL    Creatinine 0.51 0.50 - 1.05 mg/dL    eGFR >90 >60 mL/min/1.73m*2    Calcium 9.3 8.6 - 10.3 mg/dL    Albumin 3.5 3.4 - 5.0 g/dL    Alkaline Phosphatase 48 33 - 110 U/L    Total Protein 6.5 6.4 - 8.2 g/dL    AST 8 (L) 9 - 39 U/L    Bilirubin, Total 0.3 0.0 - 1.2 mg/dL    ALT 10 7 - 45 U/L         Electrocardiogram, 12-lead PRN ACS symptoms    Result Date: 6/11/2024  Normal sinus rhythm Nonspecific ST and T wave abnormality Abnormal ECG When compared with ECG of 10-CECI-2024 09:53, (unconfirmed) No significant change was found    Electrocardiogram, 12-lead PRN ACS symptoms    Result Date: 6/11/2024  Normal sinus rhythm with sinus arrhythmia Nonspecific ST abnormality Abnormal ECG When compared with ECG of 07-JUN-2024 20:59, (unconfirmed) QRS duration has decreased ST elevation now present in Inferior leads ST elevation now present in Lateral leads T wave inversion now evident in Inferior leads T wave inversion no longer evident in Lateral leads    ECG 12 lead    Result Date:  6/10/2024  Normal sinus rhythm Left ventricular hypertrophy with QRS widening and repolarization abnormality ( R in aVL , [place] product ) Abnormal ECG    ECG 12 lead    Result Date: 6/10/2024  Sinus tachycardia Nonspecific T wave abnormality Abnormal ECG When compared with ECG of 06-DEC-2023 15:08, No significant change was found    CT soft tissue neck wo IV contrast    Result Date: 6/8/2024  Interpreted By:  Alejandrina Waller, STUDY: CT SOFT TISSUE NECK WO IV CONTRAST;  6/8/2024 3:42 am   INDICATION: Signs/Symptoms:stridor, eval for upper airway obstruction.   COMPARISON: CT head 06/20/2019   ACCESSION NUMBER(S): [id number]   ORDERING CLINICIAN: MEGAN WHITE   TECHNIQUE: Axial CT images of the neck obtained with no contrast administration. Coronal and sagittal reformats were performed.   FINDINGS:   There is motion artifact.   MUCOSAL SPACES AND CERVICAL SOFT TISSUES: There are mildly enlarged adenoids with mild narrowing of the nasopharynx. No obvious fluid collection.   CERVICAL LYMPH NODES: There are multiple bilateral mildly prominent cervical lymph nodes, not pathologically enlarged by CT criteria.   VISUALIZED INTRACRANIAL STRUCTURES AND ORBITS: Unremarkable.   CENTRAL AIRWAY AND LUNG APICES: There is mild narrowing of nasopharynx. Otherwise, the central airway is patent. The lung apices are clear.   THYROID GLAND: Unremarkable unenhanced appearance.   PAROTID AND SUBMANDIBULAR GLANDS: No adjacent inflammatory changes. No sialolithiasis.   PARANASAL SINUSES: Small amount of mucus at the bilateral sphenoid sinuses. There is mild mucosal thickening at the left maxillary sinus.   OSSEOUS STRUCTURES: Multilevel degenerative changes of the cervical spine with reversal of the cervical lordosis. Partially visualized orthopedic hardware at the upper thoracic spine.       1.  Mildly enlarged adenoids with mild narrowing of the nasopharynx; please correlate for acute adenoiditis. Otherwise, the central airway is  patent .       MACRO: None.   Signed by: Alejandrina Waller 6/8/2024 4:05 AM Dictation workstation:   [id number]    CT angio chest for pulmonary embolism    Result Date: 6/8/2024  STUDY: CT Angiogram of the Chest; 6/7/2024 11:56 PM. INDICATION: Tachycardia.  Elevated d-dimer.  Evaluate for pulmonary embolism. COMPARISON: CXR 6/7/2024. ACCESSION NUMBER(S): [id number] ORDERING CLINICIAN: MEGAN WHITE TECHNIQUE:  CTA of the chest was performed with intravenous contrast. Images are reviewed and processed at a workstation according to the CT angiogram protocol with 3-D and/or MIP post processing imaging generated.  Omnipaque 350 90 mL was administered intravenously. Automated mA/kV exposure control was utilized and patient examination was performed in strict accordance with principles of ALARA. FINDINGS: Examination is significantly degraded by respiratory motion.  As such, there is suboptimal evaluation of the segmental and subsegmental pulmonary arterial branches.  No large central pulmonary embolus is otherwise identified.  The thoracic aorta is normal in course and caliber without dissection or aneurysm. The heart is normal in size without pericardial effusion.  Thoracic lymph nodes are not enlarged. There is no pleural effusion, pleural thickening, or pneumothorax. The airways are patent. Minimal bibasilar atelectasis.  Lungs otherwise clear without distinct consolidation or evidence for pulmonary edema. Upper abdomen demonstrates no acute pathology. There are no acute fractures.  No suspicious bony lesions.  Thoracal lumbar scoliosis again demonstrated with postoperative changes of thoracolumbar fixation with Auguste rods in place.    Suboptimal evaluation of the segmental/subsegmental pulmonary arterial branches due to extensive respiratory motion.  No central pulmonary embolus otherwise identified. No distinct acute intrathoracic process otherwise seen. Signed by Dwayne Weir MD    XR chest 1  view    Result Date: 6/7/2024  STUDY: Chest Radiograph;  06/07/2024 at 9:27 PM INDICATION: Recent diagnosis of pneumonia, presents with increasing shortness of breath. COMPARISON: XR chest 05/03/24, 12/06/23, 06/30/19. ACCESSION NUMBER(S): [id number] ORDERING CLINICIAN: MEGAN WHITE TECHNIQUE:  Frontal chest was obtained at 2127 hours. FINDINGS: CARDIOMEDIASTINAL SILHOUETTE: Cardiomediastinal silhouette is normal in size and configuration.  LUNGS: Lungs are clear. There is no pneumothorax.  ABDOMEN: No remarkable upper abdominal findings.  Posterior spinal rods extending throughout the thoracic spine.    No detectable active cardiopulmonary disease. Signed by Jonathan Camargo MD    Assessment/Plan   This patient currently has cardiac telemetry ordered; if you would like to modify or discontinue the telemetry order, click here to go to the orders activity to modify/discontinue the order.    #1/acute exacerbation of asthmatic bronchitis/cough/bilateral wheezing/clinically slowly improving.  Chest x-ray on admission was negative for any infiltrates and CT of the chest was negative for any acute PEs.  Patient is clinically improving and remains on IV steroids and aerosol treatments.  Patient is being followed by pulmonary team also.  Patient remains on 1 L of nasal cannula oxygen.    #2/Concern for aspiration and pooling of oropharyngeal secretions and swallowing difficulties/enlarged adenoids/patient has been seen by GI service and I appreciate the recommendations.  Patient has also been seen by speech therapist and her diet has been modified.  Follow aspiration precautions.  Patient has a modified barium swallow done as per GI in the recent past and no aspiration was detected.  EGD 1/27/2023 had nonobstructing Schatzki's ring and 1 cm hiatal hernia and gastric diverticulum and patient had a scar in the gastric body suggestive of prior PEG tube.    GI has recommended continuing Protonix 40 mg p.o. daily and consider  repeat EGD.  3./Enlarged adenoids/ENT consultation was obtained yesterday and will await ENT recommendation.  4./Constipation/patient to continue MiraLAX 1 scoop twice daily and stool softeners have been added.  5./History of cerebral palsy and quadriplegia/generalized anxiety disorder and schizoaffective disorder and patient has been resumed on her regular meds.  6./Atypical chest pain yesterday and EKG was normal sinus rhythm and troponins were negative.  Patient has been seen by cardiology team and recommended no further testing at present time.  Chest pain has resolved and most likely secondary to her respiratory issues.  Medications reconciled.  Total time spent 30 minutes/time spent on patient interaction/assessment and plan and documentation and case discussed with the nursing on the floor.  Principal Problem:    Shortness of breath    Patient fully evaluated on June 12.  Respiratory status is only slightly improved.  Still weaning oxygen down to 1 L nasal cannula.  Continue present IV antibiotics recheck labs in AM.  Repeat chest x-ray ordered.              Matt Zelaya MD

## 2024-06-12 NOTE — CARE PLAN
The patient's goals for the shift include  comfort    The clinical goals for the shift include maintain safety and hemodynamic stability

## 2024-06-12 NOTE — CARE PLAN
The patient's goals for the shift include      The clinical goals for the shift include maintain safety

## 2024-06-12 NOTE — PROGRESS NOTES
"Anisha Quevedo is a 28 y.o. female on day 4 of admission presenting with Shortness of breath.    Subjective   6/12/2024 uneventful night.  No vomiting events reported by bedside RN since yesterday or during current admission.  Patient denies any abdominal discomfort or nausea.  Tolerates minced/moist diet well.  Denies stool overnight but is on bedpan during exam.  Abdomen soft, nontender, bowel sounds present       Objective     A 10 point review of system is negative except for what is mentioned in the HPI       The note was created using voice recognition transcription software. Despite proofreading, unintentional typographical errors may be present. Please contact the GI office with any questions or concerns.      Current Medications: reviewed     Vital Signs: Reviewed     Physical Exam:  General: no apparent distress, pleasant and cooperative  Skin:  Warm and dry, no jaundice  HEENT: No scleral icterus, no conjunctival pallor, normocephalic, atraumatic, mucous membranes moist  Neck:  atraumatic, trachea midline, no JVD  Chest:  decreased air entry to auscultation bilaterally. No wheezes, rales, or rhonchi  CV:  Regular rate and rhythm.  Positive S1/S2  Abdomen: no distension, +BS, soft, non-tender to palpation, no rebound tenderness, no guarding, no rigidity, no discernible ascites.  Few small well-healed old surgical scars on abdominal wall  Extremities: no lower extremity edema, Chronic pigmentary changes, no cyanosis  Neurological:  A&Ox3 , no asterixis  Psychiatric: cooperative      Investigations:  Labs, radiological imaging and cardiac work up were reviewed    Last Recorded Vitals  Blood pressure 115/59, pulse 92, temperature 36.6 °C (97.9 °F), temperature source Temporal, resp. rate 16, height 1.473 m (4' 10\"), weight 77.1 kg (170 lb), SpO2 90%.  Intake/Output last 3 Shifts:  I/O last 3 completed shifts:  In: 1150 (14.9 mL/kg) [P.O.:1150]  Out: 3800 (49.3 mL/kg) [Urine:3800 (1.4 mL/kg/hr)]  Weight: 77.1 kg "     Relevant Results      Scheduled medications  ampicillin-sulbactam, 3 g, intravenous, q6h  enoxaparin, 40 mg, subcutaneous, q24h  fluticasone, 2 spray, Each Nostril, Daily  fluticasone furoate-vilanteroL, 1 puff, inhalation, Daily  hydrOXYzine HCL, 50 mg, oral, Nightly  ipratropium-albuteroL, 3 mL, nebulization, q6h  lidocaine, 1 patch, transdermal, Daily  melatonin, 5 mg, oral, Nightly  methylPREDNISolone sodium succinate (PF), 40 mg, intravenous, q8h  metoprolol tartrate, 12.5 mg, oral, BID  montelukast, 10 mg, oral, Nightly  pantoprazole, 40 mg, oral, Daily before breakfast  polyethylene glycol, 17 g, oral, BID  risperiDONE, 0.5 mg, oral, BID  risperiDONE, 3.5 mg, oral, Nightly      Continuous medications     PRN medications  PRN medications: benzonatate, docusate sodium, ipratropium-albuteroL, lubricating eye drops, oxygen, polyethylene glycol, racepinephrine    Results for orders placed or performed during the hospital encounter of 06/07/24 (from the past 24 hour(s))   CBC   Result Value Ref Range    WBC 10.1 4.4 - 11.3 x10*3/uL    nRBC 0.0 0.0 - 0.0 /100 WBCs    RBC 4.47 4.00 - 5.20 x10*6/uL    Hemoglobin 13.4 12.0 - 16.0 g/dL    Hematocrit 40.6 36.0 - 46.0 %    MCV 91 80 - 100 fL    MCH 30.0 26.0 - 34.0 pg    MCHC 33.0 32.0 - 36.0 g/dL    RDW 13.3 11.5 - 14.5 %    Platelets 298 150 - 450 x10*3/uL   Comprehensive Metabolic Panel   Result Value Ref Range    Glucose 121 (H) 74 - 99 mg/dL    Sodium 136 136 - 145 mmol/L    Potassium 4.5 3.5 - 5.3 mmol/L    Chloride 99 98 - 107 mmol/L    Bicarbonate 30 21 - 32 mmol/L    Anion Gap 12 10 - 20 mmol/L    Urea Nitrogen 12 6 - 23 mg/dL    Creatinine 0.51 0.50 - 1.05 mg/dL    eGFR >90 >60 mL/min/1.73m*2    Calcium 9.3 8.6 - 10.3 mg/dL    Albumin 3.5 3.4 - 5.0 g/dL    Alkaline Phosphatase 48 33 - 110 U/L    Total Protein 6.5 6.4 - 8.2 g/dL    AST 8 (L) 9 - 39 U/L    Bilirubin, Total 0.3 0.0 - 1.2 mg/dL    ALT 10 7 - 45 U/L       * Cannot find OR log *  Last relevant  "procedure:        This patient currently has cardiac telemetry ordered; if you would like to modify or discontinue the telemetry order, click here to go to the orders activity to modify/discontinue the order.    Assessment/Plan   Principal Problem:    Shortness of breath    Anisha Quevedo is a 28 y.o. female  with a history of cerebral palsy with spastic quadriplegia, asthma, schizophrenia who presented to [facility] for shortness of breath.      GI consult for \"evaluate for pooling of oropharyngeal secretions/concern for swallowing difficulty and further testing/high risk for aspiration/within 24 hours\"     #Vomiting, self-reported, with and without preceding nausea  #Constipation  #GERD     No vomiting episodes so far while admitted.  Per bedside RN patient tolerates solid minced/moist 1:1 diet well.  Per discussion with speech therapy team recent MBS x 2 without abnormalities, no aspiration detected and pharyngeal swallow phase within normal limits however it appeared that patient is not chewing well     EGD 1/27/23 for epigastric abdominal pain widely patent and nonobstructing Schatzki's ring, 1 cm hiatal hernia, gastric diverticulum, normal examined duodenum.  Scar in the anterior gastric body suggestive of prior PEG site     Most likely patient might vomit due to history of GERD/heartburn, was put on PPI after last EGD but unclear how well she was following with prescription     Continue Protonix 40 mg p.o. daily as ordered, will observe patient for any vomiting episodes    No significant oropharyngeal secretion pooling observed during examinations.  Patient denies dysphagia, no swallowing difficulties observed by bedside RN, minced/moist diet is tolerated well, with 75% meal consumed today per bedside RN.    No endoscopic interventions indicated at current time.  Recent MBS x 2 as above  No indication for esophagram as well as it will be of low diagnostic value for oropharyngeal pooling/dysphagia    If any vomiting " detected, will consider repeat EGD on inpatient basis due to history of Schatzki's ring detected on previous procedure     MiraLAX 17 g p.o. twice daily, to be continued long-term, might need further bowel regimen escalation if no BM in next 1 to 2 days     Patient discussed with Dr. Matt CLARKE spent 30 minutes in the professional and overall care of this patient.      Mago Pfeiffer, APRN-CNP

## 2024-06-12 NOTE — PROGRESS NOTES
"Anisha Quevedo is a 28 y.o. female on day 4 of admission presenting with Shortness of breath.    Subjective   Patient is awake and alert.  Denies dyspnea at rest.  She has mild nonproductive cough.  No fever or chills.       Objective     Physical Exam  Head and face no deformities  Oropharynx normal mucosa  Neck is supple no thyromegaly  Chest is symmetric no crackles  Heart is regular no murmurs  Abdomen is soft and nontender  Skin is intact     Last Recorded Vitals  Blood pressure 115/59, pulse 92, temperature 36.6 °C (97.9 °F), temperature source Temporal, resp. rate 16, height 1.473 m (4' 10\"), weight 77.1 kg (170 lb), SpO2 90%.  Intake/Output last 3 Shifts:  I/O last 3 completed shifts:  In: 1150 (14.9 mL/kg) [P.O.:1150]  Out: 3800 (49.3 mL/kg) [Urine:3800 (1.4 mL/kg/hr)]  Weight: 77.1 kg                       Assessment/Plan      Asthma mild exacerbation currently no wheezing.  Mildly enlarged adenoids in the neck with mild narrowing of the nasopharynx currently asymptomatic.  Mild hypoxia.    Plan:  Taper off steroids.  Bronchodilators.  Wean off oxygen for saturation between 89 and 95%.  Aspiration precautions.  DVT prophylaxis.      Delgado Lira MD      "

## 2024-06-13 LAB
ALBUMIN SERPL BCP-MCNC: 3.5 G/DL (ref 3.4–5)
ALP SERPL-CCNC: 47 U/L (ref 33–110)
ALT SERPL W P-5'-P-CCNC: 18 U/L (ref 7–45)
ANION GAP SERPL CALC-SCNC: 12 MMOL/L (ref 10–20)
AST SERPL W P-5'-P-CCNC: 17 U/L (ref 9–39)
BILIRUB SERPL-MCNC: 0.3 MG/DL (ref 0–1.2)
BUN SERPL-MCNC: 13 MG/DL (ref 6–23)
CALCIUM SERPL-MCNC: 9.1 MG/DL (ref 8.6–10.3)
CHLORIDE SERPL-SCNC: 99 MMOL/L (ref 98–107)
CO2 SERPL-SCNC: 29 MMOL/L (ref 21–32)
CREAT SERPL-MCNC: 0.45 MG/DL (ref 0.5–1.05)
EGFRCR SERPLBLD CKD-EPI 2021: >90 ML/MIN/1.73M*2
ERYTHROCYTE [DISTWIDTH] IN BLOOD BY AUTOMATED COUNT: 13.4 % (ref 11.5–14.5)
GLUCOSE SERPL-MCNC: 119 MG/DL (ref 74–99)
HCT VFR BLD AUTO: 40.8 % (ref 36–46)
HGB BLD-MCNC: 13.4 G/DL (ref 12–16)
MCH RBC QN AUTO: 30 PG (ref 26–34)
MCHC RBC AUTO-ENTMCNC: 32.8 G/DL (ref 32–36)
MCV RBC AUTO: 91 FL (ref 80–100)
NRBC BLD-RTO: 0.2 /100 WBCS (ref 0–0)
PLATELET # BLD AUTO: 302 X10*3/UL (ref 150–450)
POTASSIUM SERPL-SCNC: 4.6 MMOL/L (ref 3.5–5.3)
PROT SERPL-MCNC: 6.4 G/DL (ref 6.4–8.2)
RBC # BLD AUTO: 4.47 X10*6/UL (ref 4–5.2)
SODIUM SERPL-SCNC: 135 MMOL/L (ref 136–145)
WBC # BLD AUTO: 12 X10*3/UL (ref 4.4–11.3)

## 2024-06-13 PROCEDURE — 2500000004 HC RX 250 GENERAL PHARMACY W/ HCPCS (ALT 636 FOR OP/ED): Performed by: STUDENT IN AN ORGANIZED HEALTH CARE EDUCATION/TRAINING PROGRAM

## 2024-06-13 PROCEDURE — 84075 ASSAY ALKALINE PHOSPHATASE: CPT | Performed by: INTERNAL MEDICINE

## 2024-06-13 PROCEDURE — 2500000004 HC RX 250 GENERAL PHARMACY W/ HCPCS (ALT 636 FOR OP/ED): Performed by: INTERNAL MEDICINE

## 2024-06-13 PROCEDURE — 2500000001 HC RX 250 WO HCPCS SELF ADMINISTERED DRUGS (ALT 637 FOR MEDICARE OP): Performed by: STUDENT IN AN ORGANIZED HEALTH CARE EDUCATION/TRAINING PROGRAM

## 2024-06-13 PROCEDURE — 85027 COMPLETE CBC AUTOMATED: CPT | Performed by: INTERNAL MEDICINE

## 2024-06-13 PROCEDURE — 36415 COLL VENOUS BLD VENIPUNCTURE: CPT | Performed by: INTERNAL MEDICINE

## 2024-06-13 PROCEDURE — 1200000002 HC GENERAL ROOM WITH TELEMETRY DAILY

## 2024-06-13 PROCEDURE — 2500000002 HC RX 250 W HCPCS SELF ADMINISTERED DRUGS (ALT 637 FOR MEDICARE OP, ALT 636 FOR OP/ED): Performed by: INTERNAL MEDICINE

## 2024-06-13 PROCEDURE — 94640 AIRWAY INHALATION TREATMENT: CPT | Mod: MUE

## 2024-06-13 PROCEDURE — 2500000002 HC RX 250 W HCPCS SELF ADMINISTERED DRUGS (ALT 637 FOR MEDICARE OP, ALT 636 FOR OP/ED): Performed by: STUDENT IN AN ORGANIZED HEALTH CARE EDUCATION/TRAINING PROGRAM

## 2024-06-13 PROCEDURE — 2500000005 HC RX 250 GENERAL PHARMACY W/O HCPCS: Performed by: STUDENT IN AN ORGANIZED HEALTH CARE EDUCATION/TRAINING PROGRAM

## 2024-06-13 PROCEDURE — 2500000004 HC RX 250 GENERAL PHARMACY W/ HCPCS (ALT 636 FOR OP/ED): Performed by: NURSE PRACTITIONER

## 2024-06-13 RX ADMIN — AMPICILLIN SODIUM AND SULBACTAM SODIUM 3 G: 2; 1 INJECTION, POWDER, FOR SOLUTION INTRAMUSCULAR; INTRAVENOUS at 23:45

## 2024-06-13 RX ADMIN — AMPICILLIN SODIUM AND SULBACTAM SODIUM 3 G: 2; 1 INJECTION, POWDER, FOR SOLUTION INTRAMUSCULAR; INTRAVENOUS at 17:17

## 2024-06-13 RX ADMIN — RISPERIDONE 0.5 MG: 1 TABLET, FILM COATED ORAL at 08:08

## 2024-06-13 RX ADMIN — METHYLPREDNISOLONE SODIUM SUCCINATE 40 MG: 40 INJECTION, POWDER, FOR SOLUTION INTRAMUSCULAR; INTRAVENOUS at 11:33

## 2024-06-13 RX ADMIN — HYDROXYZINE HYDROCHLORIDE 50 MG: 25 TABLET ORAL at 20:28

## 2024-06-13 RX ADMIN — RISPERIDONE 0.5 MG: 1 TABLET, FILM COATED ORAL at 13:35

## 2024-06-13 RX ADMIN — ENOXAPARIN SODIUM 40 MG: 40 INJECTION SUBCUTANEOUS at 11:33

## 2024-06-13 RX ADMIN — METOPROLOL TARTRATE 12.5 MG: 25 TABLET, FILM COATED ORAL at 08:08

## 2024-06-13 RX ADMIN — METOPROLOL TARTRATE 12.5 MG: 25 TABLET, FILM COATED ORAL at 20:28

## 2024-06-13 RX ADMIN — RISPERIDONE 3.5 MG: 1 TABLET, FILM COATED ORAL at 20:28

## 2024-06-13 RX ADMIN — IPRATROPIUM BROMIDE AND ALBUTEROL SULFATE 3 ML: .5; 3 SOLUTION RESPIRATORY (INHALATION) at 07:29

## 2024-06-13 RX ADMIN — PANTOPRAZOLE SODIUM 40 MG: 40 TABLET, DELAYED RELEASE ORAL at 06:29

## 2024-06-13 RX ADMIN — LIDOCAINE 1 PATCH: 4 PATCH TOPICAL at 08:09

## 2024-06-13 RX ADMIN — MONTELUKAST 10 MG: 10 TABLET, FILM COATED ORAL at 20:28

## 2024-06-13 RX ADMIN — AMPICILLIN SODIUM AND SULBACTAM SODIUM 3 G: 2; 1 INJECTION, POWDER, FOR SOLUTION INTRAMUSCULAR; INTRAVENOUS at 06:29

## 2024-06-13 RX ADMIN — FLUTICASONE PROPIONATE 2 SPRAY: 50 SPRAY, METERED NASAL at 09:00

## 2024-06-13 RX ADMIN — POLYETHYLENE GLYCOL 3350 17 G: 17 POWDER, FOR SOLUTION ORAL at 08:12

## 2024-06-13 RX ADMIN — IPRATROPIUM BROMIDE AND ALBUTEROL SULFATE 3 ML: .5; 3 SOLUTION RESPIRATORY (INHALATION) at 19:24

## 2024-06-13 RX ADMIN — IPRATROPIUM BROMIDE AND ALBUTEROL SULFATE 3 ML: .5; 3 SOLUTION RESPIRATORY (INHALATION) at 12:50

## 2024-06-13 RX ADMIN — METHYLPREDNISOLONE SODIUM SUCCINATE 40 MG: 40 INJECTION, POWDER, FOR SOLUTION INTRAMUSCULAR; INTRAVENOUS at 03:43

## 2024-06-13 RX ADMIN — Medication 5 MG: at 20:28

## 2024-06-13 RX ADMIN — AMPICILLIN SODIUM AND SULBACTAM SODIUM 3 G: 2; 1 INJECTION, POWDER, FOR SOLUTION INTRAMUSCULAR; INTRAVENOUS at 11:33

## 2024-06-13 RX ADMIN — FLUTICASONE FUROATE AND VILANTEROL TRIFENATATE 1 PUFF: 100; 25 POWDER RESPIRATORY (INHALATION) at 07:33

## 2024-06-13 ASSESSMENT — PAIN SCALES - GENERAL
PAINLEVEL_OUTOF10: 0 - NO PAIN
PAINLEVEL_OUTOF10: 0 - NO PAIN

## 2024-06-13 NOTE — PROGRESS NOTES
Anisha Quevedo is a 28 y.o. female on day 5 of admission presenting with Shortness of breath.      Subjective   Patient seen and examined by me.  Patient is resting in bed and her breathing is much better and wheezing is much improved.  Her cough has also improved.  Patient is afebrile.  She remains on 1 L of nasal cannula oxygen and is afebrile and heart rate is 88.  Patient states she is feeling better.  She is awake and alert in no acute distress.       Objective     Last Recorded Vitals  /72 (BP Location: Right arm, Patient Position: Lying)   Pulse 88   Temp 37.6 °C (99.7 °F) (Temporal)   Resp 18   Wt 77.1 kg (170 lb)   SpO2 96%   Intake/Output last 3 Shifts:    Intake/Output Summary (Last 24 hours) at 6/13/2024 1930  Last data filed at 6/13/2024 1412  Gross per 24 hour   Intake 100 ml   Output 900 ml   Net -800 ml       Admission Weight  Weight: 77.1 kg (170 lb) (06/07/24 2035)    Daily Weight  06/07/24 : 77.1 kg (170 lb)    Image Results  XR chest 2 views  Narrative: Interpreted By:  Marino James,   STUDY:  XR CHEST 2 VIEWS;  6/12/2024 6:18 pm      INDICATION:  Signs/Symptoms:sob.      COMPARISON:  None.      ACCESSION NUMBER(S):  [id number]      ORDERING CLINICIAN:  NORMA BENAVIDES      FINDINGS:                  CARDIOMEDIASTINAL SILHOUETTE:  Cardiomediastinal silhouette is normal in size and configuration.      LUNGS:  Trace left basal atelectasis. No major pulmonary infiltrate or  pleural effusion. No pneumothorax.      ABDOMEN:  Gas distended bowel loops/stomach in the left upper quadrant.      BONES:  Posterior spinal rods extending throughout the thoracic spine      Impression: 1. Trace left basal atelectasis.  2. Gas distended left upper quadrant structures either stomach or  colonic loops.              MACRO:  None      Signed by: Marino James 6/13/2024 8:19 AM  Dictation workstation:   [id number]      Physical Exam  Vitals and nursing note reviewed.   Constitutional:       General: She is  not in acute distress.     Appearance: Normal appearance. She is normal weight. She is not ill-appearing or toxic-appearing.   HENT:      Head: Normocephalic and atraumatic.      Right Ear: Tympanic membrane and ear canal normal. There is no impacted cerumen.      Left Ear: Tympanic membrane, ear canal and external ear normal.      Nose: Nose normal. No congestion or rhinorrhea.      Mouth/Throat:      Pharynx: Oropharynx is clear. No oropharyngeal exudate or posterior oropharyngeal erythema.   Eyes:      General: No scleral icterus.        Right eye: No discharge.         Left eye: No discharge.      Extraocular Movements: Extraocular movements intact.      Conjunctiva/sclera: Conjunctivae normal.      Pupils: Pupils are equal, round, and reactive to light.   Neck:      Vascular: No carotid bruit.   Cardiovascular:      Rate and Rhythm: Normal rate and regular rhythm.      Pulses: Normal pulses.      Heart sounds: Normal heart sounds, S1 normal and S2 normal. No murmur heard.     No gallop.   Pulmonary:      Effort: Pulmonary effort is normal. No respiratory distress.      Breath sounds: Normal breath sounds. No rales.      Comments: Lung exam/much improved bilateral wheezing and few scattered rhonchi at the bases/  Abdominal:      General: Abdomen is flat. Bowel sounds are normal. There is no distension.      Palpations: Abdomen is soft. There is no mass.      Tenderness: There is no abdominal tenderness. There is no right CVA tenderness, left CVA tenderness, guarding or rebound.      Hernia: No hernia is present.   Musculoskeletal:         General: No swelling or tenderness. Normal range of motion.      Cervical back: Normal range of motion and neck supple. No rigidity.      Right lower leg: No edema.      Left lower leg: No edema.   Lymphadenopathy:      Cervical: No cervical adenopathy.   Skin:     General: Skin is warm.      Coloration: Skin is not jaundiced.      Findings: No erythema or rash.   Neurological:       General: No focal deficit present.      Mental Status: She is alert and oriented to person, place, and time. Mental status is at baseline.      Sensory: No sensory deficit.      Motor: No weakness.      Gait: Gait normal.      Deep Tendon Reflexes: Reflexes normal.   Psychiatric:         Mood and Affect: Mood normal.         Thought Content: Thought content normal.         Judgment: Judgment normal.         Relevant Results    Current Facility-Administered Medications:     ampicillin-sulbactam (Unasyn) in sodium chloride 0.9 % 100 mL 3 g, 3 g, intravenous, q6h, Lorraine Moraes DO, Stopped at 06/13/24 1747    benzonatate (Tessalon) capsule 100 mg, 100 mg, oral, TID PRN, Charles Mead MD, 100 mg at 06/10/24 0842    docusate sodium (Colace) capsule 100 mg, 100 mg, oral, BID PRN, Charles Mead MD    enoxaparin (Lovenox) syringe 40 mg, 40 mg, subcutaneous, q24h, Charles Mead MD, 40 mg at 06/13/24 1133    fluticasone (Flonase) nasal spray 2 spray, 2 spray, Each Nostril, Daily, Lorraine Moraes DO, 2 spray at 06/13/24 0900    fluticasone furoate-vilanteroL (Breo Ellipta) 100-25 mcg/dose inhaler 1 puff, 1 puff, inhalation, Daily, Lorraine Moraes DO, 1 puff at 06/13/24 0733    hydrOXYzine HCL (Atarax) tablet 50 mg, 50 mg, oral, Nightly, Lorraine Moraes DO, 50 mg at 06/12/24 2100    ipratropium-albuteroL (Duo-Neb) 0.5-2.5 mg/3 mL nebulizer solution 3 mL, 3 mL, nebulization, q6h, Charles Mead MD, 3 mL at 06/13/24 1924    ipratropium-albuteroL (Duo-Neb) 0.5-2.5 mg/3 mL nebulizer solution 3 mL, 3 mL, nebulization, q2h PRN, Charles Mead MD    lidocaine 4 % patch 1 patch, 1 patch, transdermal, Daily, Aric Pearce MD PhD, 1 patch at 06/13/24 0809    lubricating eye drops ophthalmic solution 1 drop, 1 drop, Both Eyes, q8h PRN, Charles Mead MD, 1 drop at 06/12/24 0959    melatonin tablet 5 mg, 5 mg, oral, Nightly, Lorraine Moraes DO, 5 mg at 06/12/24 2100    [START ON 6/14/2024] methylPREDNISolone sod succinate  (SOLU-Medrol) 40 mg/mL injection 40 mg, 40 mg, intravenous, q24h, Delgado KRISHNA MD    metoprolol tartrate (Lopressor) tablet 12.5 mg, 12.5 mg, oral, BID, Sohaib Moraes, DO, 12.5 mg at 06/13/24 0808    montelukast (Singulair) tablet 10 mg, 10 mg, oral, Nightly, Sohaib Moraes, DO, 10 mg at 06/12/24 2100    oxygen (O2) therapy, , inhalation, Continuous PRN - O2/gases, Charles Mead MD, 1 L/min at 06/11/24 0900    pantoprazole (ProtoNix) EC tablet 40 mg, 40 mg, oral, Daily before breakfast, Sohaib Moraes, DO, 40 mg at 06/13/24 0629    polyethylene glycol (Glycolax, Miralax) packet 17 g, 17 g, oral, Daily PRN, Sohaib Moraes, DO    polyethylene glycol (Glycolax, Miralax) powder 17 g, 17 g, oral, BID, JESSICA Palacio-CNP, 17 g at 06/13/24 0812    racepinephrine (Asthmanefrin) 2.25 % nebulizer solution 0.5 mL, 0.5 mL, nebulization, q3h PRN, Jaime Velazquez MD, 0.5 mL at 06/09/24 0946    risperiDONE (RisperDAL) tablet 0.5 mg, 0.5 mg, oral, BID, Sohaib Moraes, DO, 0.5 mg at 06/13/24 1335    risperiDONE (RisperDAL) tablet 3.5 mg, 3.5 mg, oral, Nightly, Sohaib Moraes, DO, 3.5 mg at 06/12/24 2100   XR chest 2 views    Result Date: 6/13/2024  Interpreted By:  Marino James, STUDY: XR CHEST 2 VIEWS;  6/12/2024 6:18 pm   INDICATION: Signs/Symptoms:sob.   COMPARISON: None.   ACCESSION NUMBER(S): [id number]   ORDERING CLINICIAN: NORMA BENAVIDES   FINDINGS:         CARDIOMEDIASTINAL SILHOUETTE: Cardiomediastinal silhouette is normal in size and configuration.   LUNGS: Trace left basal atelectasis. No major pulmonary infiltrate or pleural effusion. No pneumothorax.   ABDOMEN: Gas distended bowel loops/stomach in the left upper quadrant.   BONES: Posterior spinal rods extending throughout the thoracic spine       1. Trace left basal atelectasis. 2. Gas distended left upper quadrant structures either stomach or colonic loops.       MACRO: None   Signed by: Marino James 6/13/2024 8:19 AM Dictation workstation:    [id number]    Electrocardiogram, 12-lead PRN ACS symptoms    Result Date: 6/12/2024  Normal sinus rhythm with sinus arrhythmia Nonspecific ST abnormality Abnormal ECG Confirmed by Ramicone, James ([id number]) on 6/12/2024 5:12:59 AM    Electrocardiogram, 12-lead PRN ACS symptoms    Result Date: 6/12/2024  Normal sinus rhythm Nonspecific ST and T wave abnormality Abnormal ECG When compared with ECG of 10-CECI-2024 09:53, (unconfirmed) No significant change was found Confirmed by Ramicone, James ([id number]) on 6/12/2024 5:10:44 AM    ECG 12 lead    Result Date: 6/10/2024  Normal sinus rhythm Left ventricular hypertrophy with QRS widening and repolarization abnormality ( R in aVL , William product ) Abnormal ECG    ECG 12 lead    Result Date: 6/10/2024  Sinus tachycardia Nonspecific T wave abnormality Abnormal ECG When compared with ECG of 06-DEC-2023 15:08, No significant change was found    CT soft tissue neck wo IV contrast    Result Date: 6/8/2024  Interpreted By:  Alejandrina Waller, STUDY: CT SOFT TISSUE NECK WO IV CONTRAST;  6/8/2024 3:42 am   INDICATION: Signs/Symptoms:stridor, eval for upper airway obstruction.   COMPARISON: CT head 06/20/2019   ACCESSION NUMBER(S): [id number]   ORDERING CLINICIAN: MEGAN WHITE   TECHNIQUE: Axial CT images of the neck obtained with no contrast administration. Coronal and sagittal reformats were performed.   FINDINGS:   There is motion artifact.   MUCOSAL SPACES AND CERVICAL SOFT TISSUES: There are mildly enlarged adenoids with mild narrowing of the nasopharynx. No obvious fluid collection.   CERVICAL LYMPH NODES: There are multiple bilateral mildly prominent cervical lymph nodes, not pathologically enlarged by CT criteria.   VISUALIZED INTRACRANIAL STRUCTURES AND ORBITS: Unremarkable.   CENTRAL AIRWAY AND LUNG APICES: There is mild narrowing of nasopharynx. Otherwise, the central airway is patent. The lung apices are clear.   THYROID GLAND: Unremarkable unenhanced appearance.    PAROTID AND SUBMANDIBULAR GLANDS: No adjacent inflammatory changes. No sialolithiasis.   PARANASAL SINUSES: Small amount of mucus at the bilateral sphenoid sinuses. There is mild mucosal thickening at the left maxillary sinus.   OSSEOUS STRUCTURES: Multilevel degenerative changes of the cervical spine with reversal of the cervical lordosis. Partially visualized orthopedic hardware at the upper thoracic spine.       1.  Mildly enlarged adenoids with mild narrowing of the nasopharynx; please correlate for acute adenoiditis. Otherwise, the central airway is patent .       MACRO: None.   Signed by: Alejandrina Waller 6/8/2024 4:05 AM Dictation workstation:   [id number]    CT angio chest for pulmonary embolism    Result Date: 6/8/2024  STUDY: CT Angiogram of the Chest; 6/7/2024 11:56 PM. INDICATION: Tachycardia.  Elevated d-dimer.  Evaluate for pulmonary embolism. COMPARISON: CXR 6/7/2024. ACCESSION NUMBER(S): [id number] ORDERING CLINICIAN: MEGAN WHITE TECHNIQUE:  CTA of the chest was performed with intravenous contrast. Images are reviewed and processed at a workstation according to the CT angiogram protocol with 3-D and/or MIP post processing imaging generated.  Omnipaque 350 90 mL was administered intravenously. Automated mA/kV exposure control was utilized and patient examination was performed in strict accordance with principles of ALARA. FINDINGS: Examination is significantly degraded by respiratory motion.  As such, there is suboptimal evaluation of the segmental and subsegmental pulmonary arterial branches.  No large central pulmonary embolus is otherwise identified.  The thoracic aorta is normal in course and caliber without dissection or aneurysm. The heart is normal in size without pericardial effusion.  Thoracic lymph nodes are not enlarged. There is no pleural effusion, pleural thickening, or pneumothorax. The airways are patent. Minimal bibasilar atelectasis.  Lungs otherwise clear without distinct  consolidation or evidence for pulmonary edema. Upper abdomen demonstrates no acute pathology. There are no acute fractures.  No suspicious bony lesions.  Thoracal lumbar scoliosis again demonstrated with postoperative changes of thoracolumbar fixation with Auguste rods in place.    Suboptimal evaluation of the segmental/subsegmental pulmonary arterial branches due to extensive respiratory motion.  No central pulmonary embolus otherwise identified. No distinct acute intrathoracic process otherwise seen. Signed by Dwayne Weir MD    XR chest 1 view    Result Date: 6/7/2024  STUDY: Chest Radiograph;  06/07/2024 at 9:27 PM INDICATION: Recent diagnosis of pneumonia, presents with increasing shortness of breath. COMPARISON: XR chest 05/03/24, 12/06/23, 06/30/19. ACCESSION NUMBER(S): [id number] ORDERING CLINICIAN: MEGAN WHITE TECHNIQUE:  Frontal chest was obtained at 2127 hours. FINDINGS: CARDIOMEDIASTINAL SILHOUETTE: Cardiomediastinal silhouette is normal in size and configuration.  LUNGS: Lungs are clear. There is no pneumothorax.  ABDOMEN: No remarkable upper abdominal findings.  Posterior spinal rods extending throughout the thoracic spine.    No detectable active cardiopulmonary disease. Signed by Jonathan Camargo MD    Results for orders placed or performed during the hospital encounter of 06/07/24 (from the past 24 hour(s))   CBC   Result Value Ref Range    WBC 12.0 (H) 4.4 - 11.3 x10*3/uL    nRBC 0.2 (H) 0.0 - 0.0 /100 WBCs    RBC 4.47 4.00 - 5.20 x10*6/uL    Hemoglobin 13.4 12.0 - 16.0 g/dL    Hematocrit 40.8 36.0 - 46.0 %    MCV 91 80 - 100 fL    MCH 30.0 26.0 - 34.0 pg    MCHC 32.8 32.0 - 36.0 g/dL    RDW 13.4 11.5 - 14.5 %    Platelets 302 150 - 450 x10*3/uL   Comprehensive Metabolic Panel   Result Value Ref Range    Glucose 119 (H) 74 - 99 mg/dL    Sodium 135 (L) 136 - 145 mmol/L    Potassium 4.6 3.5 - 5.3 mmol/L    Chloride 99 98 - 107 mmol/L    Bicarbonate 29 21 - 32 mmol/L    Anion Gap 12 10 - 20  mmol/L    Urea Nitrogen 13 6 - 23 mg/dL    Creatinine 0.45 (L) 0.50 - 1.05 mg/dL    eGFR >90 >60 mL/min/1.73m*2    Calcium 9.1 8.6 - 10.3 mg/dL    Albumin 3.5 3.4 - 5.0 g/dL    Alkaline Phosphatase 47 33 - 110 U/L    Total Protein 6.4 6.4 - 8.2 g/dL    AST 17 9 - 39 U/L    Bilirubin, Total 0.3 0.0 - 1.2 mg/dL    ALT 18 7 - 45 U/L           Assessment/Plan   This patient currently has cardiac telemetry ordered; if you would like to modify or discontinue the telemetry order, click here to go to the orders activity to modify/discontinue the order.              Principal Problem:    Shortness of breath  #1/acute exacerbation of asthmatic bronchitis and bilateral wheezing.  Wheezing has much improved.  Patient remains on 1 L of nasal cannula oxygen and remains on DuoNeb aerosol treatments.  Her IV steroids have been decreased to 40 mg IV every 8 12 hours.  Patient remains on inhaled steroids.  Plan on weaning off oxygen.  Her upper airway secretions and gurgling is also much improved.  Patient is clinically feeling better.  Chest x-ray done yesterday was negative for any infiltrates or congestion.  Patient has trace left basilar atelectasis and gas distended left upper quadrant gastric or colonic loops/she denies any abdominal pain.  Will discontinue IV antibiotics in AM.  Patient will finish a course of 7 days.  Chest x-ray has no pneumonia.  Her cough is much better.  2./S/p swallowing difficulty and oropharyngeal pooling of secretions/aspiration precautions are recommended at all times.  Patient has been seen by GI and no further workup needed at present time.  Patient has been seen by speech therapy and has been advised solid minced moist diet and patient has had a recent modified barium swallow x 2 without abnormalities and no aspiration was noted as per GI note and pharyngeal swallowing phase was within normal limits but patient has not been chewing her food and hence the minced food.  Her last EGD done 1/2023 had  a nonobstructive Schatzki's ring 1 cm hiatal hernia and gastric diverticulum and scar in the anterior gastric body suggestive of prior PEG tube.  Patient was started on a PPI for possible GERD and continue Protonix 40 mg p.o. daily.  #3/constipation/patient has had a BM and has been advised to use MiraLAX 1 scoop twice daily and stool softeners as needed.   #4/enlarged adenoids on CT of the neck on admission/ENT consultation was obtained a few days ago and they will be reminded about the consultation.  Patient may need evaluation in the office by ENT also.  #5/labs reviewed and sodium is 135 and WBC count has mild leukocytosis at 12.0 most likely secondary to her steroids.  BUN/creatinine are stable at 13 and 0.45.  Patient also has mild hyperglycemia and blood sugars are in the 120s to 100 most likely secondary to IV steroids.   #6/history of cerebral palsy and spastic quadriplegia/history of anxiety and schizoaffective disorder and psychosis and patient remains stable on her current medications.  7./Obesity/BMI 35.53.  Pulmonary and GI notes and recommendations noted.  Possible discharge in next 24 to 48 hours.  Overall patient is clinically improving.  Total time spent 20 minutes/time spent on patient interaction/assessment and plan and documentation.    Charles Mead MD

## 2024-06-13 NOTE — CARE PLAN
The patient's goals for the shift include      The clinical goals for the shift include maintain safety      Problem: Skin  Goal: Participates in plan/prevention/treatment measures  Outcome: Progressing  Flowsheets (Taken 6/12/2024 1100 by Neetu Kim RN)  Participates in plan/prevention/treatment measures:   Discuss with provider PT/OT consult   Elevate heels  Goal: Prevent/manage excess moisture  Outcome: Progressing  Flowsheets (Taken 6/12/2024 1100 by Neetu Kim RN)  Prevent/manage excess moisture:   Moisturize dry skin   Cleanse incontinence/protect with barrier cream   Monitor for/manage infection if present  Goal: Prevent/minimize sheer/friction injuries  Outcome: Progressing  Flowsheets (Taken 6/12/2024 1100 by Neetu Kim RN)  Prevent/minimize sheer/friction injuries:   Use pull sheet   Complete micro-shifts as needed if patient unable. Adjust patient position to relieve pressure points, not a full turn   HOB 30 degrees or less   Turn/reposition every 2 hours/use positioning/transfer devices  Goal: Promote/optimize nutrition  Outcome: Progressing  Flowsheets (Taken 6/12/2024 1100 by Neetu Kim RN)  Promote/optimize nutrition:   Assist with feeding   Consume > 50% meals/supplements   Offer water/supplements/favorite foods   Monitor/record intake including meals     Problem: Respiratory  Goal: No signs of respiratory distress (eg. Use of accessory muscles. Peds grunting)  Outcome: Progressing  Goal: Patent airway maintained this shift  Outcome: Progressing  Goal: Verbalize decreased shortness of breath this shift  Outcome: Progressing

## 2024-06-13 NOTE — PROGRESS NOTES
"Anisha Quevedo is a 28 y.o. female on day 5 of admission presenting with Shortness of breath.    Subjective      Patient is awake and alert.  She is in her bed.  Patient denies dyspnea.  She has mild nonproductive cough.  No fever or chills.       Objective     Physical Exam    Head and face no deformities  Oropharynx normal mucosa  Neck is supple no thyromegaly  Chest is symmetric no crackles wheezing  Heart is regular no murmurs  Abdomen is soft and nontender  Skin is intact  Joints are normal  Neurologically patient is moving all 4 limbs    Last Recorded Vitals  Blood pressure 115/66, pulse 80, temperature 35 °C (95 °F), temperature source Temporal, resp. rate 18, height 1.473 m (4' 10\"), weight 77.1 kg (170 lb), SpO2 93%.  Intake/Output last 3 Shifts:  I/O last 3 completed shifts:  In: 1000 (13 mL/kg) [IV Piggyback:1000]  Out: 1725 (22.4 mL/kg) [Urine:1725 (0.6 mL/kg/hr)]  Weight: 77.1 kg                       Assessment/Plan      Asthma exacerbation which is getting better currently patient does not have any wheezing.  Mild hypoxia currently on 2 L/min oxygen nasal cannula.  Enlarged adenoids in the neck currently no stridor and no symptoms.    Plan:  Steroids can be discontinued.  Continue bronchodilators and inhaled steroids.  Wean off oxygen for saturation between 89 and 95%.  DVT prophylaxis.      Delgado Lira MD      "

## 2024-06-13 NOTE — PROGRESS NOTES
"Anisha Quevedo is a 28 y.o. female on day 5 of admission presenting with Shortness of breath.    Subjective   6/13/2024.  Uneventful night.  Denies any vomiting in the last 24 hours.  No vomiting reported/recorded during current admission, continues to deny any abdominal discomfort or nausea, tolerates diet well.  Abdomen soft, nontender bowel sounds present.  Formed BM x 2 today       Objective     A 10 point review of system is negative except for what is mentioned in the HPI        The note was created using voice recognition transcription software. Despite proofreading, unintentional typographical errors may be present. Please contact the GI office with any questions or concerns.      Current Medications: reviewed     Vital Signs: Reviewed     Physical Exam:  General: no apparent distress, pleasant and cooperative  Skin:  Warm and dry, no jaundice  HEENT: No scleral icterus, no conjunctival pallor, normocephalic, atraumatic, mucous membranes moist  Neck:  atraumatic, trachea midline, no JVD  Chest:  decreased air entry to auscultation bilaterally. No wheezes, rales, or rhonchi  CV:  Regular rate and rhythm.  Positive S1/S2  Abdomen: no distension, +BS, soft, non-tender to palpation, no rebound tenderness, no guarding, no rigidity, no discernible ascites.  Few small well-healed old surgical scars on abdominal wall  Extremities: no lower extremity edema, Chronic pigmentary changes, no cyanosis  Neurological:  A&Ox3 , no asterixis  Psychiatric: cooperative      Investigations:  Labs, radiological imaging and cardiac work up were reviewed    Last Recorded Vitals  Blood pressure 115/66, pulse 80, temperature 35 °C (95 °F), temperature source Temporal, resp. rate 18, height 1.473 m (4' 10\"), weight 77.1 kg (170 lb), SpO2 93%.  Intake/Output last 3 Shifts:  I/O last 3 completed shifts:  In: 1000 (13 mL/kg) [IV Piggyback:1000]  Out: 1725 (22.4 mL/kg) [Urine:1725 (0.6 mL/kg/hr)]  Weight: 77.1 kg     Relevant " Results        Scheduled medications  ampicillin-sulbactam, 3 g, intravenous, q6h  enoxaparin, 40 mg, subcutaneous, q24h  fluticasone, 2 spray, Each Nostril, Daily  fluticasone furoate-vilanteroL, 1 puff, inhalation, Daily  hydrOXYzine HCL, 50 mg, oral, Nightly  ipratropium-albuteroL, 3 mL, nebulization, q6h  lidocaine, 1 patch, transdermal, Daily  melatonin, 5 mg, oral, Nightly  [START ON 6/14/2024] methylPREDNISolone sodium succinate (PF), 40 mg, intravenous, q24h  metoprolol tartrate, 12.5 mg, oral, BID  montelukast, 10 mg, oral, Nightly  pantoprazole, 40 mg, oral, Daily before breakfast  polyethylene glycol, 17 g, oral, BID  risperiDONE, 0.5 mg, oral, BID  risperiDONE, 3.5 mg, oral, Nightly      Continuous medications     PRN medications  PRN medications: benzonatate, docusate sodium, ipratropium-albuteroL, lubricating eye drops, oxygen, polyethylene glycol, racepinephrine    Results for orders placed or performed during the hospital encounter of 06/07/24 (from the past 24 hour(s))   CBC   Result Value Ref Range    WBC 12.0 (H) 4.4 - 11.3 x10*3/uL    nRBC 0.2 (H) 0.0 - 0.0 /100 WBCs    RBC 4.47 4.00 - 5.20 x10*6/uL    Hemoglobin 13.4 12.0 - 16.0 g/dL    Hematocrit 40.8 36.0 - 46.0 %    MCV 91 80 - 100 fL    MCH 30.0 26.0 - 34.0 pg    MCHC 32.8 32.0 - 36.0 g/dL    RDW 13.4 11.5 - 14.5 %    Platelets 302 150 - 450 x10*3/uL   Comprehensive Metabolic Panel   Result Value Ref Range    Glucose 119 (H) 74 - 99 mg/dL    Sodium 135 (L) 136 - 145 mmol/L    Potassium 4.6 3.5 - 5.3 mmol/L    Chloride 99 98 - 107 mmol/L    Bicarbonate 29 21 - 32 mmol/L    Anion Gap 12 10 - 20 mmol/L    Urea Nitrogen 13 6 - 23 mg/dL    Creatinine 0.45 (L) 0.50 - 1.05 mg/dL    eGFR >90 >60 mL/min/1.73m*2    Calcium 9.1 8.6 - 10.3 mg/dL    Albumin 3.5 3.4 - 5.0 g/dL    Alkaline Phosphatase 47 33 - 110 U/L    Total Protein 6.4 6.4 - 8.2 g/dL    AST 17 9 - 39 U/L    Bilirubin, Total 0.3 0.0 - 1.2 mg/dL    ALT 18 7 - 45 U/L       * Cannot find OR  "log *  Last relevant procedure:        This patient currently has cardiac telemetry ordered; if you would like to modify or discontinue the telemetry order, click here to go to the orders activity to modify/discontinue the order.      Assessment/Plan   Principal Problem:    Shortness of breath    Anisha Quevedo is a 28 y.o. female  with a history of cerebral palsy with spastic quadriplegia, asthma, schizophrenia who presented to [facility] for shortness of breath.      GI consult for \"evaluate for pooling of oropharyngeal secretions/concern for swallowing difficulty and further testing/high risk for aspiration/within 24 hours\"     #Vomiting, self-reported, with and without preceding nausea  #Constipation  #GERD     No vomiting episodes while admitted.  Per bedside RN patient tolerates solid minced/moist 1:1 diet well.  Per discussion with speech therapy team recent MBS x 2 without abnormalities, no aspiration detected and pharyngeal swallow phase within normal limits however it appeared that patient is not chewing well     EGD 1/27/23 for epigastric abdominal pain widely patent and nonobstructing Schatzki's ring, 1 cm hiatal hernia, gastric diverticulum, normal examined duodenum.  Scar in the anterior gastric body suggestive of prior PEG site     Most likely patient might vomit due to history of GERD/heartburn, was put on PPI after last EGD but unclear how well she was following with prescription     Continue Protonix 40 mg p.o. daily as ordered     No significant oropharyngeal secretion pooling observed during examinations.  Patient denies dysphagia, no swallowing difficulties observed by bedside RN, minced/moist diet is tolerated well, with 75% meal consumed today per bedside RN.     No endoscopic interventions indicated at current time.  Recent MBS x 2 as above  No indication for esophagram as well as it will be of low diagnostic value for oropharyngeal pooling/dysphagia     MiraLAX 17 g p.o. twice daily, to be " continued long-term, might need further bowel regimen escalation if no BM in next 1 to 2 days     Patient discussed with Dr. Gutierrez     GI to sign off at this time.  Please feel free to reconsult/reach out should any questions arise       I spent 20 minutes in the professional and overall care of this patient.      Mago Pfeiffer, APRN-CNP

## 2024-06-14 PROCEDURE — 2500000002 HC RX 250 W HCPCS SELF ADMINISTERED DRUGS (ALT 637 FOR MEDICARE OP, ALT 636 FOR OP/ED): Performed by: STUDENT IN AN ORGANIZED HEALTH CARE EDUCATION/TRAINING PROGRAM

## 2024-06-14 PROCEDURE — 2500000004 HC RX 250 GENERAL PHARMACY W/ HCPCS (ALT 636 FOR OP/ED): Performed by: STUDENT IN AN ORGANIZED HEALTH CARE EDUCATION/TRAINING PROGRAM

## 2024-06-14 PROCEDURE — 99221 1ST HOSP IP/OBS SF/LOW 40: CPT | Performed by: STUDENT IN AN ORGANIZED HEALTH CARE EDUCATION/TRAINING PROGRAM

## 2024-06-14 PROCEDURE — 94640 AIRWAY INHALATION TREATMENT: CPT | Mod: MUE

## 2024-06-14 PROCEDURE — 2500000005 HC RX 250 GENERAL PHARMACY W/O HCPCS: Performed by: STUDENT IN AN ORGANIZED HEALTH CARE EDUCATION/TRAINING PROGRAM

## 2024-06-14 PROCEDURE — 94667 MNPJ CHEST WALL 1ST: CPT

## 2024-06-14 PROCEDURE — 2500000002 HC RX 250 W HCPCS SELF ADMINISTERED DRUGS (ALT 637 FOR MEDICARE OP, ALT 636 FOR OP/ED): Mod: MUE | Performed by: INTERNAL MEDICINE

## 2024-06-14 PROCEDURE — 2500000004 HC RX 250 GENERAL PHARMACY W/ HCPCS (ALT 636 FOR OP/ED): Performed by: INTERNAL MEDICINE

## 2024-06-14 PROCEDURE — 2500000001 HC RX 250 WO HCPCS SELF ADMINISTERED DRUGS (ALT 637 FOR MEDICARE OP): Performed by: STUDENT IN AN ORGANIZED HEALTH CARE EDUCATION/TRAINING PROGRAM

## 2024-06-14 PROCEDURE — 1200000002 HC GENERAL ROOM WITH TELEMETRY DAILY

## 2024-06-14 PROCEDURE — 2500000004 HC RX 250 GENERAL PHARMACY W/ HCPCS (ALT 636 FOR OP/ED): Performed by: NURSE PRACTITIONER

## 2024-06-14 RX ORDER — IPRATROPIUM BROMIDE AND ALBUTEROL SULFATE 2.5; .5 MG/3ML; MG/3ML
3 SOLUTION RESPIRATORY (INHALATION)
Status: DISCONTINUED | OUTPATIENT
Start: 2024-06-14 | End: 2024-06-18 | Stop reason: HOSPADM

## 2024-06-14 RX ADMIN — IPRATROPIUM BROMIDE AND ALBUTEROL SULFATE 3 ML: .5; 3 SOLUTION RESPIRATORY (INHALATION) at 06:58

## 2024-06-14 RX ADMIN — PANTOPRAZOLE SODIUM 40 MG: 40 TABLET, DELAYED RELEASE ORAL at 05:54

## 2024-06-14 RX ADMIN — HYDROXYZINE HYDROCHLORIDE 50 MG: 25 TABLET ORAL at 20:46

## 2024-06-14 RX ADMIN — AMPICILLIN SODIUM AND SULBACTAM SODIUM 3 G: 2; 1 INJECTION, POWDER, FOR SOLUTION INTRAMUSCULAR; INTRAVENOUS at 13:07

## 2024-06-14 RX ADMIN — FLUTICASONE FUROATE AND VILANTEROL TRIFENATATE 1 PUFF: 100; 25 POWDER RESPIRATORY (INHALATION) at 07:02

## 2024-06-14 RX ADMIN — Medication 5 MG: at 20:46

## 2024-06-14 RX ADMIN — MONTELUKAST 10 MG: 10 TABLET, FILM COATED ORAL at 20:45

## 2024-06-14 RX ADMIN — METOPROLOL TARTRATE 12.5 MG: 25 TABLET, FILM COATED ORAL at 20:46

## 2024-06-14 RX ADMIN — IPRATROPIUM BROMIDE AND ALBUTEROL SULFATE 3 ML: .5; 3 SOLUTION RESPIRATORY (INHALATION) at 18:52

## 2024-06-14 RX ADMIN — RISPERIDONE 0.5 MG: 1 TABLET, FILM COATED ORAL at 09:55

## 2024-06-14 RX ADMIN — METHYLPREDNISOLONE SODIUM SUCCINATE 40 MG: 40 INJECTION, POWDER, FOR SOLUTION INTRAMUSCULAR; INTRAVENOUS at 13:07

## 2024-06-14 RX ADMIN — AMPICILLIN SODIUM AND SULBACTAM SODIUM 3 G: 2; 1 INJECTION, POWDER, FOR SOLUTION INTRAMUSCULAR; INTRAVENOUS at 05:54

## 2024-06-14 RX ADMIN — AMPICILLIN SODIUM AND SULBACTAM SODIUM 3 G: 2; 1 INJECTION, POWDER, FOR SOLUTION INTRAMUSCULAR; INTRAVENOUS at 18:10

## 2024-06-14 RX ADMIN — METOPROLOL TARTRATE 12.5 MG: 25 TABLET, FILM COATED ORAL at 09:56

## 2024-06-14 RX ADMIN — LIDOCAINE 1 PATCH: 4 PATCH TOPICAL at 09:55

## 2024-06-14 RX ADMIN — ENOXAPARIN SODIUM 40 MG: 40 INJECTION SUBCUTANEOUS at 13:07

## 2024-06-14 RX ADMIN — RISPERIDONE 0.5 MG: 1 TABLET, FILM COATED ORAL at 14:17

## 2024-06-14 RX ADMIN — RISPERIDONE 3.5 MG: 1 TABLET, FILM COATED ORAL at 20:45

## 2024-06-14 RX ADMIN — POLYETHYLENE GLYCOL 3350 17 G: 17 POWDER, FOR SOLUTION ORAL at 09:57

## 2024-06-14 RX ADMIN — IPRATROPIUM BROMIDE AND ALBUTEROL SULFATE 3 ML: .5; 3 SOLUTION RESPIRATORY (INHALATION) at 12:37

## 2024-06-14 RX ADMIN — AMPICILLIN SODIUM AND SULBACTAM SODIUM 3 G: 2; 1 INJECTION, POWDER, FOR SOLUTION INTRAMUSCULAR; INTRAVENOUS at 23:39

## 2024-06-14 RX ADMIN — FLUTICASONE PROPIONATE 2 SPRAY: 50 SPRAY, METERED NASAL at 09:58

## 2024-06-14 ASSESSMENT — COGNITIVE AND FUNCTIONAL STATUS - GENERAL
WALKING IN HOSPITAL ROOM: TOTAL
TURNING FROM BACK TO SIDE WHILE IN FLAT BAD: A LOT
MOBILITY SCORE: 9
MOBILITY SCORE: 9
MOVING FROM LYING ON BACK TO SITTING ON SIDE OF FLAT BED WITH BEDRAILS: A LOT
STANDING UP FROM CHAIR USING ARMS: TOTAL
TURNING FROM BACK TO SIDE WHILE IN FLAT BAD: A LOT
CLIMB 3 TO 5 STEPS WITH RAILING: TOTAL
DAILY ACTIVITIY SCORE: 6
MOVING FROM LYING ON BACK TO SITTING ON SIDE OF FLAT BED WITH BEDRAILS: A LOT
DRESSING REGULAR UPPER BODY CLOTHING: TOTAL
DRESSING REGULAR LOWER BODY CLOTHING: TOTAL
MOVING TO AND FROM BED TO CHAIR: A LOT
WALKING IN HOSPITAL ROOM: TOTAL
MOVING TO AND FROM BED TO CHAIR: A LOT
PERSONAL GROOMING: TOTAL
TOILETING: TOTAL
HELP NEEDED FOR BATHING: TOTAL
CLIMB 3 TO 5 STEPS WITH RAILING: TOTAL
EATING MEALS: TOTAL
STANDING UP FROM CHAIR USING ARMS: TOTAL

## 2024-06-14 ASSESSMENT — PAIN SCALES - GENERAL
PAINLEVEL_OUTOF10: 0 - NO PAIN
PAINLEVEL_OUTOF10: 0 - NO PAIN

## 2024-06-14 NOTE — PROGRESS NOTES
Called and spoke with pt's sister Clarisa ,dc planning discussed, sister aware of possible dc  24-48 hrs.   Plan is to return to [facility].  IMM was explained and verbal consent obtained.  Cathi Tubbs RN T[facility]

## 2024-06-14 NOTE — CARE PLAN
The patient's goals for the shift include      The clinical goals for the shift include Patient will be able to maintain 02 saturation above 92%      Problem: Pain  Goal: My pain/discomfort is manageable  Outcome: Progressing     Problem: Daily Care  Goal: Daily care needs are met  Outcome: Progressing

## 2024-06-14 NOTE — CARE PLAN
The patient's goals for the shift include      The clinical goals for the shift include maintain safety      Problem: Pain  Goal: My pain/discomfort is manageable  Outcome: Progressing     Problem: Safety  Goal: Patient will be injury free during hospitalization  Outcome: Progressing  Goal: I will remain free of falls  Outcome: Progressing     Problem: Psychosocial Needs  Goal: Demonstrates ability to cope with hospitalization/illness  Outcome: Progressing  Goal: Collaborate with me, my family, and caregiver to identify my specific goals  Outcome: Progressing     Problem: Respiratory  Goal: Minimal/no exertional discomfort or dyspnea this shift  Outcome: Progressing  Goal: No signs of respiratory distress (eg. Use of accessory muscles. Peds grunting)  Outcome: Progressing  Goal: Patent airway maintained this shift  Outcome: Progressing  Goal: Verbalize decreased shortness of breath this shift  Outcome: Progressing      Placed a call to Dr. GUSTAVO Sánchez, ENT answering service at [phone], spoke with Anisa. AM consult scheduled.

## 2024-06-14 NOTE — PROGRESS NOTES
"Anisha Quevedo is a 28 y.o. female on day 6 of admission presenting with Shortness of breath.    Subjective      Patient is awake and alert.  Patient feels well.       Objective     Physical Exam    Head and face no deformities  Oropharynx normal mucosa  Neck is supple no thyromegaly  Chest is symmetric no crackles wheezing  Heart is regular no murmurs  Abdomen is soft and nontender  Skin is intact  Joints are normal  Neurologically patient is moving all 4 limbs    Last Recorded Vitals  Blood pressure 104/53, pulse 99, temperature 35.3 °C (95.5 °F), temperature source Temporal, resp. rate 18, height 1.473 m (4' 10\"), weight 77.1 kg (170 lb), SpO2 95%.  Intake/Output last 3 Shifts:  I/O last 3 completed shifts:  In: 200 (2.6 mL/kg) [IV Piggyback:200]  Out: 1600 (20.7 mL/kg) [Urine:1600 (0.6 mL/kg/hr)]  Weight: 77.1 kg                       Assessment/Plan      Asthma exacerbation which is getting better currently patient does not have any wheezing.  Mild hypoxia currently on 2 L/min oxygen nasal cannula.  Enlarged adenoids in the neck currently no stridor and no symptoms.    Plan:  Add CoughAssist technique per chest physio to assist with bringing up secretions  Steroids can be discontinued.  Continue bronchodilators and inhaled steroids.  Wean off oxygen for saturation between 89 and 95%.  DVT prophylaxis.      Susan Fink MD  PGY1 pulmonology      "

## 2024-06-14 NOTE — PROGRESS NOTES
Anisha Quevedo is a 28 y.o. female on day 6 of admission presenting with Shortness of breath.      Subjective   Patient seen and examined by me.  She is resting in bed.  Patient remains on 2 L of nasal cannula oxygen which was increased earlier due to mild hypoxia.  Patient appears in no acute respiratory distress and she is not tachycardic or tachypneic.  She is afebrile.  Patient is clinically improving.  She has had swallowing evaluation and also seen by GI and no need for further barium swallows as she has had modified barium swallows x 2 which were normal.  I did request ENT consultation and got a chat message from Dr. Sánchez that as patient is not in any respiratory distress or stridor she can follow-up with ENT as an outpatient.  Vitals and labs noted.       Objective     Last Recorded Vitals  /57   Pulse 89   Temp 36 °C (96.8 °F)   Resp 18   Wt 77.1 kg (170 lb)   SpO2 93%   Intake/Output last 3 Shifts:    Intake/Output Summary (Last 24 hours) at 6/14/2024 1800  Last data filed at 6/14/2024 1100  Gross per 24 hour   Intake --   Output 900 ml   Net -900 ml       Admission Weight  Weight: 77.1 kg (170 lb) (06/07/24 2035)    Daily Weight  06/07/24 : 77.1 kg (170 lb)    Image Results  XR chest 2 views  Narrative: Interpreted By:  Marino James,   STUDY:  XR CHEST 2 VIEWS;  6/12/2024 6:18 pm      INDICATION:  Signs/Symptoms:sob.      COMPARISON:  None.      ACCESSION NUMBER(S):  [id number]      ORDERING CLINICIAN:  NORMA BENAVIDES      FINDINGS:                  CARDIOMEDIASTINAL SILHOUETTE:  Cardiomediastinal silhouette is normal in size and configuration.      LUNGS:  Trace left basal atelectasis. No major pulmonary infiltrate or  pleural effusion. No pneumothorax.      ABDOMEN:  Gas distended bowel loops/stomach in the left upper quadrant.      BONES:  Posterior spinal rods extending throughout the thoracic spine      Impression: 1. Trace left basal atelectasis.  2. Gas distended left upper quadrant  structures either stomach or  colonic loops.              MACRO:  None      Signed by: Marino Jacob 6/13/2024 8:19 AM  Dictation workstation:   [id number]      Physical Exam  Vitals and nursing note reviewed.   Constitutional:       General: She is not in acute distress.     Appearance: Normal appearance. She is normal weight. She is not ill-appearing or toxic-appearing.   HENT:      Head: Normocephalic and atraumatic.      Right Ear: Tympanic membrane and ear canal normal. There is no impacted cerumen.      Left Ear: Tympanic membrane, ear canal and external ear normal.      Nose: Nose normal. No congestion or rhinorrhea.      Mouth/Throat:      Pharynx: Oropharynx is clear. No oropharyngeal exudate or posterior oropharyngeal erythema.   Eyes:      General: No scleral icterus.        Right eye: No discharge.         Left eye: No discharge.      Extraocular Movements: Extraocular movements intact.      Conjunctiva/sclera: Conjunctivae normal.      Pupils: Pupils are equal, round, and reactive to light.   Neck:      Vascular: No carotid bruit.   Cardiovascular:      Rate and Rhythm: Normal rate and regular rhythm.      Pulses: Normal pulses.      Heart sounds: Normal heart sounds. No murmur heard.     No gallop.   Pulmonary:      Effort: Pulmonary effort is normal. No respiratory distress.      Breath sounds: Normal breath sounds. No wheezing, rhonchi or rales.      Comments: Lung exam/no wheezing rales or rhonchi but patient has pooling of secretions in her oropharynx.  Abdominal:      General: Abdomen is flat. Bowel sounds are normal. There is no distension.      Palpations: Abdomen is soft. There is no mass.      Tenderness: There is no abdominal tenderness. There is no right CVA tenderness, left CVA tenderness, guarding or rebound.      Hernia: No hernia is present.   Musculoskeletal:         General: No swelling or tenderness. Normal range of motion.      Cervical back: Normal range of motion and neck supple.  No rigidity.      Right lower leg: No edema.      Left lower leg: No edema.   Lymphadenopathy:      Cervical: No cervical adenopathy.   Skin:     General: Skin is warm.      Coloration: Skin is not jaundiced.      Findings: No erythema or rash.   Neurological:      General: No focal deficit present.      Mental Status: She is alert and oriented to person, place, and time. Mental status is at baseline.      Sensory: No sensory deficit.      Motor: No weakness.      Gait: Gait normal.      Deep Tendon Reflexes: Reflexes normal.   Psychiatric:         Mood and Affect: Mood normal.         Thought Content: Thought content normal.         Judgment: Judgment normal.         Relevant Results               Assessment/Plan   This patient currently has cardiac telemetry ordered; if you would like to modify or discontinue the telemetry order, click here to go to the orders activity to modify/discontinue the order.              Principal Problem:    Shortness of breath    #1/acute exacerbation of asthmatic bronchitis/mild hypoxia/clinically slowly improving.  Wheezing has much improved.  Chest x-ray done 2 days ago was negative.  Continue present treatment and patient is being followed by pulmonary team.  Continue bronchodilators and inhaled steroids.  They have advised that steroids IV can be discontinued.  Plan on discontinuing Solu-Medrol in AM.  Patient is requiring 2 L of nasal cannula oxygen today.  Also chest physiotherapy to assist with bringing up secretions.  2./History of cerebral palsy/psychosis/schizoaffective disorder and patient remains stable on her current medications.  3./S/p sinus tachycardia and patient remains on metoprolol 12.5 mg p.o. daily.  She was seen By cardiology due to atypical chest pain and EGD EKG was negative and troponin was negative and cardiology advised no further workup needed at present time.  She has had no further chest pain.  4./Constipation which has resolved and patient remains on  MiraLAX twice daily and stool softeners as needed.  #5/enlarged adenoids seen on CT scan of the neck on admission.  Patient is not in any respiratory distress.  Discussed with Dr. Sánchez ENT on chart message that he prefers seeing her as an outpatient as patient.  Will advise follow-up with ENT as an outpatient.   #5/possible discharge in the next 24 to 48 hours.  If patient is not weanable off the oxygen she may require ECF placement and discussed with the nursing to get social service consult and further evaluation for discharge planning.  Medications reconciled.  Total time spent 22 minutes.       Charles Mead MD       stated
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
## 2024-06-15 LAB
ALBUMIN SERPL BCP-MCNC: 3.7 G/DL (ref 3.4–5)
ALP SERPL-CCNC: 49 U/L (ref 33–110)
ALT SERPL W P-5'-P-CCNC: 24 U/L (ref 7–45)
ANION GAP SERPL CALC-SCNC: 13 MMOL/L (ref 10–20)
AST SERPL W P-5'-P-CCNC: 14 U/L (ref 9–39)
BILIRUB SERPL-MCNC: 0.3 MG/DL (ref 0–1.2)
BUN SERPL-MCNC: 15 MG/DL (ref 6–23)
CALCIUM SERPL-MCNC: 9.1 MG/DL (ref 8.6–10.3)
CHLORIDE SERPL-SCNC: 100 MMOL/L (ref 98–107)
CO2 SERPL-SCNC: 27 MMOL/L (ref 21–32)
CREAT SERPL-MCNC: 0.44 MG/DL (ref 0.5–1.05)
EGFRCR SERPLBLD CKD-EPI 2021: >90 ML/MIN/1.73M*2
GLUCOSE SERPL-MCNC: 181 MG/DL (ref 74–99)
POTASSIUM SERPL-SCNC: 4.7 MMOL/L (ref 3.5–5.3)
PROT SERPL-MCNC: 6.6 G/DL (ref 6.4–8.2)
SODIUM SERPL-SCNC: 135 MMOL/L (ref 136–145)

## 2024-06-15 PROCEDURE — 2500000001 HC RX 250 WO HCPCS SELF ADMINISTERED DRUGS (ALT 637 FOR MEDICARE OP): Performed by: STUDENT IN AN ORGANIZED HEALTH CARE EDUCATION/TRAINING PROGRAM

## 2024-06-15 PROCEDURE — 94640 AIRWAY INHALATION TREATMENT: CPT | Mod: MUE

## 2024-06-15 PROCEDURE — 36415 COLL VENOUS BLD VENIPUNCTURE: CPT | Performed by: INTERNAL MEDICINE

## 2024-06-15 PROCEDURE — 2500000004 HC RX 250 GENERAL PHARMACY W/ HCPCS (ALT 636 FOR OP/ED): Performed by: STUDENT IN AN ORGANIZED HEALTH CARE EDUCATION/TRAINING PROGRAM

## 2024-06-15 PROCEDURE — 80053 COMPREHEN METABOLIC PANEL: CPT | Performed by: INTERNAL MEDICINE

## 2024-06-15 PROCEDURE — 2500000002 HC RX 250 W HCPCS SELF ADMINISTERED DRUGS (ALT 637 FOR MEDICARE OP, ALT 636 FOR OP/ED): Mod: MUE | Performed by: INTERNAL MEDICINE

## 2024-06-15 PROCEDURE — 1200000002 HC GENERAL ROOM WITH TELEMETRY DAILY

## 2024-06-15 PROCEDURE — 94668 MNPJ CHEST WALL SBSQ: CPT

## 2024-06-15 PROCEDURE — 2500000004 HC RX 250 GENERAL PHARMACY W/ HCPCS (ALT 636 FOR OP/ED): Performed by: INTERNAL MEDICINE

## 2024-06-15 PROCEDURE — 2500000004 HC RX 250 GENERAL PHARMACY W/ HCPCS (ALT 636 FOR OP/ED): Performed by: NURSE PRACTITIONER

## 2024-06-15 PROCEDURE — 2500000002 HC RX 250 W HCPCS SELF ADMINISTERED DRUGS (ALT 637 FOR MEDICARE OP, ALT 636 FOR OP/ED): Performed by: STUDENT IN AN ORGANIZED HEALTH CARE EDUCATION/TRAINING PROGRAM

## 2024-06-15 RX ORDER — PREDNISONE 20 MG/1
20 TABLET ORAL DAILY
Status: DISCONTINUED | OUTPATIENT
Start: 2024-06-15 | End: 2024-06-17

## 2024-06-15 RX ADMIN — IPRATROPIUM BROMIDE AND ALBUTEROL SULFATE 3 ML: .5; 3 SOLUTION RESPIRATORY (INHALATION) at 13:34

## 2024-06-15 RX ADMIN — METHYLPREDNISOLONE SODIUM SUCCINATE 40 MG: 40 INJECTION, POWDER, FOR SOLUTION INTRAMUSCULAR; INTRAVENOUS at 12:03

## 2024-06-15 RX ADMIN — POLYETHYLENE GLYCOL 3350 17 G: 17 POWDER, FOR SOLUTION ORAL at 20:52

## 2024-06-15 RX ADMIN — RISPERIDONE 3.5 MG: 1 TABLET, FILM COATED ORAL at 20:55

## 2024-06-15 RX ADMIN — PANTOPRAZOLE SODIUM 40 MG: 40 TABLET, DELAYED RELEASE ORAL at 06:08

## 2024-06-15 RX ADMIN — AMPICILLIN SODIUM AND SULBACTAM SODIUM 3 G: 2; 1 INJECTION, POWDER, FOR SOLUTION INTRAMUSCULAR; INTRAVENOUS at 18:22

## 2024-06-15 RX ADMIN — MONTELUKAST 10 MG: 10 TABLET, FILM COATED ORAL at 20:53

## 2024-06-15 RX ADMIN — RISPERIDONE 0.5 MG: 1 TABLET, FILM COATED ORAL at 09:11

## 2024-06-15 RX ADMIN — FLUTICASONE FUROATE AND VILANTEROL TRIFENATATE 1 PUFF: 100; 25 POWDER RESPIRATORY (INHALATION) at 07:24

## 2024-06-15 RX ADMIN — AMPICILLIN SODIUM AND SULBACTAM SODIUM 3 G: 2; 1 INJECTION, POWDER, FOR SOLUTION INTRAMUSCULAR; INTRAVENOUS at 12:06

## 2024-06-15 RX ADMIN — AMPICILLIN SODIUM AND SULBACTAM SODIUM 3 G: 2; 1 INJECTION, POWDER, FOR SOLUTION INTRAMUSCULAR; INTRAVENOUS at 06:08

## 2024-06-15 RX ADMIN — METOPROLOL TARTRATE 12.5 MG: 25 TABLET, FILM COATED ORAL at 20:53

## 2024-06-15 RX ADMIN — FLUTICASONE PROPIONATE 2 SPRAY: 50 SPRAY, METERED NASAL at 09:10

## 2024-06-15 RX ADMIN — Medication 5 MG: at 20:53

## 2024-06-15 RX ADMIN — RISPERIDONE 0.5 MG: 1 TABLET, FILM COATED ORAL at 15:45

## 2024-06-15 RX ADMIN — IPRATROPIUM BROMIDE AND ALBUTEROL SULFATE 3 ML: .5; 3 SOLUTION RESPIRATORY (INHALATION) at 07:06

## 2024-06-15 RX ADMIN — IPRATROPIUM BROMIDE AND ALBUTEROL SULFATE 3 ML: .5; 3 SOLUTION RESPIRATORY (INHALATION) at 18:23

## 2024-06-15 RX ADMIN — METOPROLOL TARTRATE 12.5 MG: 25 TABLET, FILM COATED ORAL at 09:11

## 2024-06-15 RX ADMIN — ENOXAPARIN SODIUM 40 MG: 40 INJECTION SUBCUTANEOUS at 12:05

## 2024-06-15 RX ADMIN — HYDROXYZINE HYDROCHLORIDE 50 MG: 25 TABLET ORAL at 20:53

## 2024-06-15 NOTE — CARE PLAN
Problem: Skin  Goal: Prevent/minimize sheer/friction injuries  6/14/2024 2120 by Guillermo Kunz RN  Flowsheets (Taken 6/14/2024 2120)  Prevent/minimize sheer/friction injuries: Turn/reposition every 2 hours/use positioning/transfer devices  6/14/2024 2018 by Guillermo Kunz, RN  Outcome: Progressing   The patient's goals for the shift include      The clinical goals for the shift include maintain hemodynamic stability

## 2024-06-15 NOTE — CARE PLAN
The patient's goals for the shift include      The clinical goals for the shift include p.ox wnl    Problem: Pain  Goal: My pain/discomfort is manageable  Outcome: Progressing     Problem: Safety  Goal: Patient will be injury free during hospitalization  Outcome: Progressing  Goal: I will remain free of falls  Outcome: Progressing     Problem: Daily Care  Goal: Daily care needs are met  Outcome: Progressing     Problem: Psychosocial Needs  Goal: Demonstrates ability to cope with hospitalization/illness  Outcome: Progressing  Goal: Collaborate with me, my family, and caregiver to identify my specific goals  Outcome: Progressing     Problem: Discharge Barriers  Goal: My discharge needs are met  Outcome: Progressing     Problem: Skin  Goal: Participates in plan/prevention/treatment measures  Outcome: Progressing  Goal: Prevent/manage excess moisture  Outcome: Progressing  Goal: Prevent/minimize sheer/friction injuries  Outcome: Progressing  Goal: Promote/optimize nutrition  Outcome: Progressing     Problem: Respiratory  Goal: Minimal/no exertional discomfort or dyspnea this shift  Outcome: Progressing  Goal: No signs of respiratory distress (eg. Use of accessory muscles. Peds grunting)  Outcome: Progressing  Goal: Patent airway maintained this shift  Outcome: Progressing  Goal: Verbalize decreased shortness of breath this shift  Outcome: Progressing
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
## 2024-06-15 NOTE — PROGRESS NOTES
Anisha Quevedo is a 28 y.o. female on day 7 of admission presenting with Shortness of breath.      Subjective   Patient seen and examined by me.  Patient is resting in bed and as per nursing her oxygen was increased to 3 L nasal cannula this morning.  She is pulse oxing 94%.  She has on and off pulling of upper airway secretions and chest physiotherapy and CoughAssist has been ordered.  Patient is afebrile.  As per nursing she has no difficulty with swallowing.  Vitals noted.  Patient has had no further chest pain.  She appears comfortable she is awake alert oriented x 3.       Objective     Last Recorded Vitals  /57 (BP Location: Right arm, Patient Position: Lying)   Pulse 95   Temp 36.1 °C (97 °F) (Temporal)   Resp 18   Wt 77.1 kg (170 lb)   SpO2 94%   Intake/Output last 3 Shifts:    Intake/Output Summary (Last 24 hours) at 6/15/2024 1756  Last data filed at 6/15/2024 1532  Gross per 24 hour   Intake 1270 ml   Output 2550 ml   Net -1280 ml       Admission Weight  Weight: 77.1 kg (170 lb) (06/07/24 2035)    Daily Weight  06/07/24 : 77.1 kg (170 lb)    Image Results  XR chest 2 views  Narrative: Interpreted By:  Marino James,   STUDY:  XR CHEST 2 VIEWS;  6/12/2024 6:18 pm      INDICATION:  Signs/Symptoms:sob.      COMPARISON:  None.      ACCESSION NUMBER(S):  [id number]      ORDERING CLINICIAN:  NORMA BENAVIDES      FINDINGS:                  CARDIOMEDIASTINAL SILHOUETTE:  Cardiomediastinal silhouette is normal in size and configuration.      LUNGS:  Trace left basal atelectasis. No major pulmonary infiltrate or  pleural effusion. No pneumothorax.      ABDOMEN:  Gas distended bowel loops/stomach in the left upper quadrant.      BONES:  Posterior spinal rods extending throughout the thoracic spine      Impression: 1. Trace left basal atelectasis.  2. Gas distended left upper quadrant structures either stomach or  colonic loops.              MACRO:  None      Signed by: Marino James 6/13/2024 8:19  AM  Dictation workstation:   [id number]      Physical Exam  Vitals and nursing note reviewed.   Constitutional:       General: She is not in acute distress.     Appearance: Normal appearance. She is normal weight. She is not ill-appearing or toxic-appearing.   HENT:      Head: Normocephalic and atraumatic.      Right Ear: Tympanic membrane and ear canal normal. There is no impacted cerumen.      Left Ear: Tympanic membrane, ear canal and external ear normal.      Nose: Nose normal. No congestion or rhinorrhea.      Mouth/Throat:      Pharynx: Oropharynx is clear. No oropharyngeal exudate or posterior oropharyngeal erythema.   Eyes:      General: No scleral icterus.        Right eye: No discharge.         Left eye: No discharge.      Extraocular Movements: Extraocular movements intact.      Conjunctiva/sclera: Conjunctivae normal.      Pupils: Pupils are equal, round, and reactive to light.   Neck:      Vascular: No carotid bruit.   Cardiovascular:      Rate and Rhythm: Normal rate and regular rhythm.      Pulses: Normal pulses.      Heart sounds: Normal heart sounds. No murmur heard.     No gallop.   Pulmonary:      Effort: Pulmonary effort is normal. No respiratory distress.      Breath sounds: Normal breath sounds. No wheezing, rhonchi or rales.   Abdominal:      General: Abdomen is flat. Bowel sounds are normal. There is no distension.      Palpations: Abdomen is soft. There is no mass.      Tenderness: There is no abdominal tenderness. There is no right CVA tenderness, left CVA tenderness, guarding or rebound.      Hernia: No hernia is present.   Musculoskeletal:         General: No swelling or tenderness. Normal range of motion.      Cervical back: Normal range of motion and neck supple. No rigidity.      Right lower leg: No edema.      Left lower leg: No edema.   Lymphadenopathy:      Cervical: No cervical adenopathy.   Skin:     General: Skin is warm.      Coloration: Skin is not jaundiced.      Findings:  No erythema or rash.   Neurological:      General: No focal deficit present.      Mental Status: She is alert and oriented to person, place, and time. Mental status is at baseline.      Sensory: No sensory deficit.   Psychiatric:         Mood and Affect: Mood normal.         Relevant Results             Assessment/Plan   This patient currently has cardiac telemetry ordered; if you would like to modify or discontinue the telemetry order, click here to go to the orders activity to modify/discontinue the order.    #1/Acute exacerbation of asthma/hypoxia clinically improving.  Patient has no bilateral wheezing today.  She is still requiring 3 L of oxygen by nasal cannula.  She remains on IV Solu-Medrol and this will be discontinued.  P.o. prednisone 20 mg p.o. daily will be started with a tapering dose after that on discharge.  Patient remains on aerosol treatments.  Plan on weaning off the oxygen.  If patient continues to require oxygen early next week discussed with nursing and social service will be contacted and reconsulted to assess her for SNF placement.  Patient also remains on Flonase.  Continue CoughAssist and chest physiotherapy for assisting with upper airway secretions.  #2/enlarged adenoids seen on CT of the neck on admission.  ENT was notified and Dr. Sánchez chart messaged me that he would prefer to see her as an outpatient in the office as patient has no respiratory stridor and she has no trouble with eating food.  She has had swallowing eval and see if speech therapy has also seen the patient.  Patient had normal modified barium swallows x 2 in the recent past.  Patient was also seen by GI.    #3/history of cerebral valve disease/quadriplegia/history of anxiety and schizoaffective disorder/patient remains stable on current dose of Risperdal.  #4/DVT prophylaxis/patient remains on subcu Lovenox.  5./Atypical chest pain which has resolved.  Troponins were negative and EKG was normal sinus rhythm.  Sinus  tachycardia which is improved and patient remains on metoprolol XL 12.5 mg p.o. daily.  Patient was seen by cardiology and no further cardiac testing was advised.  Patient has had no further chest pain.  Medications reconciled.  Pulmonary note reviewed.  IV antibiotics and IV steroids discontinued.  Plan discharge early next week.    Total time spent 22 minutes/time spent on patient interaction/assessment and plan and documentation  Principal Problem:    Shortness of breath                  Charles Mead MD

## 2024-06-16 VITALS
TEMPERATURE: 96.3 F | OXYGEN SATURATION: 94 % | HEIGHT: 58 IN | BODY MASS INDEX: 35.68 KG/M2 | RESPIRATION RATE: 16 BRPM | HEART RATE: 88 BPM | DIASTOLIC BLOOD PRESSURE: 60 MMHG | WEIGHT: 170 LBS | SYSTOLIC BLOOD PRESSURE: 136 MMHG

## 2024-06-16 LAB
ANION GAP SERPL CALC-SCNC: 10 MMOL/L (ref 10–20)
BUN SERPL-MCNC: 13 MG/DL (ref 6–23)
CALCIUM SERPL-MCNC: 8.7 MG/DL (ref 8.6–10.3)
CHLORIDE SERPL-SCNC: 101 MMOL/L (ref 98–107)
CO2 SERPL-SCNC: 30 MMOL/L (ref 21–32)
CREAT SERPL-MCNC: 0.47 MG/DL (ref 0.5–1.05)
EGFRCR SERPLBLD CKD-EPI 2021: >90 ML/MIN/1.73M*2
ERYTHROCYTE [DISTWIDTH] IN BLOOD BY AUTOMATED COUNT: 13.7 % (ref 11.5–14.5)
GLUCOSE SERPL-MCNC: 84 MG/DL (ref 74–99)
HCT VFR BLD AUTO: 40.5 % (ref 36–46)
HGB BLD-MCNC: 13.3 G/DL (ref 12–16)
MCH RBC QN AUTO: 30.3 PG (ref 26–34)
MCHC RBC AUTO-ENTMCNC: 32.8 G/DL (ref 32–36)
MCV RBC AUTO: 92 FL (ref 80–100)
NRBC BLD-RTO: 0 /100 WBCS (ref 0–0)
PLATELET # BLD AUTO: 268 X10*3/UL (ref 150–450)
POTASSIUM SERPL-SCNC: 4.4 MMOL/L (ref 3.5–5.3)
RBC # BLD AUTO: 4.39 X10*6/UL (ref 4–5.2)
SODIUM SERPL-SCNC: 137 MMOL/L (ref 136–145)
WBC # BLD AUTO: 11.9 X10*3/UL (ref 4.4–11.3)

## 2024-06-16 PROCEDURE — 2500000004 HC RX 250 GENERAL PHARMACY W/ HCPCS (ALT 636 FOR OP/ED): Performed by: INTERNAL MEDICINE

## 2024-06-16 PROCEDURE — 85027 COMPLETE CBC AUTOMATED: CPT | Performed by: INTERNAL MEDICINE

## 2024-06-16 PROCEDURE — 36415 COLL VENOUS BLD VENIPUNCTURE: CPT | Performed by: INTERNAL MEDICINE

## 2024-06-16 PROCEDURE — 2500000002 HC RX 250 W HCPCS SELF ADMINISTERED DRUGS (ALT 637 FOR MEDICARE OP, ALT 636 FOR OP/ED): Performed by: STUDENT IN AN ORGANIZED HEALTH CARE EDUCATION/TRAINING PROGRAM

## 2024-06-16 PROCEDURE — 80048 BASIC METABOLIC PNL TOTAL CA: CPT | Performed by: INTERNAL MEDICINE

## 2024-06-16 PROCEDURE — 2500000005 HC RX 250 GENERAL PHARMACY W/O HCPCS: Performed by: INTERNAL MEDICINE

## 2024-06-16 PROCEDURE — 94640 AIRWAY INHALATION TREATMENT: CPT | Mod: MUE

## 2024-06-16 PROCEDURE — 94668 MNPJ CHEST WALL SBSQ: CPT

## 2024-06-16 PROCEDURE — 2500000001 HC RX 250 WO HCPCS SELF ADMINISTERED DRUGS (ALT 637 FOR MEDICARE OP): Performed by: STUDENT IN AN ORGANIZED HEALTH CARE EDUCATION/TRAINING PROGRAM

## 2024-06-16 PROCEDURE — 1200000002 HC GENERAL ROOM WITH TELEMETRY DAILY

## 2024-06-16 PROCEDURE — 2500000002 HC RX 250 W HCPCS SELF ADMINISTERED DRUGS (ALT 637 FOR MEDICARE OP, ALT 636 FOR OP/ED): Performed by: INTERNAL MEDICINE

## 2024-06-16 RX ORDER — ACETYLCYSTEINE 200 MG/ML
3 SOLUTION ORAL; RESPIRATORY (INHALATION)
Status: DISCONTINUED | OUTPATIENT
Start: 2024-06-17 | End: 2024-06-18 | Stop reason: HOSPADM

## 2024-06-16 RX ADMIN — ENOXAPARIN SODIUM 40 MG: 40 INJECTION SUBCUTANEOUS at 11:22

## 2024-06-16 RX ADMIN — FLUTICASONE PROPIONATE 2 SPRAY: 50 SPRAY, METERED NASAL at 11:23

## 2024-06-16 RX ADMIN — RISPERIDONE 0.5 MG: 1 TABLET, FILM COATED ORAL at 16:16

## 2024-06-16 RX ADMIN — IPRATROPIUM BROMIDE AND ALBUTEROL SULFATE 3 ML: .5; 3 SOLUTION RESPIRATORY (INHALATION) at 06:39

## 2024-06-16 RX ADMIN — RISPERIDONE 3.5 MG: 1 TABLET, FILM COATED ORAL at 21:36

## 2024-06-16 RX ADMIN — METOPROLOL TARTRATE 12.5 MG: 25 TABLET, FILM COATED ORAL at 21:36

## 2024-06-16 RX ADMIN — METOPROLOL TARTRATE 12.5 MG: 25 TABLET, FILM COATED ORAL at 11:29

## 2024-06-16 RX ADMIN — RISPERIDONE 0.5 MG: 1 TABLET, FILM COATED ORAL at 11:28

## 2024-06-16 RX ADMIN — MONTELUKAST 10 MG: 10 TABLET, FILM COATED ORAL at 21:36

## 2024-06-16 RX ADMIN — PREDNISONE 20 MG: 20 TABLET ORAL at 11:28

## 2024-06-16 RX ADMIN — FLUTICASONE FUROATE AND VILANTEROL TRIFENATATE 1 PUFF: 100; 25 POWDER RESPIRATORY (INHALATION) at 06:51

## 2024-06-16 RX ADMIN — HYDROXYZINE HYDROCHLORIDE 50 MG: 25 TABLET ORAL at 21:36

## 2024-06-16 RX ADMIN — PANTOPRAZOLE SODIUM 40 MG: 40 TABLET, DELAYED RELEASE ORAL at 06:24

## 2024-06-16 RX ADMIN — IPRATROPIUM BROMIDE AND ALBUTEROL SULFATE 3 ML: .5; 3 SOLUTION RESPIRATORY (INHALATION) at 11:58

## 2024-06-16 RX ADMIN — Medication 5 MG: at 21:37

## 2024-06-16 RX ADMIN — IPRATROPIUM BROMIDE AND ALBUTEROL SULFATE 3 ML: .5; 3 SOLUTION RESPIRATORY (INHALATION) at 18:57

## 2024-06-16 ASSESSMENT — COGNITIVE AND FUNCTIONAL STATUS - GENERAL
MOVING FROM LYING ON BACK TO SITTING ON SIDE OF FLAT BED WITH BEDRAILS: TOTAL
TOILETING: TOTAL
MOBILITY SCORE: 6
DRESSING REGULAR UPPER BODY CLOTHING: TOTAL
MOVING FROM LYING ON BACK TO SITTING ON SIDE OF FLAT BED WITH BEDRAILS: TOTAL
STANDING UP FROM CHAIR USING ARMS: TOTAL
DRESSING REGULAR LOWER BODY CLOTHING: TOTAL
DAILY ACTIVITIY SCORE: 6
DRESSING REGULAR UPPER BODY CLOTHING: TOTAL
STANDING UP FROM CHAIR USING ARMS: TOTAL
HELP NEEDED FOR BATHING: TOTAL
EATING MEALS: TOTAL
MOBILITY SCORE: 6
EATING MEALS: TOTAL
DAILY ACTIVITIY SCORE: 6
WALKING IN HOSPITAL ROOM: TOTAL
CLIMB 3 TO 5 STEPS WITH RAILING: TOTAL
WALKING IN HOSPITAL ROOM: TOTAL
HELP NEEDED FOR BATHING: TOTAL
TURNING FROM BACK TO SIDE WHILE IN FLAT BAD: TOTAL
DRESSING REGULAR LOWER BODY CLOTHING: TOTAL
MOVING TO AND FROM BED TO CHAIR: TOTAL
TURNING FROM BACK TO SIDE WHILE IN FLAT BAD: TOTAL
PERSONAL GROOMING: TOTAL
MOVING TO AND FROM BED TO CHAIR: TOTAL
TOILETING: TOTAL
PERSONAL GROOMING: TOTAL
CLIMB 3 TO 5 STEPS WITH RAILING: TOTAL

## 2024-06-16 ASSESSMENT — PAIN SCALES - GENERAL: PAINLEVEL_OUTOF10: 0 - NO PAIN
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
## 2024-06-16 NOTE — CARE PLAN
The patient's goals for the shift include      The clinical goals for the shift include maintain hemodynamic stability        Problem: Pain  Goal: My pain/discomfort is manageable  Outcome: Progressing     Problem: Safety  Goal: Patient will be injury free during hospitalization  Outcome: Progressing     Problem: Daily Care  Goal: Daily care needs are met  Outcome: Progressing     Problem: Psychosocial Needs  Goal: Demonstrates ability to cope with hospitalization/illness  Outcome: Progressing

## 2024-06-16 NOTE — PROGRESS NOTES
Anisha Quevedo is a 28 y.o. female on day 8 of admission presenting with Shortness of breath.      Subjective   Patient seen and examined by me.  Patient is resting in bed and remains on 2 L nasal cannula oxygen.  Patient still has a cough and has minimal wheezing.  Patient is afebrile.  Blood pressure is stable.  Heart rate is 102.   Vitals and labs noted.    Objective     Last Recorded Vitals  /55 (BP Location: Right arm, Patient Position: Sitting)   Pulse 102   Temp 36.3 °C (97.3 °F) (Temporal)   Resp 18   Wt 77.1 kg (170 lb)   SpO2 93%   Intake/Output last 3 Shifts:    Intake/Output Summary (Last 24 hours) at 6/16/2024 1812  Last data filed at 6/16/2024 1748  Gross per 24 hour   Intake 1560 ml   Output 1050 ml   Net 510 ml       Admission Weight  Weight: 77.1 kg (170 lb) (06/07/24 2035)    Daily Weight  06/07/24 : 77.1 kg (170 lb)    Image Results  XR chest 2 views  Narrative: Interpreted By:  Marino James,   STUDY:  XR CHEST 2 VIEWS;  6/12/2024 6:18 pm      INDICATION:  Signs/Symptoms:sob.      COMPARISON:  None.      ACCESSION NUMBER(S):  [id number]      ORDERING CLINICIAN:  NORMA BENAVIDES      FINDINGS:                  CARDIOMEDIASTINAL SILHOUETTE:  Cardiomediastinal silhouette is normal in size and configuration.      LUNGS:  Trace left basal atelectasis. No major pulmonary infiltrate or  pleural effusion. No pneumothorax.      ABDOMEN:  Gas distended bowel loops/stomach in the left upper quadrant.      BONES:  Posterior spinal rods extending throughout the thoracic spine      Impression: 1. Trace left basal atelectasis.  2. Gas distended left upper quadrant structures either stomach or  colonic loops.              MACRO:  None      Signed by: Marino James 6/13/2024 8:19 AM  Dictation workstation:   [id number]      Physical Exam  Vitals and nursing note reviewed.   Constitutional:       General: She is not in acute distress.     Appearance: Normal appearance. She is normal weight. She is not  ill-appearing or toxic-appearing.   HENT:      Head: Normocephalic and atraumatic.      Right Ear: Tympanic membrane and ear canal normal. There is no impacted cerumen.      Left Ear: Tympanic membrane, ear canal and external ear normal.      Nose: Nose normal. No congestion or rhinorrhea.      Mouth/Throat:      Pharynx: Oropharynx is clear. No oropharyngeal exudate or posterior oropharyngeal erythema.   Eyes:      General: No scleral icterus.        Right eye: No discharge.         Left eye: No discharge.      Extraocular Movements: Extraocular movements intact.      Conjunctiva/sclera: Conjunctivae normal.      Pupils: Pupils are equal, round, and reactive to light.   Neck:      Vascular: No carotid bruit.   Cardiovascular:      Rate and Rhythm: Normal rate and regular rhythm.      Pulses: Normal pulses.      Heart sounds: Normal heart sounds. No murmur heard.     No gallop.   Pulmonary:      Effort: Pulmonary effort is normal. No respiratory distress.      Breath sounds: Wheezing present. No rhonchi or rales.      Comments: Few scattered bibasilar wheezing  Abdominal:      General: Abdomen is flat. Bowel sounds are normal. There is no distension.      Palpations: Abdomen is soft. There is no mass.      Tenderness: There is no abdominal tenderness. There is no right CVA tenderness, left CVA tenderness, guarding or rebound.      Hernia: No hernia is present.   Musculoskeletal:         General: No swelling or tenderness. Normal range of motion.      Cervical back: Normal range of motion and neck supple. No rigidity.      Right lower leg: No edema.      Left lower leg: No edema.   Lymphadenopathy:      Cervical: No cervical adenopathy.   Skin:     General: Skin is warm.      Coloration: Skin is not jaundiced.      Findings: No erythema or rash.   Neurological:      General: No focal deficit present.      Mental Status: She is alert and oriented to person, place, and time. Mental status is at baseline.      Deep  Tendon Reflexes: Reflexes normal.      Comments: History of cerebral palsy and quadriplegia/   Psychiatric:         Mood and Affect: Mood normal.         Thought Content: Thought content normal.         Judgment: Judgment normal.     No rales,    Relevant Results    Current Facility-Administered Medications:     benzonatate (Tessalon) capsule 100 mg, 100 mg, oral, TID PRN, Charles Mead MD, 100 mg at 06/10/24 0842    docusate sodium (Colace) capsule 100 mg, 100 mg, oral, BID PRN, Charles Mead MD    enoxaparin (Lovenox) syringe 40 mg, 40 mg, subcutaneous, q24h, Charles Mead MD, 40 mg at 06/16/24 1122    fluticasone (Flonase) nasal spray 2 spray, 2 spray, Each Nostril, Daily, Lorraine Moraes DO, 2 spray at 06/16/24 1123    fluticasone furoate-vilanteroL (Breo Ellipta) 100-25 mcg/dose inhaler 1 puff, 1 puff, inhalation, Daily, Lorraine Moraes DO, 1 puff at 06/16/24 0651    hydrOXYzine HCL (Atarax) tablet 50 mg, 50 mg, oral, Nightly, Lorraine Moraes DO, 50 mg at 06/15/24 2053    ipratropium-albuteroL (Duo-Neb) 0.5-2.5 mg/3 mL nebulizer solution 3 mL, 3 mL, nebulization, q2h PRN, Charles Mead MD    ipratropium-albuteroL (Duo-Neb) 0.5-2.5 mg/3 mL nebulizer solution 3 mL, 3 mL, nebulization, TID, Charles Mead MD, 3 mL at 06/16/24 1158    lubricating eye drops ophthalmic solution 1 drop, 1 drop, Both Eyes, q8h PRN, Charles Mead MD, 1 drop at 06/12/24 0959    melatonin tablet 5 mg, 5 mg, oral, Nightly, Lorraine Moraes DO, 5 mg at 06/15/24 2053    metoprolol tartrate (Lopressor) tablet 12.5 mg, 12.5 mg, oral, BID, Lorraine Moraes DO, 12.5 mg at 06/16/24 1129    montelukast (Singulair) tablet 10 mg, 10 mg, oral, Nightly, Lorraine Moraes DO, 10 mg at 06/15/24 2053    oxygen (O2) therapy, , inhalation, Continuous PRN - O2/gases, Charles Mead MD, 1 L/min at 06/16/24 1158    pantoprazole (ProtoNix) EC tablet 40 mg, 40 mg, oral, Daily before breakfast, Lorraine Moraes DO, 40 mg at 06/16/24 0624    polyethylene glycol (Glycolax,  Miralax) packet 17 g, 17 g, oral, Daily PRN, Sorylanb Aleisha, DO    polyethylene glycol (Glycolax, Miralax) powder 17 g, 17 g, oral, BID, JESSICA Palacio-CNP, 17 g at 06/15/24 2052    predniSONE (Deltasone) tablet 20 mg, 20 mg, oral, Daily, Charles Mead MD, 20 mg at 06/16/24 1128    racepinephrine (Asthmanefrin) 2.25 % nebulizer solution 0.5 mL, 0.5 mL, nebulization, q3h PRN, Jaime Velazquez MD, 0.5 mL at 06/09/24 0946    risperiDONE (RisperDAL) tablet 0.5 mg, 0.5 mg, oral, BID, Sohaib Aleisha, DO, 0.5 mg at 06/16/24 1616    risperiDONE (RisperDAL) tablet 3.5 mg, 3.5 mg, oral, Nightly, Sohaib Moraes, DO, 3.5 mg at 06/15/24 2055   Results for orders placed or performed during the hospital encounter of 06/07/24 (from the past 24 hour(s))   Comprehensive metabolic panel   Result Value Ref Range    Glucose 181 (H) 74 - 99 mg/dL    Sodium 135 (L) 136 - 145 mmol/L    Potassium 4.7 3.5 - 5.3 mmol/L    Chloride 100 98 - 107 mmol/L    Bicarbonate 27 21 - 32 mmol/L    Anion Gap 13 10 - 20 mmol/L    Urea Nitrogen 15 6 - 23 mg/dL    Creatinine 0.44 (L) 0.50 - 1.05 mg/dL    eGFR >90 >60 mL/min/1.73m*2    Calcium 9.1 8.6 - 10.3 mg/dL    Albumin 3.7 3.4 - 5.0 g/dL    Alkaline Phosphatase 49 33 - 110 U/L    Total Protein 6.6 6.4 - 8.2 g/dL    AST 14 9 - 39 U/L    Bilirubin, Total 0.3 0.0 - 1.2 mg/dL    ALT 24 7 - 45 U/L   CBC   Result Value Ref Range    WBC 11.9 (H) 4.4 - 11.3 x10*3/uL    nRBC 0.0 0.0 - 0.0 /100 WBCs    RBC 4.39 4.00 - 5.20 x10*6/uL    Hemoglobin 13.3 12.0 - 16.0 g/dL    Hematocrit 40.5 36.0 - 46.0 %    MCV 92 80 - 100 fL    MCH 30.3 26.0 - 34.0 pg    MCHC 32.8 32.0 - 36.0 g/dL    RDW 13.7 11.5 - 14.5 %    Platelets 268 150 - 450 x10*3/uL   Basic Metabolic Panel   Result Value Ref Range    Glucose 84 74 - 99 mg/dL    Sodium 137 136 - 145 mmol/L    Potassium 4.4 3.5 - 5.3 mmol/L    Chloride 101 98 - 107 mmol/L    Bicarbonate 30 21 - 32 mmol/L    Anion Gap 10 10 - 20 mmol/L    Urea Nitrogen 13 6 - 23 mg/dL     Creatinine 0.47 (L) 0.50 - 1.05 mg/dL    eGFR >90 >60 mL/min/1.73m*2    Calcium 8.7 8.6 - 10.3 mg/dL    XR chest 2 views    Result Date: 6/13/2024  Interpreted By:  Marino James, STUDY: XR CHEST 2 VIEWS;  6/12/2024 6:18 pm   INDICATION: Signs/Symptoms:sob.   COMPARISON: None.   ACCESSION NUMBER(S): [id number]   ORDERING CLINICIAN: NORMA BENAVIDES   FINDINGS:         CARDIOMEDIASTINAL SILHOUETTE: Cardiomediastinal silhouette is normal in size and configuration.   LUNGS: Trace left basal atelectasis. No major pulmonary infiltrate or pleural effusion. No pneumothorax.   ABDOMEN: Gas distended bowel loops/stomach in the left upper quadrant.   BONES: Posterior spinal rods extending throughout the thoracic spine       1. Trace left basal atelectasis. 2. Gas distended left upper quadrant structures either stomach or colonic loops.       MACRO: None   Signed by: Marino James 6/13/2024 8:19 AM Dictation workstation:   [id number]    Electrocardiogram, 12-lead PRN ACS symptoms    Result Date: 6/12/2024  Normal sinus rhythm with sinus arrhythmia Nonspecific ST abnormality Abnormal ECG Confirmed by Ramicone, James ([id number]) on 6/12/2024 5:12:59 AM    Electrocardiogram, 12-lead PRN ACS symptoms    Result Date: 6/12/2024  Normal sinus rhythm Nonspecific ST and T wave abnormality Abnormal ECG When compared with ECG of 10-CECI-2024 09:53, (unconfirmed) No significant change was found Confirmed by Ramicone, James ([id number]) on 6/12/2024 5:10:44 AM    ECG 12 lead    Result Date: 6/10/2024  Normal sinus rhythm Left ventricular hypertrophy with QRS widening and repolarization abnormality ( R in aVL , William product ) Abnormal ECG    ECG 12 lead    Result Date: 6/10/2024  Sinus tachycardia Nonspecific T wave abnormality Abnormal ECG When compared with ECG of 06-DEC-2023 15:08, No significant change was found    CT soft tissue neck wo IV contrast    Result Date: 6/8/2024  Interpreted By:  Alejandrina Waller, STUDY: CT SOFT TISSUE NECK WO IV  CONTRAST;  6/8/2024 3:42 am   INDICATION: Signs/Symptoms:stridor, eval for upper airway obstruction.   COMPARISON: CT head 06/20/2019   ACCESSION NUMBER(S): [id number]   ORDERING CLINICIAN: MEGAN WHITE   TECHNIQUE: Axial CT images of the neck obtained with no contrast administration. Coronal and sagittal reformats were performed.   FINDINGS:   There is motion artifact.   MUCOSAL SPACES AND CERVICAL SOFT TISSUES: There are mildly enlarged adenoids with mild narrowing of the nasopharynx. No obvious fluid collection.   CERVICAL LYMPH NODES: There are multiple bilateral mildly prominent cervical lymph nodes, not pathologically enlarged by CT criteria.   VISUALIZED INTRACRANIAL STRUCTURES AND ORBITS: Unremarkable.   CENTRAL AIRWAY AND LUNG APICES: There is mild narrowing of nasopharynx. Otherwise, the central airway is patent. The lung apices are clear.   THYROID GLAND: Unremarkable unenhanced appearance.   PAROTID AND SUBMANDIBULAR GLANDS: No adjacent inflammatory changes. No sialolithiasis.   PARANASAL SINUSES: Small amount of mucus at the bilateral sphenoid sinuses. There is mild mucosal thickening at the left maxillary sinus.   OSSEOUS STRUCTURES: Multilevel degenerative changes of the cervical spine with reversal of the cervical lordosis. Partially visualized orthopedic hardware at the upper thoracic spine.       1.  Mildly enlarged adenoids with mild narrowing of the nasopharynx; please correlate for acute adenoiditis. Otherwise, the central airway is patent .       MACRO: None.   Signed by: Alejandrina Waller 6/8/2024 4:05 AM Dictation workstation:   [id number]    CT angio chest for pulmonary embolism    Result Date: 6/8/2024  STUDY: CT Angiogram of the Chest; 6/7/2024 11:56 PM. INDICATION: Tachycardia.  Elevated d-dimer.  Evaluate for pulmonary embolism. COMPARISON: CXR 6/7/2024. ACCESSION NUMBER(S): [id number] ORDERING CLINICIAN: MEGAN WHITE TECHNIQUE:  CTA of the chest was performed with intravenous  contrast. Images are reviewed and processed at a workstation according to the CT angiogram protocol with 3-D and/or MIP post processing imaging generated.  Omnipaque 350 90 mL was administered intravenously. Automated mA/kV exposure control was utilized and patient examination was performed in strict accordance with principles of ALARA. FINDINGS: Examination is significantly degraded by respiratory motion.  As such, there is suboptimal evaluation of the segmental and subsegmental pulmonary arterial branches.  No large central pulmonary embolus is otherwise identified.  The thoracic aorta is normal in course and caliber without dissection or aneurysm. The heart is normal in size without pericardial effusion.  Thoracic lymph nodes are not enlarged. There is no pleural effusion, pleural thickening, or pneumothorax. The airways are patent. Minimal bibasilar atelectasis.  Lungs otherwise clear without distinct consolidation or evidence for pulmonary edema. Upper abdomen demonstrates no acute pathology. There are no acute fractures.  No suspicious bony lesions.  Thoracal lumbar scoliosis again demonstrated with postoperative changes of thoracolumbar fixation with Auguste rods in place.    Suboptimal evaluation of the segmental/subsegmental pulmonary arterial branches due to extensive respiratory motion.  No central pulmonary embolus otherwise identified. No distinct acute intrathoracic process otherwise seen. Signed by Dwayne Weir MD    XR chest 1 view    Result Date: 6/7/2024  STUDY: Chest Radiograph;  06/07/2024 at 9:27 PM INDICATION: Recent diagnosis of pneumonia, presents with increasing shortness of breath. COMPARISON: XR chest 05/03/24, 12/06/23, 06/30/19. ACCESSION NUMBER(S): [id number] ORDERING CLINICIAN: MEGAN WHITE TECHNIQUE:  Frontal chest was obtained at 2127 hours. FINDINGS: CARDIOMEDIASTINAL SILHOUETTE: Cardiomediastinal silhouette is normal in size and configuration.  LUNGS: Lungs are clear.  There is no pneumothorax.  ABDOMEN: No remarkable upper abdominal findings.  Posterior spinal rods extending throughout the thoracic spine.    No detectable active cardiopulmonary disease. Signed by Jonathan Camargo MD           Assessment/Plan   This patient currently has cardiac telemetry ordered; if you would like to modify or discontinue the telemetry order, click here to go to the orders activity to modify/discontinue the order.    #1/acute exacerbation of asthma/hypoxia/patient remains on 2 L of nasal cannula oxygen and remains on tapering dose of p.o. prednisone 20 mg p.o. daily.  Patient is clinically improving.  Patient remains on Breo Ellipta 1 inhalation daily and aerosol treatments every 6 hours as needed.  Will plan on weaning off the oxygen.  2./History of cerebral palsy quadriplegia/pooling of upper airway secretions/enlarged adenoids/patient had speech eval and swallowing evaluation done.  Patient also has had modified barium swallows x 2 in the past which were normal.  Continue to monitor for aspiration and use aspiration precautions while feeding and add CoughAssist technique for chest physiotherapist to assist with bringing up of the secretions.  3./Enlarged adenoids/ENT was consulted and Dr. Sánchez chat messaged me that he would prefer to see her as an outpatient as patient has no stridor.  She will be advised to follow-up with the ENT in 2 to 3 weeks postdischarge.  4./Discharge planning if patient not be able to be weanable off the oxygen she may have to go to [facility] and social service consultation for reevaluation for AMA will be obtained.  5./History of anxiety and patient remained stable on her current dose of Risperdal.  6./DVT prophylaxis patient remains on subcu Lovenox.  Labs and vitals noted.  Medications reconciled.  Continue present level of care.  Total time spent 20 minutes/time spent on patient interaction/assessment and plan and documentation.          Principal Problem:    Shortness of  breath                  Charles Mead MD

## 2024-06-17 LAB
ATRIAL RATE: 127 BPM
ATRIAL RATE: 64 BPM
P AXIS: -11 DEGREES
P AXIS: 52 DEGREES
P OFFSET: 204 MS
P OFFSET: 207 MS
P ONSET: 159 MS
P ONSET: 160 MS
PR INTERVAL: 116 MS
PR INTERVAL: 128 MS
Q ONSET: 218 MS
Q ONSET: 223 MS
QRS COUNT: 10 BEATS
QRS COUNT: 21 BEATS
QRS DURATION: 128 MS
QRS DURATION: 78 MS
QT INTERVAL: 314 MS
QT INTERVAL: 406 MS
QTC CALCULATION(BAZETT): 418 MS
QTC CALCULATION(BAZETT): 456 MS
QTC FREDERICIA: 403 MS
QTC FREDERICIA: 414 MS
R AXIS: -23 DEGREES
R AXIS: 19 DEGREES
T AXIS: 117 DEGREES
T AXIS: 24 DEGREES
T OFFSET: 375 MS
T OFFSET: 426 MS
VENTRICULAR RATE: 127 BPM
VENTRICULAR RATE: 64 BPM

## 2024-06-17 PROCEDURE — 2500000004 HC RX 250 GENERAL PHARMACY W/ HCPCS (ALT 636 FOR OP/ED): Performed by: INTERNAL MEDICINE

## 2024-06-17 PROCEDURE — 94668 MNPJ CHEST WALL SBSQ: CPT

## 2024-06-17 PROCEDURE — 2500000001 HC RX 250 WO HCPCS SELF ADMINISTERED DRUGS (ALT 637 FOR MEDICARE OP): Performed by: STUDENT IN AN ORGANIZED HEALTH CARE EDUCATION/TRAINING PROGRAM

## 2024-06-17 PROCEDURE — 1200000002 HC GENERAL ROOM WITH TELEMETRY DAILY

## 2024-06-17 PROCEDURE — 2500000002 HC RX 250 W HCPCS SELF ADMINISTERED DRUGS (ALT 637 FOR MEDICARE OP, ALT 636 FOR OP/ED): Performed by: INTERNAL MEDICINE

## 2024-06-17 PROCEDURE — 2500000005 HC RX 250 GENERAL PHARMACY W/O HCPCS: Performed by: INTERNAL MEDICINE

## 2024-06-17 PROCEDURE — 2500000002 HC RX 250 W HCPCS SELF ADMINISTERED DRUGS (ALT 637 FOR MEDICARE OP, ALT 636 FOR OP/ED): Performed by: STUDENT IN AN ORGANIZED HEALTH CARE EDUCATION/TRAINING PROGRAM

## 2024-06-17 PROCEDURE — 94640 AIRWAY INHALATION TREATMENT: CPT | Mod: MUE

## 2024-06-17 RX ORDER — RISPERIDONE 0.5 MG/1
3.5 TABLET ORAL NIGHTLY
Status: CANCELLED
Start: 2024-06-18

## 2024-06-17 RX ORDER — ACETAMINOPHEN 500 MG
5 TABLET ORAL NIGHTLY
Status: CANCELLED
Start: 2024-06-18

## 2024-06-17 RX ORDER — RISPERIDONE 0.5 MG/1
0.5 TABLET ORAL 2 TIMES DAILY
Status: CANCELLED
Start: 2024-06-18

## 2024-06-17 RX ORDER — IPRATROPIUM BROMIDE AND ALBUTEROL SULFATE 2.5; .5 MG/3ML; MG/3ML
3 SOLUTION RESPIRATORY (INHALATION) EVERY 2 HOUR PRN
Qty: 180 ML | Refills: 11 | Status: CANCELLED | OUTPATIENT
Start: 2024-06-17

## 2024-06-17 ASSESSMENT — COGNITIVE AND FUNCTIONAL STATUS - GENERAL
WALKING IN HOSPITAL ROOM: TOTAL
MOVING TO AND FROM BED TO CHAIR: TOTAL
EATING MEALS: TOTAL
DAILY ACTIVITIY SCORE: 6
TOILETING: TOTAL
MOVING FROM LYING ON BACK TO SITTING ON SIDE OF FLAT BED WITH BEDRAILS: TOTAL
TURNING FROM BACK TO SIDE WHILE IN FLAT BAD: TOTAL
MOBILITY SCORE: 6
HELP NEEDED FOR BATHING: TOTAL
CLIMB 3 TO 5 STEPS WITH RAILING: TOTAL
STANDING UP FROM CHAIR USING ARMS: TOTAL
PERSONAL GROOMING: TOTAL
DRESSING REGULAR LOWER BODY CLOTHING: TOTAL
DRESSING REGULAR UPPER BODY CLOTHING: TOTAL

## 2024-06-17 ASSESSMENT — PAIN SCALES - GENERAL
PAINLEVEL_OUTOF10: 0 - NO PAIN

## 2024-06-17 NOTE — PROGRESS NOTES
PT IS SLOWING IMPROVING, 02 DOWN TO 2 LITERS. PLAN IS TO RETURN TO HER GROUP HOME , UPDATED FAMILY .  PT IS AT HER BASELINE, SHE ONLY MOVES HER ARMS.  WILL UPDATE THE GROUP HOME.  Cathi Tubbs RN [facility]    1014  SPOKE WITH THE  AT [facility] AND THEY CAN ACCEPT PT BACK WITH OR WITHOUT 02, AGAIN TO REITERATE PT'S BASELINE IS ONLY ABLE TO MOVE UPPER EXTREMITIES.    Cathi Tubbs RN [facility]

## 2024-06-17 NOTE — CARE PLAN
Problem: Pain  Goal: My pain/discomfort is manageable  Outcome: Progressing     Problem: Safety  Goal: Patient will be injury free during hospitalization  Outcome: Progressing  Goal: I will remain free of falls  Outcome: Progressing     Problem: Daily Care  Goal: Daily care needs are met  Outcome: Progressing     Problem: Psychosocial Needs  Goal: Demonstrates ability to cope with hospitalization/illness  Outcome: Progressing  Goal: Collaborate with me, my family, and caregiver to identify my specific goals  Outcome: Progressing     Problem: Discharge Barriers  Goal: My discharge needs are met  Outcome: Progressing     Problem: Skin  Goal: Participates in plan/prevention/treatment measures  Outcome: Progressing  Flowsheets (Taken 6/17/2024 0059)  Participates in plan/prevention/treatment measures: Elevate heels  Goal: Prevent/manage excess moisture  Outcome: Progressing  Flowsheets (Taken 6/14/2024 1550 by Amanda Behrend, RN)  Prevent/manage excess moisture: Cleanse incontinence/protect with barrier cream  Goal: Prevent/minimize sheer/friction injuries  Outcome: Progressing  Flowsheets (Taken 6/17/2024 0059)  Prevent/minimize sheer/friction injuries: Use pull sheet  Goal: Promote/optimize nutrition  Outcome: Progressing  Flowsheets (Taken 6/17/2024 0059)  Promote/optimize nutrition: Monitor/record intake including meals     Problem: Respiratory  Goal: Minimal/no exertional discomfort or dyspnea this shift  Outcome: Progressing  Goal: No signs of respiratory distress (eg. Use of accessory muscles. Peds grunting)  Outcome: Progressing  Goal: Patent airway maintained this shift  Outcome: Progressing  Goal: Verbalize decreased shortness of breath this shift  Outcome: Progressing   The patient's goals for the shift include      The clinical goals for the shift include rest and improve breathing    Over the shift, the patient did not make progress toward the following goals. Barriers to progression include .  Recommendations to address these barriers include .

## 2024-06-17 NOTE — CARE PLAN
The patient's goals for the shift include      The clinical goals for the shift include rest      Problem: Pain  Goal: My pain/discomfort is manageable  Outcome: Progressing     Problem: Safety  Goal: Patient will be injury free during hospitalization  Outcome: Progressing  Goal: I will remain free of falls  Outcome: Progressing     Problem: Daily Care  Goal: Daily care needs are met  Outcome: Progressing     Problem: Psychosocial Needs  Goal: Demonstrates ability to cope with hospitalization/illness  Outcome: Progressing  Goal: Collaborate with me, my family, and caregiver to identify my specific goals  Outcome: Progressing     Problem: Discharge Barriers  Goal: My discharge needs are met  Outcome: Progressing     Problem: Skin  Goal: Participates in plan/prevention/treatment measures  Outcome: Progressing  Goal: Prevent/manage excess moisture  Outcome: Progressing  Goal: Prevent/minimize sheer/friction injuries  Outcome: Progressing  Goal: Promote/optimize nutrition  Outcome: Progressing     Problem: Respiratory  Goal: Minimal/no exertional discomfort or dyspnea this shift  Outcome: Progressing  Goal: No signs of respiratory distress (eg. Use of accessory muscles. Peds grunting)  Outcome: Progressing  Goal: Patent airway maintained this shift  Outcome: Progressing  Goal: Verbalize decreased shortness of breath this shift  Outcome: Progressing
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
## 2024-06-18 VITALS
HEART RATE: 102 BPM | HEIGHT: 58 IN | DIASTOLIC BLOOD PRESSURE: 55 MMHG | OXYGEN SATURATION: 96 % | TEMPERATURE: 98.2 F | WEIGHT: 170 LBS | RESPIRATION RATE: 16 BRPM | SYSTOLIC BLOOD PRESSURE: 114 MMHG | BODY MASS INDEX: 35.68 KG/M2

## 2024-06-18 PROBLEM — R05.1 ACUTE COUGH: Status: ACTIVE | Noted: 2023-05-08

## 2024-06-18 PROBLEM — K59.09 OTHER CONSTIPATION: Status: ACTIVE | Noted: 2024-06-18

## 2024-06-18 LAB
ANION GAP SERPL CALC-SCNC: 15 MMOL/L (ref 10–20)
BUN SERPL-MCNC: 13 MG/DL (ref 6–23)
CALCIUM SERPL-MCNC: 8.8 MG/DL (ref 8.6–10.3)
CHLORIDE SERPL-SCNC: 102 MMOL/L (ref 98–107)
CO2 SERPL-SCNC: 23 MMOL/L (ref 21–32)
CREAT SERPL-MCNC: 0.42 MG/DL (ref 0.5–1.05)
EGFRCR SERPLBLD CKD-EPI 2021: >90 ML/MIN/1.73M*2
GLUCOSE SERPL-MCNC: 78 MG/DL (ref 74–99)
HOLD SPECIMEN: NORMAL
HOLD SPECIMEN: NORMAL
POTASSIUM SERPL-SCNC: 4 MMOL/L (ref 3.5–5.3)
SODIUM SERPL-SCNC: 136 MMOL/L (ref 136–145)

## 2024-06-18 PROCEDURE — 2500000001 HC RX 250 WO HCPCS SELF ADMINISTERED DRUGS (ALT 637 FOR MEDICARE OP): Performed by: STUDENT IN AN ORGANIZED HEALTH CARE EDUCATION/TRAINING PROGRAM

## 2024-06-18 PROCEDURE — 2500000004 HC RX 250 GENERAL PHARMACY W/ HCPCS (ALT 636 FOR OP/ED): Performed by: INTERNAL MEDICINE

## 2024-06-18 PROCEDURE — 94640 AIRWAY INHALATION TREATMENT: CPT | Mod: MUE

## 2024-06-18 PROCEDURE — 2500000004 HC RX 250 GENERAL PHARMACY W/ HCPCS (ALT 636 FOR OP/ED): Performed by: NURSE PRACTITIONER

## 2024-06-18 PROCEDURE — 36415 COLL VENOUS BLD VENIPUNCTURE: CPT | Performed by: INTERNAL MEDICINE

## 2024-06-18 PROCEDURE — 2500000002 HC RX 250 W HCPCS SELF ADMINISTERED DRUGS (ALT 637 FOR MEDICARE OP, ALT 636 FOR OP/ED): Performed by: STUDENT IN AN ORGANIZED HEALTH CARE EDUCATION/TRAINING PROGRAM

## 2024-06-18 PROCEDURE — 80048 BASIC METABOLIC PNL TOTAL CA: CPT | Performed by: INTERNAL MEDICINE

## 2024-06-18 PROCEDURE — 94668 MNPJ CHEST WALL SBSQ: CPT

## 2024-06-18 PROCEDURE — 2500000002 HC RX 250 W HCPCS SELF ADMINISTERED DRUGS (ALT 637 FOR MEDICARE OP, ALT 636 FOR OP/ED): Performed by: INTERNAL MEDICINE

## 2024-06-18 PROCEDURE — 2500000004 HC RX 250 GENERAL PHARMACY W/ HCPCS (ALT 636 FOR OP/ED): Performed by: STUDENT IN AN ORGANIZED HEALTH CARE EDUCATION/TRAINING PROGRAM

## 2024-06-18 RX ORDER — ACETYLCYSTEINE 200 MG/ML
3 SOLUTION ORAL; RESPIRATORY (INHALATION)
Qty: 270 ML | Refills: 0 | Status: SHIPPED | OUTPATIENT
Start: 2024-06-18 | End: 2024-07-18

## 2024-06-18 RX ORDER — FLUTICASONE PROPIONATE 50 MCG
2 SPRAY, SUSPENSION (ML) NASAL DAILY
Qty: 16 G | Refills: 12 | Status: SHIPPED | OUTPATIENT
Start: 2024-06-18

## 2024-06-18 RX ORDER — POLYETHYLENE GLYCOL 3350 17 G/17G
17 POWDER, FOR SOLUTION ORAL 2 TIMES DAILY
Start: 2024-06-18

## 2024-06-18 RX ORDER — FLUTICASONE FUROATE AND VILANTEROL 100; 25 UG/1; UG/1
1 POWDER RESPIRATORY (INHALATION)
Qty: 1 EACH | Refills: 2 | Status: SHIPPED | OUTPATIENT
Start: 2024-06-18 | End: 2024-07-18

## 2024-06-18 RX ORDER — DOCUSATE SODIUM 100 MG/1
100 CAPSULE, LIQUID FILLED ORAL 2 TIMES DAILY PRN
Start: 2024-06-18

## 2024-06-18 RX ORDER — IPRATROPIUM BROMIDE AND ALBUTEROL SULFATE 2.5; .5 MG/3ML; MG/3ML
3 SOLUTION RESPIRATORY (INHALATION)
Start: 2024-06-18 | End: 2024-06-19 | Stop reason: SDUPTHER

## 2024-06-18 RX ORDER — BENZONATATE 100 MG/1
100 CAPSULE ORAL 3 TIMES DAILY PRN
Qty: 20 CAPSULE | Refills: 0 | Status: SHIPPED | OUTPATIENT
Start: 2024-06-18

## 2024-06-18 RX ORDER — ALBUTEROL SULFATE 0.83 MG/ML
2.5 SOLUTION RESPIRATORY (INHALATION) EVERY 4 HOURS PRN
Qty: 300 ML | Refills: 1 | Status: SHIPPED | OUTPATIENT
Start: 2024-06-18 | End: 2024-07-21

## 2024-06-18 ASSESSMENT — COGNITIVE AND FUNCTIONAL STATUS - GENERAL
TURNING FROM BACK TO SIDE WHILE IN FLAT BAD: TOTAL
WALKING IN HOSPITAL ROOM: TOTAL
MOVING FROM LYING ON BACK TO SITTING ON SIDE OF FLAT BED WITH BEDRAILS: TOTAL
MOVING TO AND FROM BED TO CHAIR: TOTAL
MOBILITY SCORE: 6
HELP NEEDED FOR BATHING: TOTAL
DRESSING REGULAR UPPER BODY CLOTHING: TOTAL
DAILY ACTIVITIY SCORE: 6
TOILETING: TOTAL
STANDING UP FROM CHAIR USING ARMS: TOTAL
DRESSING REGULAR LOWER BODY CLOTHING: TOTAL
CLIMB 3 TO 5 STEPS WITH RAILING: TOTAL
PERSONAL GROOMING: TOTAL
EATING MEALS: TOTAL

## 2024-06-18 ASSESSMENT — PAIN - FUNCTIONAL ASSESSMENT: PAIN_FUNCTIONAL_ASSESSMENT: 0-10

## 2024-06-18 ASSESSMENT — PAIN SCALES - GENERAL: PAINLEVEL_OUTOF10: 0 - NO PAIN
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
## 2024-06-18 NOTE — CARE PLAN
The patient's goals for the shift include      The clinical goals for the shift include Pulse ox >92% on room air this shift    Over the shift, the patient did not make progress toward the following goals. Barriers to progression include ***. Recommendations to address these barriers include ***.

## 2024-06-18 NOTE — CONSULTS
Dietitian consult ordered by this RD for LOS day 10.  MST=0.  Pt averaging 86% of meals.  Is on minced moist diet with 1:1 feeding.  No pressure injuries noted. No unintentional significant weight loss found during review of EMR.  Full nutrition assessment not warranted at this time. This service remains available.   Please re-consult RD for changes in nutrition status.

## 2024-06-18 NOTE — PROGRESS NOTES
Spoke with Dr Mead whom is going to discharge pt back to her Group Home today, pt will not need no home 02.  Dr Mead requested I let pt's sister aware that she has been trying to reach her and Dr Mead told me to give her the office number to speak with her.  I did call the sister Opal and explained her sister is discharging today and that MD has been trying reach her and provided Opal with Dr Mead's office number.  I notified pt's group home of pt discharging home today, will update   RN for number to give report.  Cathi Tubbs RN [facility]

## 2024-06-19 ENCOUNTER — TELEPHONE (OUTPATIENT)
Dept: PRIMARY CARE | Facility: CLINIC | Age: 29
End: 2024-06-19
Payer: COMMERCIAL

## 2024-06-19 DIAGNOSIS — J45.909 MODERATE ASTHMA, UNSPECIFIED WHETHER COMPLICATED, UNSPECIFIED WHETHER PERSISTENT (HHS-HCC): ICD-10-CM

## 2024-06-19 RX ORDER — IPRATROPIUM BROMIDE AND ALBUTEROL SULFATE 2.5; .5 MG/3ML; MG/3ML
3 SOLUTION RESPIRATORY (INHALATION)
Qty: 180 ML | Refills: 2 | Status: SHIPPED | OUTPATIENT
Start: 2024-06-19

## 2024-06-19 NOTE — DISCHARGE SUMMARY
Discharge Diagnosis  Shortness of breath  #1/Acute exacerbation of asthmatic bronchitis with mild hypoxia requiring 2 to 3 L of nasal cannula oxygen and was weaned off the oxygen on discharge to room air and was treated with IV steroids and tapered to p.o. steroids and aerosol treatments and patient had pulmonary consultation done by Dr. maciel.  Chest x-ray on admission and follow-up chest x-ray was negative for any infiltrates  Patient had pooling of secretions in her upper airway during her hospitalization and for management of secretions and swallowing evaluation was done and no significant aspiration seen/CoughAssist technique for chest physio to assist with bringing up secretions was used/and patient was switched to inhaled steroids and prednisone was discontinued prior to discharge/and CT scan of the neck on admission showed mildly hypertrophied and enlarged adenoids and patient had ENT consultation done by Dr. Sánchez 6/14/2024 and did not recommend any emergent endoscopy/also was seen by GI for concern for aspiration and also had swallowing evaluation and speech evaluation/and has had 2 modified barium swallows in the past which were negative x 2.  GI did not recommend any further testing at present time.  Aspiration precautions to be used on feeding.  Patient was recommended a modified diet per speech therapist.  Patient was continued on her bronchodilators and steroid inhalers patient clinically improved and was discharged to her group home in stable condition.  2./#2/atypical chest pain and sinus tachycardia/patient had a normal EKG with normal sinus rhythm and had mild sinus tachycardia and was continued on her metoprolol XL 12.5 mg p.o. twice daily and also her sinus tachycardia was worsened by her albuterol aerosol treatment/for heart rate was in the low 100s prior to discharge.  Patient was asymptomatic.  Patient was also seen by cardiology team and recommended no further testing and her chest pain  resolved  3./History of cerebral palsy and quadriplegia/history of congenital deformity of the hip/history of anxiety and schizoaffective disorder/and remained stable on her Risperdal.  4./Mildly enlarged adenoids seen on admission on CT scan of the neck/no stridor/patient had no difficulty with swallowing food/seen by ENT Dr. Sánchez and advised to follow-up in the office but no emergent testing in the hospital.  I left 2 messages for the sister Cari but never got a phone call back.  I did discuss with  Cathi prior to discharge but the sister can call me at my office #[phone] if she has any questions regarding her hospital care as well as her discharge.  Patient was weaned off the oxygen.  Patient was doing much better and clinically stable and was cleared for discharge by pulmonary team also.  Patient was discharged to group home in stable condition.  Patient advised to follow-up with her own PCP in 1 to 2 weeks.  Issues Requiring Follow-Up      Discharge Meds     Your medication list        START taking these medications        Instructions Last Dose Given Next Dose Due   acetylcysteine 200 mg/mL (20 %) nebulizer solution  Commonly known as: Mucomyst      Take 3 mL (600 mg) by nebulization 3 times a day.       benzonatate 100 mg capsule  Commonly known as: Tessalon      Take 1 capsule (100 mg) by mouth 3 times a day as needed for cough. Do not crush or chew.       docusate sodium 100 mg capsule  Commonly known as: Colace      Take 1 capsule (100 mg) by mouth 2 times a day as needed for constipation. Over the Counter       fluticasone 50 mcg/actuation nasal spray  Commonly known as: Flonase  Replaces: budesonide 32 mcg/actuation nasal spray      Administer 2 sprays into each nostril once daily. Shake gently. Before first use, prime pump. After use, clean tip and replace cap.       fluticasone furoate-vilanteroL 100-25 mcg/dose inhaler  Commonly known as: Breo Ellipta  Replaces: Symbicort 80-4.5  mcg/actuation inhaler      Inhale 1 puff once daily.       ipratropium-albuteroL 0.5-2.5 mg/3 mL nebulizer solution  Commonly known as: Duo-Neb      Take 3 mL by nebulization 3 times a day.       polyethylene glycol 17 gram/dose powder  Commonly known as: Glycolax, Miralax      Take 17 g by mouth 2 times a day. Over the counter              CHANGE how you take these medications        Instructions Last Dose Given Next Dose Due   albuterol 2.5 mg /3 mL (0.083 %) nebulizer solution  What changed:   when to take this  reasons to take this      Take 3 mL (2.5 mg) by nebulization every 4 hours if needed for wheezing.              CONTINUE taking these medications        Instructions Last Dose Given Next Dose Due   acetaminophen 325 mg tablet  Commonly known as: Tylenol           AEROCHAMBER PLUS FLOW-VU,L MSK MISC           alum-mag hydroxide-simeth 400-400-40 mg/5 mL suspension  Commonly known as: Maalox MAX           ammonium lactate 12 % cream  Commonly known as: Amlactin           cholecalciferol 50 MCG (2000 UT) tablet  Commonly known as: Vitamin D3      Take 1 tablet (50 mcg) by mouth once daily.       ciclopirox 0.77 % gel  Commonly known as: Loprox           famotidine 20 mg tablet  Commonly known as: Pepcid      Take 1 tablet (20 mg) by mouth once daily. Take on an empty stomach       ferrous sulfate (325 mg ferrous sulfate) tablet           hydrOXYzine HCL 50 mg tablet  Commonly known as: Atarax      Take 1 tablet (50 mg) by mouth once daily at bedtime.       melatonin 5 mg capsule      Take 1 capsule (5 mg) by mouth once daily at bedtime.       metoprolol tartrate 25 mg tablet  Commonly known as: Lopressor           montelukast 10 mg tablet  Commonly known as: Singulair           multivitamin tablet           mupirocin 2 % ointment  Commonly known as: Bactroban           Nasal Moisturizing 0.65 % nasal spray  Generic drug: sodium chloride           omeprazole 20 mg DR capsule  Commonly known as: PriLOSEC            omeprazole OTC 20 mg EC tablet  Commonly known as: PriLOSEC OTC      Take 1 tablet (20 mg) by mouth once daily. Take on an empty stomach       Refresh Plus 0.5 % ophthalmic solution  Generic drug: lubricating eye drops           risperiDONE 1 mg tablet  Commonly known as: RisperDAL      Take 0.5 tablets (0.5 mg) by mouth once daily at bedtime.       risperiDONE 3 mg tablet  Commonly known as: RisperDAL      Take 1 tablet (3 mg) by mouth once daily at bedtime.       risperiDONE 0.5 mg tablet  Commonly known as: RisperDAL      Take 1 tablet (0.5 mg) by mouth twice daily - morning and 2pm.              STOP taking these medications      budesonide 32 mcg/actuation nasal spray  Commonly known as: Rhinocort AQ  Replaced by: fluticasone 50 mcg/actuation nasal spray        predniSONE 20 mg tablet  Commonly known as: Deltasone        Symbicort 80-4.5 mcg/actuation inhaler  Generic drug: budesonide-formoteroL  Replaced by: fluticasone furoate-vilanteroL 100-25 mcg/dose inhaler                  Where to Get Your Medications        These medications were sent to [facility] [place] [id number] [facility]  [address] [address] [id number]      Phone: [phone]   acetylcysteine 200 mg/mL (20 %) nebulizer solution  albuterol 2.5 mg /3 mL (0.083 %) nebulizer solution  benzonatate 100 mg capsule  fluticasone 50 mcg/actuation nasal spray  fluticasone furoate-vilanteroL 100-25 mcg/dose inhaler       Information about where to get these medications is not yet available    Ask your nurse or doctor about these medications  docusate sodium 100 mg capsule  ipratropium-albuteroL 0.5-2.5 mg/3 mL nebulizer solution  polyethylene glycol 17 gram/dose powder         Test Results Pending At Discharge  Pending Labs       No current pending labs.            Hospital Course   This is a 28 years old female with history of cerebral palsy and quadriplegia and congenital hip deformity, history of asthma, history of anxiety and  seizure disorder affective disorder was brought to the emergency room from her group home with increased shortness of breath and cough.  Patient was admitted to telemetry floor with acute exacerbation of asthmatic bronchitis.  Chest x-ray on admission did not show any acute infiltrates and patient was started on IV steroids and IV antibiotics for her bronchitis and aerosol treatments.  During the course of her hospitalization she became mildly hypoxic and had bilateral wheezing and was given 2 to 3 L of nasal cannula oxygen.  Pulmonary consultation was also obtained.  Patient had a CT of the neck in the ED which showed mildly enlarged adenoids.  ENT consultation was obtained by Dr. Sánchez and he recommended no emergent procedures needed at this present time and patient could follow-up as an outpatient for further evaluation of her adenoids.  During the course of her hospitalization patient also complained of mild chest pain and her EKG was normal sinus rhythm and no acute ST-T changes and troponin level was negative and cardiology consultation was obtained and they recommended no further testing and her chest pain resolved.  Patient also had GI evaluation to assess for aspiration and had swallowing evaluation also done.  Patient was seen by speech therapy and was started on modified diet.  Patient overall clinically started to improve and she was weaned off the oxygen and was tapered off her IV and p.o. steroids and was discharged back to group home in improving and stable condition.  Medications were reconciled.  Labs and vitals noted.  Total time spent 40 minutes/time spent on assessment and plan and documentation and discharge planning.  Case was also discussed with the .    Pertinent Physical Exam At Time of Discharge  Physical Exam  Vitals and nursing note reviewed.   Constitutional:       General: She is not in acute distress.     Appearance: Normal appearance. She is normal weight. She is not  ill-appearing or toxic-appearing.   HENT:      Head: Normocephalic and atraumatic.      Right Ear: Tympanic membrane and ear canal normal. There is no impacted cerumen.      Left Ear: Tympanic membrane, ear canal and external ear normal.      Nose: Nose normal. No congestion or rhinorrhea.      Mouth/Throat:      Pharynx: Oropharynx is clear. No oropharyngeal exudate or posterior oropharyngeal erythema.   Eyes:      General: No scleral icterus.        Right eye: No discharge.         Left eye: No discharge.      Extraocular Movements: Extraocular movements intact.      Conjunctiva/sclera: Conjunctivae normal.      Pupils: Pupils are equal, round, and reactive to light.   Neck:      Vascular: No carotid bruit.   Cardiovascular:      Rate and Rhythm: Normal rate and regular rhythm.      Pulses: Normal pulses.      Heart sounds: Normal heart sounds. No murmur heard.     No gallop.   Pulmonary:      Effort: Pulmonary effort is normal. No respiratory distress.      Breath sounds: Normal breath sounds. No wheezing, rhonchi or rales.   Abdominal:      General: Abdomen is flat. Bowel sounds are normal. There is no distension.      Palpations: Abdomen is soft. There is no mass.      Tenderness: There is no abdominal tenderness. There is no right CVA tenderness, left CVA tenderness, guarding or rebound.      Hernia: No hernia is present.   Musculoskeletal:         General: No swelling or tenderness. Normal range of motion.      Cervical back: Normal range of motion and neck supple. No rigidity.      Right lower leg: No edema.      Left lower leg: No edema.   Lymphadenopathy:      Cervical: No cervical adenopathy.   Skin:     General: Skin is warm.      Coloration: Skin is not jaundiced.      Findings: No erythema or rash.   Neurological:      General: No focal deficit present.      Mental Status: She is alert and oriented to person, place, and time. Mental status is at baseline.      Sensory: No sensory deficit.      Motor:  No weakness.      Gait: Gait normal.      Deep Tendon Reflexes: Reflexes normal.   Psychiatric:         Mood and Affect: Mood normal.         Thought Content: Thought content normal.         Judgment: Judgment normal.         Outpatient Follow-Up  Future Appointments   Date Time Provider Department [place]   6/27/2024 10:00 AM JESSICA Cristobal-CNP [id number] [place]   6/27/2024  4:00 PM Zuly Delgado, [facility]-S [id number] [facility]   7/1/2024 11:15 AM Matt Stevens MD [id number] [place]   7/2/2024  1:00 PM Shelia Garcia MD [id number] [place]   11/22/2024  1:30 PM Addis Mak MD [id number] [facility]   11/27/2024 10:00 AM Malini Fatima OD [id number] [place]         Charles Mead MD

## 2024-06-19 NOTE — TELEPHONE ENCOUNTER
[facility] nurse Issac - pt was just discharged from hosp - would like a script for duo-neb 3ml 3 times daily - sent to [facility] - pt is schedule for 6/24 for hosp follow up - please advise if ok to send in

## 2024-06-20 ENCOUNTER — PATIENT OUTREACH (OUTPATIENT)
Dept: CARE COORDINATION | Facility: CLINIC | Age: 29
End: 2024-06-20
Payer: COMMERCIAL

## 2024-06-20 NOTE — PROGRESS NOTES
Discharge Facility:[facility]   Discharge Diagnosis:  Shortness of breath  Moderate asthma, unspecified whether complicated, unspecified whether persistent   Seasonal allergic rhinitis due to pollen  Other constipation    Admission Date:6/7/2024  Discharge Date: 6/18/2024    PCP Appointment Date:6/24/2024  Specialist Appointment Date:   6/25/2024 /[facility] Encounter and Summary: Linked     *Two attempts were made to reach patient within two business days after discharge. Voicemail left with contact information for patient to call back with any non-emergent questions or concerns.

## 2024-06-24 ENCOUNTER — APPOINTMENT (OUTPATIENT)
Dept: PRIMARY CARE | Facility: CLINIC | Age: 29
End: 2024-06-24
Payer: MEDICARE

## 2024-06-24 VITALS
SYSTOLIC BLOOD PRESSURE: 117 MMHG | RESPIRATION RATE: 16 BRPM | TEMPERATURE: 98.5 F | DIASTOLIC BLOOD PRESSURE: 81 MMHG | OXYGEN SATURATION: 96 % | HEART RATE: 76 BPM

## 2024-06-24 DIAGNOSIS — J35.2 ENLARGED ADENOIDS: Primary | ICD-10-CM

## 2024-06-24 DIAGNOSIS — J45.901: ICD-10-CM

## 2024-06-24 DIAGNOSIS — K59.09 OTHER CONSTIPATION: ICD-10-CM

## 2024-06-24 PROBLEM — R91.8 BILATERAL PULMONARY INFILTRATES ON CXR: Status: ACTIVE | Noted: 2024-06-05

## 2024-06-24 PROCEDURE — 99495 TRANSJ CARE MGMT MOD F2F 14D: CPT | Performed by: INTERNAL MEDICINE

## 2024-06-24 PROCEDURE — 1036F TOBACCO NON-USER: CPT | Performed by: INTERNAL MEDICINE

## 2024-06-24 PROCEDURE — 3008F BODY MASS INDEX DOCD: CPT | Performed by: INTERNAL MEDICINE

## 2024-06-24 RX ORDER — POLYETHYLENE GLYCOL 3350 17 G/17G
17 POWDER, FOR SOLUTION ORAL 2 TIMES DAILY
Qty: 850 G | Refills: 11 | Status: SHIPPED | OUTPATIENT
Start: 2024-06-24

## 2024-06-24 RX ORDER — DOCUSATE SODIUM 100 MG/1
100 CAPSULE, LIQUID FILLED ORAL 2 TIMES DAILY PRN
Qty: 60 CAPSULE | Refills: 1 | Status: SHIPPED | OUTPATIENT
Start: 2024-06-24

## 2024-06-24 NOTE — PATIENT INSTRUCTIONS
I want Anisha to see Ent (Dr. Sánchez) who saw her in the hospital for enlarged adenoids    I want Anisha to schedule a followup with her pulmonary doctor    We need to keep the n-acetyl cysteine as an nebulized treatment as the pill does not treat the mucus.    Oxygen looks great today

## 2024-06-24 NOTE — PROGRESS NOTES
"Patient: Anisha Quevedo  : 1995  PCP: Shelia Garcia MD  MRN: [id number]  Program: Transitional Care Management  Status: Enrolled  Effective Dates: 2024 - present  Responsible Staff: Razia Pozo MA  Social Determinants to be Addressed: Financial Resource Strain, Physical Activity, Social Connections, Stress         Anisha Quevedo is a 28 y.o. female presenting today for follow-up after being discharged from the hospital 7 days ago. The main problem requiring admission was hypoxemia. The discharge summary and/or Transitional Care Management documentation was reviewed. Medication reconciliation was performed as indicated via the \"Aubrey as Reviewed\" timestamp.     Anisha Quevedo was contacted by Transitional Care Management services two days after her discharge. This encounter and supporting documentation was reviewed.    She was hospitalized for SOB and asthma flare. Found to have adenoid enlarged and mucus. Seen by ENT and pulmonary and GI    Anisha is feeling better  Has some mild SOB but better  Sore throat as well    Review of Systems   All other systems reviewed and are negative.      /81 (BP Location: Left arm, Patient Position: Sitting, BP Cuff Size: Adult)   Pulse 76   Temp 36.9 °C (98.5 °F)   Resp 16   SpO2 96%     Physical Exam  Constitutional:       Appearance: Normal appearance.   HENT:      Right Ear: Tympanic membrane normal.      Left Ear: Tympanic membrane normal.      Mouth/Throat:      Mouth: Mucous membranes are dry.      Pharynx: No oropharyngeal exudate or posterior oropharyngeal erythema.   Cardiovascular:      Rate and Rhythm: Normal rate and regular rhythm.      Heart sounds: Normal heart sounds. No murmur heard.     No gallop.   Pulmonary:      Effort: Pulmonary effort is normal. No respiratory distress.      Breath sounds: Normal breath sounds. No wheezing or rhonchi.   Musculoskeletal:      Right lower leg: No edema.      Left lower leg: No edema.   Neurological:      Mental " Status: She is alert.         The complexity of medical decision making for this patient's transitional care is moderate.    Assessment/Plan   Problem List Items Addressed This Visit             ICD-10-CM    Other constipation K59.09    Relevant Medications    polyethylene glycol (Glycolax, Miralax) 17 gram/dose powder    docusate sodium (Colace) 100 mg capsule    Acute exacerbation of IgE mediated allergic asthma ([facility]) J45.901     Other Visit Diagnoses         Codes    Enlarged adenoids    -  Primary J35.2    Relevant Orders    Referral to ENT          Recurrent asthma flares  -return to pulmonary  -on mucolytic therapy--advised cannot change to pill  -lungs clear and pulse ox good    Enlarged adenoids- send to ENT    Chronic GI issues- to see GI tomorrow

## 2024-06-25 ENCOUNTER — OFFICE VISIT (OUTPATIENT)
Dept: GASTROENTEROLOGY | Facility: CLINIC | Age: 29
End: 2024-06-25
Payer: MEDICARE

## 2024-06-25 DIAGNOSIS — K21.9 GASTROESOPHAGEAL REFLUX DISEASE WITHOUT ESOPHAGITIS: Primary | ICD-10-CM

## 2024-06-25 PROCEDURE — 99212 OFFICE O/P EST SF 10 MIN: CPT | Performed by: NURSE PRACTITIONER

## 2024-06-25 PROCEDURE — 3008F BODY MASS INDEX DOCD: CPT | Performed by: NURSE PRACTITIONER

## 2024-06-25 ASSESSMENT — ENCOUNTER SYMPTOMS
MUSCULOSKELETAL NEGATIVE: 1
APNEA: 0
SHORTNESS OF BREATH: 0
EYES NEGATIVE: 1
STRIDOR: 0
CHILLS: 0
FEVER: 0
ALLERGIC/IMMUNOLOGIC NEGATIVE: 1
CARDIOVASCULAR NEGATIVE: 1
HEMATOLOGIC/LYMPHATIC NEGATIVE: 1
COUGH: 0
CHEST TIGHTNESS: 0
ENDOCRINE NEGATIVE: 1
FATIGUE: 0
RESPIRATORY NEGATIVE: 1
DIFFICULTY URINATING: 0
NEUROLOGICAL NEGATIVE: 1
DIAPHORESIS: 0
PSYCHIATRIC NEGATIVE: 1
ROS GI COMMENTS: SEE HPI
WHEEZING: 0

## 2024-06-25 NOTE — PROGRESS NOTES
Subjective   Patient ID: Anisha Quevedo is a 28 y.o. female who presents for Follow-up. PMH: asthma, GERD, CP     Unsure why they scheduled a GI appointment.  She was vomiting in the past but this has now resolved.   She is using Prilosec   Recently admitted for SOB and referred to ENT    She is using Miralax for constipation  Prilosec for reflux    She denies reflux, heartburn, abdominal pain, dysphagia, odynophagia.     Review of Systems   Constitutional:  Negative for chills, diaphoresis, fatigue and fever.   HENT: Negative.     Eyes: Negative.    Respiratory: Negative.  Negative for apnea, cough, chest tightness, shortness of breath, wheezing and stridor.    Cardiovascular: Negative.    Gastrointestinal:         See HPI    Endocrine: Negative.    Genitourinary: Negative.  Negative for difficulty urinating.   Musculoskeletal: Negative.    Skin: Negative.    Allergic/Immunologic: Negative.    Neurological: Negative.    Hematological: Negative.    Psychiatric/Behavioral: Negative.         Objective   Physical Exam  Neurological:      Mental Status: She is alert.         Assessment/Plan   There are no diagnoses linked to this encounter.     This is a 28 year old female with a PMH of asthma, GERD, cerebral palsy and wheel chair bound. Last seen in GI in 2022. She presents today for hospitalization follow-up. She is doing well with Prilosec and Miralax. No longer vomiting. She did have an EGD in May which showed inactive gastritis.     Follow up in 4-6 months or sooner as needed.       JESSICA Cristobal-CNP 06/25/24 1:16 PM

## 2024-06-26 ENCOUNTER — PATIENT OUTREACH (OUTPATIENT)
Dept: CARE COORDINATION | Facility: CLINIC | Age: 29
End: 2024-06-26
Payer: MEDICARE

## 2024-06-26 DIAGNOSIS — J45.909 ASTHMA, UNSPECIFIED ASTHMA SEVERITY, UNSPECIFIED WHETHER COMPLICATED, UNSPECIFIED WHETHER PERSISTENT (HHS-HCC): Primary | ICD-10-CM

## 2024-06-26 PROBLEM — J18.9 PRIMARY ATYPICAL PNEUMONIA: Status: ACTIVE | Noted: 2024-06-26

## 2024-06-26 NOTE — PROGRESS NOTES
Unable to reach patient for call back after patient's follow up appointment with PCP.   MICAHM with call back number for patient to call if needed   If no voicemail available call attempts x 2 were made to contact the patient to assist with any questions or concerns patient may have.

## 2024-06-27 ENCOUNTER — APPOINTMENT (OUTPATIENT)
Dept: BEHAVIORAL HEALTH | Facility: CLINIC | Age: 29
End: 2024-06-27
Payer: MEDICARE

## 2024-06-27 ENCOUNTER — APPOINTMENT (OUTPATIENT)
Dept: GASTROENTEROLOGY | Facility: CLINIC | Age: 29
End: 2024-06-27
Payer: COMMERCIAL

## 2024-07-01 ENCOUNTER — PATIENT MESSAGE (OUTPATIENT)
Dept: BEHAVIORAL HEALTH | Facility: CLINIC | Age: 29
End: 2024-07-01

## 2024-07-01 ENCOUNTER — APPOINTMENT (OUTPATIENT)
Dept: BEHAVIORAL HEALTH | Facility: CLINIC | Age: 29
End: 2024-07-01
Payer: MEDICARE

## 2024-07-01 DIAGNOSIS — G47.09 OTHER INSOMNIA: ICD-10-CM

## 2024-07-01 DIAGNOSIS — G80.0 CP (CEREBRAL PALSY), SPASTIC, QUADRIPLEGIC (MULTI): ICD-10-CM

## 2024-07-01 DIAGNOSIS — F70 MILD INTELLECTUAL DISABILITY: ICD-10-CM

## 2024-07-01 DIAGNOSIS — F20.0 PARANOID SCHIZOPHRENIA (MULTI): Primary | Chronic | ICD-10-CM

## 2024-07-01 DIAGNOSIS — F20.9 SCHIZOPHRENIA, UNSPECIFIED TYPE (MULTI): ICD-10-CM

## 2024-07-01 DIAGNOSIS — F41.9 ANXIETY: ICD-10-CM

## 2024-07-01 PROBLEM — R05.1 ACUTE COUGH: Status: RESOLVED | Noted: 2023-05-08 | Resolved: 2024-07-01

## 2024-07-01 PROBLEM — R91.8 BILATERAL PULMONARY INFILTRATES ON CXR: Status: RESOLVED | Noted: 2024-06-05 | Resolved: 2024-07-01

## 2024-07-01 PROBLEM — J18.9 PRIMARY ATYPICAL PNEUMONIA: Status: RESOLVED | Noted: 2024-06-26 | Resolved: 2024-07-01

## 2024-07-01 PROBLEM — R00.2 PALPITATIONS: Status: RESOLVED | Noted: 2023-05-08 | Resolved: 2024-07-01

## 2024-07-01 PROBLEM — R07.89 ATYPICAL CHEST PAIN: Status: RESOLVED | Noted: 2023-05-08 | Resolved: 2024-07-01

## 2024-07-01 PROCEDURE — 3008F BODY MASS INDEX DOCD: CPT | Performed by: PSYCHIATRY & NEUROLOGY

## 2024-07-01 PROCEDURE — 99443 PR PHYS/QHP TELEPHONE EVALUATION 21-30 MIN: CPT | Performed by: PSYCHIATRY & NEUROLOGY

## 2024-07-01 PROCEDURE — 1036F TOBACCO NON-USER: CPT | Performed by: PSYCHIATRY & NEUROLOGY

## 2024-07-01 RX ORDER — MELATONIN 5 MG
5 CAPSULE ORAL NIGHTLY
Qty: 30 CAPSULE | Refills: 11 | Status: SHIPPED | OUTPATIENT
Start: 2024-07-01

## 2024-07-01 RX ORDER — RISPERIDONE 0.5 MG/1
TABLET ORAL
Qty: 60 TABLET | Refills: 11 | Status: SHIPPED | OUTPATIENT
Start: 2024-07-01

## 2024-07-01 RX ORDER — HYDROXYZINE HYDROCHLORIDE 50 MG/1
50 TABLET, FILM COATED ORAL NIGHTLY
Qty: 30 TABLET | Refills: 11 | Status: SHIPPED | OUTPATIENT
Start: 2024-07-01

## 2024-07-01 RX ORDER — RISPERIDONE 1 MG/1
0.5 TABLET ORAL NIGHTLY
Qty: 15 TABLET | Refills: 11 | Status: SHIPPED | OUTPATIENT
Start: 2024-07-01

## 2024-07-01 RX ORDER — RISPERIDONE 3 MG/1
3 TABLET ORAL NIGHTLY
Qty: 30 TABLET | Refills: 11 | Status: SHIPPED | OUTPATIENT
Start: 2024-07-01

## 2024-07-01 ASSESSMENT — ENCOUNTER SYMPTOMS
COUGH: 1
SEIZURES: 0
DYSURIA: 0
SLEEP DISTURBANCE: 0
ABDOMINAL DISTENTION: 0
NAUSEA: 0
TREMORS: 0
HALLUCINATIONS: 0
VOMITING: 0
DIZZINESS: 0
PALPITATIONS: 0
TROUBLE SWALLOWING: 1
CHOKING: 0
SHORTNESS OF BREATH: 0

## 2024-07-01 NOTE — PROGRESS NOTES
Outpatient Psychiatry    A telephone visit (audio only) between the patient (at the originating site) and the provider (at the distant site) was utilized to provide this telehealth service.    The patient, family, caregivers and guardian (as appropriate) have provided consent to conduct treatment via this telehealth service.  The patient's identity and physical location were verified at the time of this visit.      Present for appointment: Anisha and [facility] OSWALDO (Wilfrido).    SUBJECTIVE    Appointment needed to be changed from in-person to virtual due to transportation problems.  Anisha was admitted to [facility] 6/07 to 6/18 for acute exacerbation of asthma.  She was also having some recurrent problems with vomiting; EGD showed gastritis; she is now on a minced/moist diet which has been helping.  She is happy to be home from the hospital and continues to recover.  She is going to be celebrating her birthday with a combined party with her house-mate later in July.  Looking forward to [facility] events for the July 4th holiday this week.  Mood and anxiety symptoms have been very well-controlled.  She does not seem to have any increase in paranoid or delusional thinking; no current issues with A/V hallucinations.  She continues to see Zuly Delgado for therapy about one to two times a month.  No sleep difficulties reported.    Review of Systems   HENT:  Positive for trouble swallowing. Negative for drooling.    Respiratory:  Positive for cough. Negative for choking and shortness of breath.    Cardiovascular:  Negative for palpitations.   Gastrointestinal:  Negative for abdominal distention, nausea and vomiting.   Genitourinary:  Negative for dysuria.   Neurological:  Negative for dizziness, tremors and seizures.   Psychiatric/Behavioral:  Negative for hallucinations and sleep disturbance.      MEDICATION HISTORY  Quetiapine - ineffective & overly sedating    CURRENT MEDICATIONS    Current Outpatient  Medications:     acetaminophen (Tylenol) 325 mg tablet, Take 1-2 tablets (325-650 mg) by mouth every 4 hours if needed for fever (temp greater than 38.0 C) (or pain). No more than 3000 mg per day, Disp: , Rfl:     acetylcysteine (Mucomyst) 200 mg/mL (20 %) nebulizer solution, Take 3 mL (600 mg) by nebulization 3 times a day., Disp: 270 mL, Rfl: 0    albuterol 2.5 mg /3 mL (0.083 %) nebulizer solution, Take 3 mL (2.5 mg) by nebulization every 4 hours if needed for wheezing., Disp: 300 mL, Rfl: 1    alum-mag hydroxide-simeth (Maalox MAX) 400-400-40 mg/5 mL suspension, Take 15 mL by mouth once daily as needed., Disp: , Rfl:     ammonium lactate (Amlactin) 12 % cream, Apply 1 Application topically once daily. Apply to right heal, Disp: , Rfl:     benzonatate (Tessalon) 100 mg capsule, Take 1 capsule (100 mg) by mouth 3 times a day as needed for cough. Do not crush or chew., Disp: 20 capsule, Rfl: 0    cholecalciferol (Vitamin D3) 50 MCG (2000 UT) tablet, Take 1 tablet (50 mcg) by mouth once daily., Disp: 30 tablet, Rfl: 11    ciclopirox (Loprox) 0.77 % gel, Apply 1 Application topically once daily. Apply to right great toenail, Disp: , Rfl:     docusate sodium (Colace) 100 mg capsule, Take 1 capsule (100 mg) by mouth 2 times a day as needed for constipation. Over the Counter, Disp: 60 capsule, Rfl: 1    famotidine (Pepcid) 20 mg tablet, Take 1 tablet (20 mg) by mouth once daily. Take on an empty stomach, Disp: 90 tablet, Rfl: 0    ferrous sulfate 325 (65 Fe) MG tablet, Take 1 tablet by mouth once daily., Disp: , Rfl:     fluticasone (Flonase) 50 mcg/actuation nasal spray, Administer 2 sprays into each nostril once daily. Shake gently. Before first use, prime pump. After use, clean tip and replace cap., Disp: 16 g, Rfl: 12    fluticasone furoate-vilanteroL (Breo Ellipta) 100-25 mcg/dose inhaler, Inhale 1 puff once daily., Disp: 1 each, Rfl: 2    hydrOXYzine HCL (Atarax) 50 mg tablet, Take 1 tablet (50 mg) by mouth once  daily at bedtime., Disp: 30 tablet, Rfl: 2    inhaler,assist device,lg mask (AEROCHAMBER PLUS FLOW-VU,L MSK MISC), Use as directed with inhaler, Disp: , Rfl:     ipratropium-albuteroL (Duo-Neb) 0.5-2.5 mg/3 mL nebulizer solution, Take 3 mL by nebulization 3 times a day., Disp: 180 mL, Rfl: 2    melatonin 5 mg capsule, Take 1 capsule (5 mg) by mouth once daily at bedtime., Disp: 30 capsule, Rfl: 2    metoprolol tartrate (Lopressor) 25 mg tablet, Take 0.5 tablets (12.5 mg) by mouth 2 times a day., Disp: , Rfl:     montelukast (Singulair) 10 mg tablet, Take 1 tablet (10 mg) by mouth once daily at bedtime., Disp: , Rfl:     multivitamin tablet, Take 1 tablet by mouth every other day., Disp: , Rfl:     mupirocin (Bactroban) 2 % ointment, Apply a pea-size amount inside both nostrils once a day, Disp: , Rfl:     omeprazole (PriLOSEC) 20 mg DR capsule, Take 1 capsule (20 mg) by mouth once daily in the morning. Take before meals., Disp: , Rfl:     omeprazole OTC (PriLOSEC OTC) 20 mg EC tablet, Take 1 tablet (20 mg) by mouth once daily. Take on an empty stomach, Disp: 90 tablet, Rfl: 0    polyethylene glycol (Glycolax, Miralax) 17 gram/dose powder, Take 17 g by mouth 2 times a day. Over the counter, Disp: 850 g, Rfl: 11    Refresh Plus 0.5 % ophthalmic solution, Administer 1 drop into both eyes 2 times a day., Disp: , Rfl:     risperiDONE (RisperDAL) 0.5 mg tablet, Take 1 tablet (0.5 mg) by mouth twice daily - morning and 2pm., Disp: 60 tablet, Rfl: 2    risperiDONE (RisperDAL) 1 mg tablet, Take 0.5 tablets (0.5 mg) by mouth once daily at bedtime., Disp: 15 tablet, Rfl: 2    risperiDONE (RisperDAL) 3 mg tablet, Take 1 tablet (3 mg) by mouth once daily at bedtime., Disp: 30 tablet, Rfl: 2    sodium chloride (Nasal Moisturizing) 0.65 % nasal spray, Administer 1 spray into each nostril 2 times a day as needed (for dryness)., Disp: , Rfl:     SOCIAL HISTORY  Living situation [facility]   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[facility]   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[facility] [employer] 5 days/week   School N/A   Guardianship Self   SSA N/A   Bx Specialist N/A   Nicotine None   Alcohol None   Other drugs None     OBJECTIVE    Lab Results   Component Value Date    HGB 13.3 06/16/2024     06/16/2024    NEUTROABS 4.68 06/07/2024    GLUCOSE 78 06/18/2024     06/18/2024    K 4.0 06/18/2024    CO2 23 06/18/2024    CALCIUM 8.8 06/18/2024    CREATININE 0.42 (L) 06/18/2024    AST 14 06/15/2024    ALT 24 06/15/2024    TSH 2.10 06/07/2024    FREET4 0.86 11/22/2023    CHOL 173 11/22/2023    LDLF 78 10/07/2020    TRIG 37 11/22/2023    YROABIUD36 716 11/22/2023    VITD25 23 (L) 11/22/2023     Therapeutic Drug Monitoring  N/A    Electrocardiograms  06/10/2024: NSR, non-sp ST/TW abn, , QTc 394  12/06/2023: NSR, VR 93, QTc 425  05/19/2021: NSR, VR 80, QTc 405    MENTAL STATUS EXAM  General/Appearance: Unable to assess.  Attitude/Behavior: Actively engages with interview.  Speech/Communication: Normal volume/rate/tone.  Motor: Unable to assess.  Gait: In wheelchair.  Mood: Appears to be in good spirits.  Affect: Unable to assess.  Thought processes: Organized/coherent.  Thought content: No delusions. Future-oriented.  Perception: Unable to assess.  Sensorium/Cognition: Alert, awake, oriented to person/place/time. Intellectual impairment.  Memory: Not directly assessed at this time.  Insight: Fair.  Judgment: Good.    ASSESSMENT  Anisha has remained stable from a psychiatric/behavioral standpoint -- no treatment changes indicated at this visit.    PROBLEM LIST  Intellectual developmental disorder, mild  Schizophrenia  Anxiety disorder, r/o PTSD    PLAN  -- Continue risperidone 0.5mg - 0.5mg - 3.5mg (AM, 14:00, HS) for psychosis  -- Continue melatonin 5mg at bedtime for sleep  -- Continue hydroxyzine 50mg at bedtime for sleep and anxiety  -- Follow up 12 weeks    Matt Stevens MD    Prep time on date of the patient encounter: -- minutes   Time spent  directly with patient/family/caregiver: 30 minutes   Additional time spent on patient care activities: -- minutes   Documentation time: -- minutes   Other time spent: -- minutes   Total time on date of patient encounter: -- minutes

## 2024-07-01 NOTE — PATIENT INSTRUCTIONS
Anisha has remained stable from a psychiatric/behavioral standpoint -- no treatment changes indicated at this visit.    Next appointment: Monday 9/30/2024 at 4:00 PM lula.

## 2024-07-02 ENCOUNTER — APPOINTMENT (OUTPATIENT)
Dept: PRIMARY CARE | Facility: CLINIC | Age: 29
End: 2024-07-02
Payer: MEDICARE

## 2024-07-19 ENCOUNTER — TELEPHONE (OUTPATIENT)
Dept: PRIMARY CARE | Facility: CLINIC | Age: 29
End: 2024-07-19
Payer: MEDICARE

## 2024-07-19 NOTE — TELEPHONE ENCOUNTER
Questions regarding the barium swallow and if pt had a diet change in June to support that? Please call and advise

## 2024-07-24 ENCOUNTER — APPOINTMENT (OUTPATIENT)
Dept: OBSTETRICS AND GYNECOLOGY | Facility: CLINIC | Age: 29
End: 2024-07-24
Payer: MEDICARE

## 2024-07-24 VITALS — SYSTOLIC BLOOD PRESSURE: 110 MMHG | DIASTOLIC BLOOD PRESSURE: 70 MMHG

## 2024-07-24 DIAGNOSIS — N76.0 ACUTE VAGINITIS: Primary | ICD-10-CM

## 2024-07-24 DIAGNOSIS — N89.8 VAGINAL IRRITATION: ICD-10-CM

## 2024-07-24 DIAGNOSIS — Z11.3 ROUTINE SCREENING FOR STI (SEXUALLY TRANSMITTED INFECTION): ICD-10-CM

## 2024-07-24 PROCEDURE — 1036F TOBACCO NON-USER: CPT | Performed by: OBSTETRICS & GYNECOLOGY

## 2024-07-24 PROCEDURE — 99214 OFFICE O/P EST MOD 30 MIN: CPT | Performed by: OBSTETRICS & GYNECOLOGY

## 2024-07-24 PROCEDURE — 87591 N.GONORRHOEAE DNA AMP PROB: CPT

## 2024-07-24 PROCEDURE — 87205 SMEAR GRAM STAIN: CPT

## 2024-07-24 PROCEDURE — 87661 TRICHOMONAS VAGINALIS AMPLIF: CPT

## 2024-07-24 PROCEDURE — 87491 CHLMYD TRACH DNA AMP PROBE: CPT

## 2024-07-24 ASSESSMENT — PAIN SCALES - GENERAL: PAINLEVEL: 0-NO PAIN

## 2024-07-24 NOTE — PROGRESS NOTES
Subjective   Patient ID: Anisha Quevedo is a 29 y.o. female who presents for Vaginitis/Bacterial Vaginosis (Vaginal Itchng and burning///Chaperone Declined: DELBERT Woo/).  Here with her transport aide.  Itching and burning since May.  Recently in the hospital for asthma and pneumonia.  She has this last (BV) one year ago and felt better after medication.   Otherwise, no issues.           Review of Systems    Objective   Physical Exam  Constitutional:       Comments: Sitting in wheelchair   Genitourinary:     General: Normal vulva.   Neurological:      Mental Status: She is alert.         Assessment/Plan   Problem List Items Addressed This Visit    None  Visit Diagnoses         Codes    Acute vaginitis    -  Primary N76.0    Vaginal irritation     N89.8    Relevant Orders    Vaginitis Gram Stain For Bacterial Vaginosis + Yeast    Vaginitis Gram Stain For Bacterial Vaginosis + Yeast    Routine screening for STI (sexually transmitted infection)     Z11.3    Relevant Orders    C. Trachomatis / N. Gonorrhoeae, Amplified Detection    Trichomonas vaginalis, Amplified             Deidre Head MD 07/24/24 3:11 PM Subjective   Anisha Quevedo is a 29 y.o. female who complains of vaginal discharge/itch.    HPI:  Symptoms reported: vaginal itching and burning  Onset: 2 months ago  Course: persistent  Progression: stayed the same    Helpful treatments: none  Unhelpful treatments tried: none    Objective   Physical Exam:   General Appearance: alert and oriented, in no acute distress  Abdomen: soft, non-tender; bowel sounds normal; no masses, no organomegaly  Pelvic Exam: external genitalia normal  Urine dipstick: not done.    Assessment/Plan   Diagnoses and all orders for this visit:  Acute vaginitis  Vaginal irritation  -     Vaginitis Gram Stain For Bacterial Vaginosis + Yeast  -     Vaginitis Gram Stain For Bacterial Vaginosis + Yeast; Future  Routine screening for STI (sexually transmitted infection)  -     C.  Trachomatis / N. Gonorrhoeae, Amplified Detection  -     Trichomonas vaginalis, Amplified

## 2024-07-24 NOTE — TELEPHONE ENCOUNTER
Spoke with Helen from [facility] and clarified   Barium swallow was ordered by different provider  They will fax over copy

## 2024-07-25 LAB
BACTERIAL VAGINOSIS VAG-IMP: NORMAL
C TRACH RRNA SPEC QL NAA+PROBE: NEGATIVE
CLUE CELLS VAG LPF-#/AREA: NORMAL /[LPF]
N GONORRHOEA DNA SPEC QL PROBE+SIG AMP: NEGATIVE
NUGENT SCORE: 4
T VAGINALIS RRNA SPEC QL NAA+PROBE: NEGATIVE
YEAST VAG WET PREP-#/AREA: NORMAL

## 2024-07-26 ENCOUNTER — TELEPHONE (OUTPATIENT)
Dept: OBSTETRICS AND GYNECOLOGY | Facility: CLINIC | Age: 29
End: 2024-07-26
Payer: MEDICARE

## 2024-07-26 NOTE — TELEPHONE ENCOUNTER
----- Message from Deidre Head sent at 7/26/2024  9:36 AM EDT -----  Regarding: Call the patient and let her know the results, please.  Call the patient and let her know the results, please.  ----- Message -----  From: Lab, Background User  Sent: 7/25/2024  12:27 PM EDT  To: Deidre Head MD

## 2024-07-26 NOTE — TELEPHONE ENCOUNTER
Sheila contacted to discuss margie's lab results.   Sheila states she has already seen them on line.  The treatment options for  bv swab explained.  IE: Try probiotic vs antibiotic.  Sheila will send a MyChart response regarding pts choice.

## 2024-08-09 ENCOUNTER — APPOINTMENT (OUTPATIENT)
Dept: BEHAVIORAL HEALTH | Facility: CLINIC | Age: 29
End: 2024-08-09
Payer: MEDICARE

## 2024-08-14 NOTE — PROGRESS NOTES
Patient: Anisha Quevedo    [id number]  : 1995 -- AGE 29 y.o.    Provider: Jackie LOPEZ- [facility]     Location [facility]   Service Date: 24       Department of Medicine  Division of Pulmonary, Critical Care, and Sleep Medicine         [facility]  Follow Up Visit Note  Virtual or Telephone Consent  An interactive audio and video telecommunication system which permits real time communications between the patient (at the originating site) and provider (at the distant site) was utilized to provide this telehealth service.   Verbal consent was requested and obtained from Anisha Quevedo on this date, 24 for a telehealth visit.     HISTORY OF PRESENT ILLNESS     HISTORY OF PRESENT ILLNESS   Anisha Quevedo is a 29 y.o. female who has a history of cerebral palsy.  She is a never smokers, who presents to a [facility] for a follow up evaluation for asthma exacerbation/pneumonia hospital follow up.    DATE OF LAST VISIT: 23    Current History    Since last visit she had an ED visit for an asthma exacerbation/pneumonia on 2024.  She was treated with duo nebs, steroids and magnesium.  CT neg for PE.  Today caregiver is speaking for Mrs. Quevedo due to her not feeling well and COVID going around the home and where she lives then.  She states she tested negative for COVID but is resting now.    Since hospital stay she is no longer short of breath.  Seen PCP who heard Stanislav Williamson in upper airways and extended prednisone 10 mg daily.  Nurse states that she is breathing more shallow and not taking deep breaths during breathing treatments but they are encouraging her to try to take deeper breaths during treatments. She has a weak cough and unable to cough mucus out despite using acapella and mucinex. Nurse states that she sounds congested despite nebs, acapella and mucinex. Her SpO2 today 95% on room air. Patient has tried and failed  acapella flutter device as well as mucinex for mucus releif. Has an insufficient expiratory drive 2/2 cerebral palsy.     12/6/23: On today's visit, the patient is accompanied by a caregiver.  I called and spoke with her nurse at the facility she lives in to discuss the course of the last few months. She had a URI on 11/21/2023, PCP heard rhonchi on exam, she took a steroid burst  and Z-Waylon for 5 days. She reports feeling the same from a respiratory standpoint.  Occasional wheezing and cough. States she feels like her mucus is stuck in chest.  Denies shortness of breath at rest she did have a hospital visit a few months ago for the flu.  Today she is reporting sharp chest pain that is constant.  She has a history of anxiety and is not sure if it is anxiety related. Her nurse reports she has had a full cardiac workup for chest pains over the past 2 years and has been cleared by cardiology.    5/25/23: Mrs. Quevedo who has cerebral palsy and wheel chair bound is here accompanied by her caregiver. She uses symbicort 2 puffs twice a day. Duonebs twice a day. She reports an occasional cough. She says she noticed yesterday that she felt a little congested and SOB, duonebs helped. Denies wheezing, fevers chills, chest pain and ER visits for breathing issues.      Previous pulmonary history: She was previously told she has asthma. She currently is on no supplemental oxygen. She has never been to pulmonary rehab. Does not recall having AECOPD requiring antibiotics or prednisone.     Inhalers/nebulized medications: symbicort, Duo-nebs     Hospitalization History: She has not been hospitalized over the last year for breathing related problem.     Sleep history: Denies snoring, apnea, feeling tired during the day or taking naps during the day.        REVIEW OF SYSTEMS     REVIEW OF SYSTEMS  Review of Systems    Constitutional: No fever, no chills, no night sweats.    Eyes: No double vision, no floaters, no dry eyes.   ENT: See  HPI.   Neck: No neck stiffness.  Cardiovascular: No sharp chest pain, no heart racing, no leg swelling.  Respiratory: as noted in HPI.   Integumentary: No rashes or sores.  Neurological: No dizziness, no headaches. Sleeping well.  Psychiatric: No mood changes.     ALLERGIES AND MEDICATIONS     ALLERGIES  No Known Allergies    MEDICATIONS  Current Outpatient Medications   Medication Sig Dispense Refill    acetaminophen (Tylenol) 325 mg tablet Take 1-2 tablets (325-650 mg) by mouth every 4 hours if needed for fever (temp greater than 38.0 C) (or pain). No more than 3000 mg per day      albuterol 2.5 mg /3 mL (0.083 %) nebulizer solution Take 3 mL (2.5 mg) by nebulization every 4 hours if needed for wheezing. 300 mL 1    alum-mag hydroxide-simeth (Maalox MAX) 400-400-40 mg/5 mL suspension Take 15 mL by mouth once daily as needed.      ammonium lactate (Amlactin) 12 % cream Apply 1 Application topically once daily. Apply to right heal      benzonatate (Tessalon) 100 mg capsule Take 1 capsule (100 mg) by mouth 3 times a day as needed for cough. Do not crush or chew. 20 capsule 0    cholecalciferol (Vitamin D3) 50 MCG (2000 UT) tablet Take 1 tablet (50 mcg) by mouth once daily. 30 tablet 11    ciclopirox (Loprox) 0.77 % gel Apply 1 Application topically once daily. Apply to right great toenail      docusate sodium (Colace) 100 mg capsule Take 1 capsule (100 mg) by mouth 2 times a day as needed for constipation. Over the Counter 60 capsule 1    famotidine (Pepcid) 20 mg tablet Take 1 tablet (20 mg) by mouth once daily. Take on an empty stomach 90 tablet 0    ferrous sulfate 325 (65 Fe) MG tablet Take 1 tablet by mouth once daily.      fluticasone (Flonase) 50 mcg/actuation nasal spray Administer 2 sprays into each nostril once daily. Shake gently. Before first use, prime pump. After use, clean tip and replace cap. 16 g 12    fluticasone furoate-vilanteroL (Breo Ellipta) 100-25 mcg/dose inhaler Inhale 1 puff once daily. 1  each 2    hydrOXYzine HCL (Atarax) 50 mg tablet Take 1 tablet (50 mg) by mouth once daily at bedtime. 30 tablet 11    inhaler,assist device,lg mask (AEROCHAMBER PLUS FLOW-VU,L MSK MISC) Use as directed with inhaler      ipratropium-albuteroL (Duo-Neb) 0.5-2.5 mg/3 mL nebulizer solution Take 3 mL by nebulization 3 times a day. 180 mL 2    melatonin 5 mg capsule Take 1 capsule (5 mg) by mouth once daily at bedtime. 30 capsule 11    metoprolol tartrate (Lopressor) 25 mg tablet Take 0.5 tablets (12.5 mg) by mouth 2 times a day.      montelukast (Singulair) 10 mg tablet Take 1 tablet (10 mg) by mouth once daily at bedtime.      multivitamin tablet Take 1 tablet by mouth every other day.      mupirocin (Bactroban) 2 % ointment Apply a pea-size amount inside both nostrils once a day      omeprazole (PriLOSEC) 20 mg DR capsule Take 1 capsule (20 mg) by mouth once daily in the morning. Take before meals.      omeprazole OTC (PriLOSEC OTC) 20 mg EC tablet Take 1 tablet (20 mg) by mouth once daily. Take on an empty stomach 90 tablet 0    polyethylene glycol (Glycolax, Miralax) 17 gram/dose powder Take 17 g by mouth 2 times a day. Over the counter 850 g 11    Refresh Plus 0.5 % ophthalmic solution Administer 1 drop into both eyes 2 times a day.      risperiDONE (RisperDAL) 0.5 mg tablet Take 1 tablet (0.5 mg) by mouth twice daily - morning and 2pm. 60 tablet 11    risperiDONE (RisperDAL) 1 mg tablet Take 0.5 tablets (0.5 mg) by mouth once daily at bedtime. 15 tablet 11    risperiDONE (RisperDAL) 3 mg tablet Take 1 tablet (3 mg) by mouth once daily at bedtime. 30 tablet 11    sodium chloride (Nasal Moisturizing) 0.65 % nasal spray Administer 1 spray into each nostril 2 times a day as needed (for dryness).       No current facility-administered medications for this visit.         PAST HISTORY     PAST MEDICAL HISTORY  Asthma  Anxiety  GERD     PAST SURGICAL HISTORY  Past Surgical History:   Procedure Laterality Date     ESOPHAGOGASTRODUODENOSCOPY  05/29/2024    OTHER SURGICAL HISTORY  03/03/2021    Hip surgery    OTHER SURGICAL HISTORY  08/22/2019    Percutaneous endoscopic gastrostomy tube insertion    OTHER SURGICAL HISTORY  02/19/2020    Heart surgery    SPINAL FIXATION SURGERY  07/25/2014    Spinal Arthrodesis For Deformity By Posterior Approach       IMMUNIZATION HISTORY  Immunization History   Administered Date(s) Administered    Flu vaccine (IIV4), preservative free *Check age/dose* 10/07/2020, 10/04/2021, 09/09/2022, 10/11/2022, 11/01/2023    Flu vaccine, trivalent, preservative free, age 6 months and greater (Fluarix/Fluzone/Flulaval) 12/23/2009    Hepatitis A vaccine, pediatric/adolescent (HAVRIX, VAQTA) 06/19/2013, 09/03/2014    Influenza, seasonal, injectable 12/08/2006, 12/05/2008    Meningococcal ACWY vaccine (MENVEO) 09/03/2014    Meningococcal ACWY-D (Menactra) 4-valent conjugate vaccine 12/05/2008    Novel influenza-H1N1-09, preservative-free 12/23/2009    Pfizer COVID-19 vaccine, Fall 2023, 12 years and older, (30mcg/0.3mL) 11/01/2023    Pfizer COVID-19 vaccine, bivalent, age 12 years and older (30 mcg/0.3 mL) 10/11/2022    Pfizer Purple Cap SARS-CoV-2 01/14/2021, 02/04/2021, 11/05/2021    Pneumococcal polysaccharide vaccine, 23-valent, age 2 years and older (PNEUMOVAX 23) 10/13/2020    RSV-MAb 11/01/2023    Tdap vaccine, age 7 year and older (BOOSTRIX, ADACEL) 12/05/2008, 10/13/2020    Varicella vaccine, subcutaneous (VARIVAX) 12/05/2008       SOCIAL HISTORY  Tobacco Smoking: never  Illicit drugs: none  Alcohol consumption: none  Pets: none    OCCUPATIONAL/ENVIRONMENTAL HISTORY  Occupation: Disability.  No have known exposure to asbestos, silica, beryllium or inhaled metals.    FAMILY HISTORY  Family History   Problem Relation Name Age of Onset    Hypertension Sister Taffy     Leukemia Sister Taffy     Schizophrenia Sister Tafjoe      No have a family history of pulmonary disease.  No have a family history of  cancer.    PHYSICAL EXAM     VITAL SIGNS:   There were no vitals filed for this visit.        PREVIOUS WEIGHTS: There is no height or weight on file to calculate BMI.    Wt Readings from Last 3 Encounters:   06/07/24 77.1 kg (170 lb)   05/03/24 77.6 kg (171 lb)   01/05/24 77.1 kg (170 lb)       Physical Exam    Constitutional: General appearance: Alert and oriented.  No acute distress. Well developed, well nourished.  Head and face: Symmetric  Pulmonary: Chest is normal. No increased work of breathing or signs of respiratory distress. Clear to auscultation bilaterally - no crackles, wheezing, or rhonchi.   Cardiovascular: Heart rate and rhythm normal. Normal S1, S2 - no murmurs, gallops, or pericardial rub.   Abdomen: Soft, non tender, +BS  Extremities: No edema. No clubbing or cyanosis of the fingernails.    Neurologic: Moves all four extremities   MSK: Normal movements of extremities. Gait normal   Psychiatric: Intact judgement and insight.    RESULTS/DATA     Pulmonary Function Test Results     6/23/21: FEV1/FVC 0.59 /FEV1 0.87 (36% + BD response)/ FVC 1.48 (54%)/ DLCO 68%     Chest Radiograph     XR CHEST 1 VIEW 04/26/2022    Narrative  MRN: [id number]  Patient Name: JB BURRELL    STUDY:  CHEST 1 VIEW;  4/26/2022 3:14 pm    INDICATION:  cough, hx aspiration .    COMPARISON:  03/29/2021    ACCESSION NUMBER(S):  [id number]    ORDERING CLINICIAN:  BELL RIDLEY    FINDINGS:  A single AP portable radiograph of the chest was obtained.  Multiple  cardiac monitoring leads are seen over the chest.   Bilateral spinal  fusion rods are present. No focal infiltrate, pleural effusion or  pneumothorax is identified. The cardiac silhouette  is within normal  limits for size.    Impression  No focal infiltrate or pneumothorax is identified.      Chest CT Scan     STUDY:  CT Angiogram of the Chest; 6/7/2024 11:56 PM.  INDICATION:  Tachycardia.  Elevated d-dimer.  Evaluate for pulmonary embolism.  COMPARISON:  CXR  6/7/2024.  ACCESSION NUMBER(S):  [id number]  ORDERING CLINICIAN:  MEGAN WHITE  TECHNIQUE:  CTA of the chest was performed with intravenous contrast.   Images are reviewed and processed at a workstation according to the CT  angiogram protocol with 3-D and/or MIP post processing imaging  generated.  Omnipaque 350 90 mL was administered intravenously.  Automated mA/kV exposure control was utilized and patient examination  was performed in strict accordance with principles of ALARA.  FINDINGS:  Examination is significantly degraded by respiratory motion.  As such,  there is suboptimal evaluation of the segmental and subsegmental  pulmonary arterial branches.  No large central pulmonary embolus is  otherwise identified.  The thoracic aorta is normal in course and  caliber without dissection or aneurysm.  The heart is normal in size without pericardial effusion.  Thoracic  lymph nodes are not enlarged.  There is no pleural effusion, pleural thickening, or pneumothorax.   The airways are patent.  Minimal bibasilar atelectasis.  Lungs otherwise clear without distinct  consolidation or evidence for pulmonary edema.  Upper abdomen demonstrates no acute pathology.  There are no acute fractures.  No suspicious bony lesions.  Thoracal  lumbar scoliosis again demonstrated with postoperative changes of  thoracolumbar fixation with Auguste rods in place.  IMPRESSION:  Suboptimal evaluation of the segmental/subsegmental pulmonary arterial  branches due to extensive respiratory motion.  No central pulmonary  embolus otherwise identified.  No distinct acute intrathoracic process otherwise seen.  Signed by Dwayne Weir MD      Echocardiogram     [facility], [address] [place] [address]              Tel [phone] and Fax [phone]     TRANSTHORACIC ECHOCARDIOGRAM REPORT        Patient Name:     JB BURRELL  Reading           [id number] Saad Rao                                 Physician:         MD  Study Date:       5/3/2021     Referring         CASSIDY RAMIREZ                                 Physician:  MRN/PID:          [id number]     PCP:  Accession/Order#: [id number] Department        [facility]                                 Location:  YOB: 1995    Fellow:  Gender:           F            Nurse:  Admit Date:                    Sonographer:      Mayelin Garcia [facility]  Admission Status: Outpatient   Additional Staff:  Height:           149.86 cm    CC Report to:  Weight:           64.86 kg     Study Type:       Echocardiogram  BSA:              1.60 m2  Blood Pressure: 99 /69 mmHg     Diagnosis/ICD: R07.9-Chest pain, unspecified  Indication:    Chest Pain  Procedure/CPT: Echo Complete w Full Doppler-[id number]     Patient History:  Pertinent History: Chest Pain and Dyspnea. COVID-19 recovered (12/16/2020),                     tachycardia, schizophrenia, cerebral palsy-quadriplegic.     Study Detail: The following Echo studies were performed: 2D, M-Mode, Doppler and                color flow. Technically challenging study due to patient sitting                in wheel chair.        PHYSICIAN INTERPRETATION:  Left Ventricle: The left ventricular systolic function is normal, with an estimated ejection fraction of 65-70%. There are no regional wall motion abnormalities. The left ventricular cavity size is normal. There is left ventricular concentric remodeling. Spectral Doppler shows a normal pattern of left ventricular diastolic filling.  Left Atrium: The left atrium is normal in size.  Right Ventricle: The right ventricle is normal in size. There is normal right ventricular global systolic function.  Right Atrium: The right atrium is normal in size.  Aortic Valve: The aortic valve is trileaflet. There is no evidence of aortic valve regurgitation. The peak instantaneous gradient of the aortic valve is 4.5 mmHg. The mean gradient of the aortic valve is 2.7 mmHg.  Mitral Valve: The  mitral valve is normal in structure. There is no evidence of mitral valve regurgitation.  Tricuspid Valve: The tricuspid valve is structurally normal. There is trace tricuspid regurgitation.  Pulmonic Valve: The pulmonic valve is structurally normal. There is physiologic pulmonic valve regurgitation.  Pericardium: There is no pericardial effusion noted.  Aorta: The aortic root is normal.  Pulmonary Artery: The tricuspid regurgitant velocity is 2.38 m/s, and with an estimated right atrial pressure of 3 mmHg, the estimated pulmonary artery pressure is normal with the RVSP at 25.6 mmHg.  Systemic Veins: The inferior vena cava appears to be of normal size. There is IVC inspiratory collapse greater than 50%.        CONCLUSIONS:   1. The left ventricular systolic function is normal with a 65-70% estimated ejection fraction.     QUANTITATIVE DATA SUMMARY:  2D MEASUREMENTS:                           Normal Ranges:  Ao Root d:     2.20 cm   (2.0-3.7cm)  LAs:           1.57 cm   (2.7-4.0cm)  IVSd:          1.03 cm   (0.6-1.1cm)  LVPWd:         1.18 cm   (0.6-1.1cm)  LVIDd:         2.59 cm   (3.9-5.9cm)  LVIDs:         1.90 cm  LV Mass Index: 49.2 g/m2  LV % FS        26.6 %     LA VOLUME:                                Normal Ranges:  LA Vol A4C:        32.1 ml    (22+/-6mL/m2)  LA Vol A2C:        26.7 ml  LA Vol BP:         29.3 ml  LA Vol Index A4C:  20.1 ml/m2  LA Vol Index A2C:  16.7 ml/m2  LA Vol Index BP:   18.3 ml/m2  LA Area A4C:       12.3 cm2  LA Area A2C:       11.2 cm2  LA Major Axis A4C: 4.0 cm  LA Major Axis A2C: 4.0 cm  LA Volume Index:   18.3 ml/m2  LA Vol A4C:        29.4 ml  LA Vol A2C:        25.9 ml     AORTA MEASUREMENTS:                     Normal Ranges:  Asc Ao, d: 2.30 cm (2.1-3.4cm)     LV DIASTOLIC FUNCTION:                         Normal Ranges:  MV Peak E:    0.77 m/s (0.7-1.2 m/s)  MV Peak A:    0.77 m/s (0.42-0.7 m/s)  E/A Ratio:    0.99     (1.0-2.2)  MV e'         0.08 m/s (>8.0)  MV lateral  "e' 0.09 m/s  MV medial e'  0.08 m/s  E/e' Ratio:   9.02     (<8.0)     AORTIC VALVE:                                    Normal Ranges:  AoV Vmax:                1.07 m/s (<1.7m/s)  AoV Peak P.5 mmHg (<20mmHg)  AoV Mean P.7 mmHg (1.7-11.5mmHg)  LVOT Max Vega:            0.79 m/s (<1.1m/s)  AoV VTI:                 16.86 cm (18-25cm)  LVOT VTI:                12.63 cm  LVOT Diameter:           1.60 cm  (1.8-2.4cm)  AoV Area, VTI:           1.52 cm2 (2.5-5.5cm2)  AoV Area,Vmax:           1.50 cm2 (2.5-4.5cm2)  AoV Dimensionless Index: 0.75     RIGHT VENTRICLE:  TAPSE: 19.0 mm  RV s'  0.15 m/s     TRICUSPID VALVE/RVSP:                              Normal Ranges:  Peak TR Velocity: 2.38 m/s  RV Syst Pressure: 25.6 mmHg (< 30mmHg)  IVC Diam:         1.40 cm     PULMONIC VALVE:                       Normal Ranges:  PV Max Vega: 0.8 m/s  (0.6-0.9m/s)  PV Max P.3 mmHg        [id number] Saad Rao MD  Electronically signed on 5/3/2021 at 2:44:25 PM          Labwork   Complete Blood Count  Lab Results   Component Value Date    WBC 11.9 (H) 2024    HGB 13.3 2024    HCT 40.5 2024    MCV 92 2024     2024       Peripheral Eosinophil Count/Percentage:   Eosinophils Absolute (x10*3/uL)   Date Value   2024 0.08     Eosinophils % (%)   Date Value   2024 1.1       Serum Immunoglobulin E:    No results found for: \"IGE\"       ASSESSMENT/PLAN     Ms. Quevedo is a 29 y.o. female, who has a history of cerebral palsy.  She is a never smoker, who presents to a [facility] for a follow up evaluation for asthma.    Patient has tried and failed acapella flutter device as well as mucinex for mucus releif. Has an insufficient expiratory drive 2/2 cerebral palsy.       Problem List and Orders  Problem List Items Addressed This Visit    None        Assessment and Plan / Recommendations:  Problem List Items Addressed This Visit  "   None     Asthma; moderate obstruction on most recent PFTs  - START Breo 200 mcg 1 puff once daily - rinse mouth after each use  - Continue albuterol HFA ipratropium/albuterol nebulizers twice a day  - Continue montelukast 10 mg at bedtime  - Acapella -ok to use PRN after breathing treatments for mucus relief  - Mucinex PRN for every 12 hours for mucus relief      - Will order percussion vest for mucus relief  - Will order prednisone burst    Follow up in 2 months or sooner if needed.    If you have any questions please call the office [phone]    Thank you for visiting the Pulmonary clinic today!   Jackie Ortiz CNP  [phone]     Electronically signed

## 2024-08-15 ENCOUNTER — PATIENT MESSAGE (OUTPATIENT)
Dept: PRIMARY CARE | Facility: CLINIC | Age: 29
End: 2024-08-15
Payer: MEDICARE

## 2024-08-15 DIAGNOSIS — G80.0 CP (CEREBRAL PALSY), SPASTIC, QUADRIPLEGIC (MULTI): Primary | ICD-10-CM

## 2024-08-16 ENCOUNTER — HOSPITAL ENCOUNTER (EMERGENCY)
Facility: HOSPITAL | Age: 29
Discharge: HOME | End: 2024-08-16
Attending: EMERGENCY MEDICINE
Payer: MEDICARE

## 2024-08-16 ENCOUNTER — APPOINTMENT (OUTPATIENT)
Dept: CARDIOLOGY | Facility: HOSPITAL | Age: 29
End: 2024-08-16
Payer: MEDICARE

## 2024-08-16 ENCOUNTER — APPOINTMENT (OUTPATIENT)
Dept: RADIOLOGY | Facility: HOSPITAL | Age: 29
End: 2024-08-16
Payer: MEDICARE

## 2024-08-16 VITALS
WEIGHT: 170 LBS | OXYGEN SATURATION: 95 % | RESPIRATION RATE: 20 BRPM | DIASTOLIC BLOOD PRESSURE: 78 MMHG | HEIGHT: 58 IN | HEART RATE: 98 BPM | SYSTOLIC BLOOD PRESSURE: 146 MMHG | BODY MASS INDEX: 35.68 KG/M2 | TEMPERATURE: 97.2 F

## 2024-08-16 DIAGNOSIS — R07.9 CHEST PAIN, UNSPECIFIED TYPE: ICD-10-CM

## 2024-08-16 DIAGNOSIS — R06.00 DYSPNEA, UNSPECIFIED TYPE: ICD-10-CM

## 2024-08-16 DIAGNOSIS — J45.901 EXACERBATION OF ASTHMA, UNSPECIFIED ASTHMA SEVERITY, UNSPECIFIED WHETHER PERSISTENT (HHS-HCC): Primary | ICD-10-CM

## 2024-08-16 LAB
ALBUMIN SERPL BCP-MCNC: 4.4 G/DL (ref 3.4–5)
ALP SERPL-CCNC: 59 U/L (ref 33–110)
ALT SERPL W P-5'-P-CCNC: 16 U/L (ref 7–45)
ANION GAP BLDV CALCULATED.4IONS-SCNC: 15 MMOL/L (ref 10–25)
ANION GAP SERPL CALC-SCNC: 13 MMOL/L (ref 10–20)
AST SERPL W P-5'-P-CCNC: 15 U/L (ref 9–39)
BASE EXCESS BLDV CALC-SCNC: 2.5 MMOL/L (ref -2–3)
BASOPHILS # BLD AUTO: 0.04 X10*3/UL (ref 0–0.1)
BASOPHILS NFR BLD AUTO: 0.7 %
BILIRUB DIRECT SERPL-MCNC: 0 MG/DL (ref 0–0.3)
BILIRUB SERPL-MCNC: 0.3 MG/DL (ref 0–1.2)
BNP SERPL-MCNC: 12 PG/ML (ref 0–99)
BODY TEMPERATURE: 37 DEGREES CELSIUS
BUN SERPL-MCNC: 9 MG/DL (ref 6–23)
CA-I BLDV-SCNC: 1.26 MMOL/L (ref 1.1–1.33)
CALCIUM SERPL-MCNC: 10.1 MG/DL (ref 8.6–10.3)
CARDIAC TROPONIN I PNL SERPL HS: 3 NG/L (ref 0–13)
CARDIAC TROPONIN I PNL SERPL HS: <3 NG/L (ref 0–13)
CHLORIDE BLDV-SCNC: 100 MMOL/L (ref 98–107)
CHLORIDE SERPL-SCNC: 102 MMOL/L (ref 98–107)
CO2 SERPL-SCNC: 27 MMOL/L (ref 21–32)
CREAT SERPL-MCNC: 0.47 MG/DL (ref 0.5–1.05)
D DIMER PPP FEU-MCNC: 570 NG/ML FEU
EGFRCR SERPLBLD CKD-EPI 2021: >90 ML/MIN/1.73M*2
EOSINOPHIL # BLD AUTO: 0.11 X10*3/UL (ref 0–0.7)
EOSINOPHIL NFR BLD AUTO: 1.9 %
ERYTHROCYTE [DISTWIDTH] IN BLOOD BY AUTOMATED COUNT: 13.6 % (ref 11.5–14.5)
GLUCOSE BLDV-MCNC: 81 MG/DL (ref 74–99)
GLUCOSE SERPL-MCNC: 80 MG/DL (ref 74–99)
HCO3 BLDV-SCNC: 27.9 MMOL/L (ref 22–26)
HCT VFR BLD AUTO: 43.7 % (ref 36–46)
HCT VFR BLD EST: 41 % (ref 36–46)
HGB BLD-MCNC: 14.4 G/DL (ref 12–16)
HGB BLDV-MCNC: 13.7 G/DL (ref 12–16)
IMM GRANULOCYTES # BLD AUTO: 0.01 X10*3/UL (ref 0–0.7)
IMM GRANULOCYTES NFR BLD AUTO: 0.2 % (ref 0–0.9)
INHALED O2 CONCENTRATION: 21 %
LACTATE BLDV-SCNC: 1.9 MMOL/L (ref 0.4–2)
LYMPHOCYTES # BLD AUTO: 1.76 X10*3/UL (ref 1.2–4.8)
LYMPHOCYTES NFR BLD AUTO: 30.3 %
MAGNESIUM SERPL-MCNC: 1.9 MG/DL (ref 1.6–2.4)
MCH RBC QN AUTO: 30.5 PG (ref 26–34)
MCHC RBC AUTO-ENTMCNC: 33 G/DL (ref 32–36)
MCV RBC AUTO: 93 FL (ref 80–100)
MONOCYTES # BLD AUTO: 0.67 X10*3/UL (ref 0.1–1)
MONOCYTES NFR BLD AUTO: 11.5 %
NEUTROPHILS # BLD AUTO: 3.22 X10*3/UL (ref 1.2–7.7)
NEUTROPHILS NFR BLD AUTO: 55.4 %
NRBC BLD-RTO: 0 /100 WBCS (ref 0–0)
OXYHGB MFR BLDV: 53.2 % (ref 45–75)
PCO2 BLDV: 45 MM HG (ref 41–51)
PH BLDV: 7.4 PH (ref 7.33–7.43)
PLATELET # BLD AUTO: 293 X10*3/UL (ref 150–450)
PO2 BLDV: 32 MM HG (ref 35–45)
POTASSIUM BLDV-SCNC: 4.8 MMOL/L (ref 3.5–5.3)
POTASSIUM SERPL-SCNC: 4.2 MMOL/L (ref 3.5–5.3)
PROT SERPL-MCNC: 7.8 G/DL (ref 6.4–8.2)
RBC # BLD AUTO: 4.72 X10*6/UL (ref 4–5.2)
SAO2 % BLDV: 54 % (ref 45–75)
SODIUM BLDV-SCNC: 138 MMOL/L (ref 136–145)
SODIUM SERPL-SCNC: 138 MMOL/L (ref 136–145)
WBC # BLD AUTO: 5.8 X10*3/UL (ref 4.4–11.3)

## 2024-08-16 PROCEDURE — 94640 AIRWAY INHALATION TREATMENT: CPT

## 2024-08-16 PROCEDURE — 71045 X-RAY EXAM CHEST 1 VIEW: CPT | Mod: FOREIGN READ | Performed by: RADIOLOGY

## 2024-08-16 PROCEDURE — 83880 ASSAY OF NATRIURETIC PEPTIDE: CPT | Performed by: EMERGENCY MEDICINE

## 2024-08-16 PROCEDURE — 36415 COLL VENOUS BLD VENIPUNCTURE: CPT | Performed by: EMERGENCY MEDICINE

## 2024-08-16 PROCEDURE — 83735 ASSAY OF MAGNESIUM: CPT | Performed by: EMERGENCY MEDICINE

## 2024-08-16 PROCEDURE — 84075 ASSAY ALKALINE PHOSPHATASE: CPT | Performed by: EMERGENCY MEDICINE

## 2024-08-16 PROCEDURE — 84484 ASSAY OF TROPONIN QUANT: CPT | Performed by: EMERGENCY MEDICINE

## 2024-08-16 PROCEDURE — 2500000002 HC RX 250 W HCPCS SELF ADMINISTERED DRUGS (ALT 637 FOR MEDICARE OP, ALT 636 FOR OP/ED): Performed by: EMERGENCY MEDICINE

## 2024-08-16 PROCEDURE — 85379 FIBRIN DEGRADATION QUANT: CPT | Performed by: EMERGENCY MEDICINE

## 2024-08-16 PROCEDURE — 71275 CT ANGIOGRAPHY CHEST: CPT | Performed by: RADIOLOGY

## 2024-08-16 PROCEDURE — 71275 CT ANGIOGRAPHY CHEST: CPT

## 2024-08-16 PROCEDURE — 96375 TX/PRO/DX INJ NEW DRUG ADDON: CPT

## 2024-08-16 PROCEDURE — 99285 EMERGENCY DEPT VISIT HI MDM: CPT | Mod: 25

## 2024-08-16 PROCEDURE — 85025 COMPLETE CBC W/AUTO DIFF WBC: CPT | Performed by: EMERGENCY MEDICINE

## 2024-08-16 PROCEDURE — 2550000001 HC RX 255 CONTRASTS: Performed by: EMERGENCY MEDICINE

## 2024-08-16 PROCEDURE — 84132 ASSAY OF SERUM POTASSIUM: CPT | Performed by: EMERGENCY MEDICINE

## 2024-08-16 PROCEDURE — 2500000004 HC RX 250 GENERAL PHARMACY W/ HCPCS (ALT 636 FOR OP/ED): Performed by: EMERGENCY MEDICINE

## 2024-08-16 PROCEDURE — 96365 THER/PROPH/DIAG IV INF INIT: CPT

## 2024-08-16 PROCEDURE — 71045 X-RAY EXAM CHEST 1 VIEW: CPT

## 2024-08-16 PROCEDURE — 93005 ELECTROCARDIOGRAM TRACING: CPT

## 2024-08-16 RX ORDER — MAGNESIUM SULFATE HEPTAHYDRATE 40 MG/ML
2 INJECTION, SOLUTION INTRAVENOUS ONCE
Status: COMPLETED | OUTPATIENT
Start: 2024-08-16 | End: 2024-08-16

## 2024-08-16 RX ORDER — PREDNISONE 20 MG/1
40 TABLET ORAL DAILY
Qty: 8 TABLET | Refills: 0 | Status: SHIPPED | OUTPATIENT
Start: 2024-08-17 | End: 2024-08-21

## 2024-08-16 RX ORDER — IPRATROPIUM BROMIDE AND ALBUTEROL SULFATE 2.5; .5 MG/3ML; MG/3ML
3 SOLUTION RESPIRATORY (INHALATION) EVERY 20 MIN
Status: COMPLETED | OUTPATIENT
Start: 2024-08-16 | End: 2024-08-16

## 2024-08-16 RX ORDER — KETOROLAC TROMETHAMINE 30 MG/ML
15 INJECTION, SOLUTION INTRAMUSCULAR; INTRAVENOUS ONCE
Status: COMPLETED | OUTPATIENT
Start: 2024-08-16 | End: 2024-08-16

## 2024-08-16 RX ADMIN — IPRATROPIUM BROMIDE AND ALBUTEROL SULFATE 3 ML: .5; 3 SOLUTION RESPIRATORY (INHALATION) at 18:58

## 2024-08-16 RX ADMIN — MAGNESIUM SULFATE HEPTAHYDRATE 2 G: 40 INJECTION, SOLUTION INTRAVENOUS at 22:36

## 2024-08-16 RX ADMIN — IPRATROPIUM BROMIDE AND ALBUTEROL SULFATE 3 ML: .5; 3 SOLUTION RESPIRATORY (INHALATION) at 19:04

## 2024-08-16 RX ADMIN — KETOROLAC TROMETHAMINE 15 MG: 30 INJECTION, SOLUTION INTRAMUSCULAR at 22:35

## 2024-08-16 RX ADMIN — IPRATROPIUM BROMIDE AND ALBUTEROL SULFATE 3 ML: .5; 3 SOLUTION RESPIRATORY (INHALATION) at 19:02

## 2024-08-16 RX ADMIN — IOHEXOL 75 ML: 350 INJECTION, SOLUTION INTRAVENOUS at 22:38

## 2024-08-16 RX ADMIN — METHYLPREDNISOLONE SODIUM SUCCINATE 125 MG: 125 INJECTION, POWDER, FOR SOLUTION INTRAMUSCULAR; INTRAVENOUS at 22:35

## 2024-08-16 ASSESSMENT — COLUMBIA-SUICIDE SEVERITY RATING SCALE - C-SSRS
2. HAVE YOU ACTUALLY HAD ANY THOUGHTS OF KILLING YOURSELF?: NO
6. HAVE YOU EVER DONE ANYTHING, STARTED TO DO ANYTHING, OR PREPARED TO DO ANYTHING TO END YOUR LIFE?: NO
1. IN THE PAST MONTH, HAVE YOU WISHED YOU WERE DEAD OR WISHED YOU COULD GO TO SLEEP AND NOT WAKE UP?: NO

## 2024-08-16 ASSESSMENT — PAIN SCALES - GENERAL
PAINLEVEL_OUTOF10: 1
PAINLEVEL_OUTOF10: 3
PAINLEVEL_OUTOF10: 3

## 2024-08-16 ASSESSMENT — PAIN DESCRIPTION - DESCRIPTORS
DESCRIPTORS: TIGHTNESS
DESCRIPTORS: TIGHTNESS

## 2024-08-16 ASSESSMENT — PAIN DESCRIPTION - ORIENTATION: ORIENTATION: MID

## 2024-08-16 ASSESSMENT — LIFESTYLE VARIABLES
HAVE YOU EVER FELT YOU SHOULD CUT DOWN ON YOUR DRINKING: NO
TOTAL SCORE: 0
EVER FELT BAD OR GUILTY ABOUT YOUR DRINKING: NO
EVER HAD A DRINK FIRST THING IN THE MORNING TO STEADY YOUR NERVES TO GET RID OF A HANGOVER: NO
HAVE PEOPLE ANNOYED YOU BY CRITICIZING YOUR DRINKING: NO

## 2024-08-16 ASSESSMENT — PAIN DESCRIPTION - LOCATION: LOCATION: CHEST

## 2024-08-16 ASSESSMENT — PAIN - FUNCTIONAL ASSESSMENT
PAIN_FUNCTIONAL_ASSESSMENT: 0-10
PAIN_FUNCTIONAL_ASSESSMENT: 0-10

## 2024-08-16 ASSESSMENT — PAIN DESCRIPTION - PROGRESSION: CLINICAL_PROGRESSION: GRADUALLY IMPROVING

## 2024-08-16 ASSESSMENT — PAIN DESCRIPTION - PAIN TYPE: TYPE: ACUTE PAIN

## 2024-08-16 NOTE — ED TRIAGE NOTES
PT. ARRIVED VIA EMS TO ED FROM [facility] FOR PAIN WITH BREATHING. PT. STATES 3/10 CHEST TIGHTNESS THAT STARTED AROUND 1200 TODAY, WORSE W/ DEEP BREATH, IS ACROSS CHEST. PT. STATES HX OF ASTHMA. PT. BREATHING UNLABORED, SPEAKING IN SHORT SENTENCES, B/L LUNG SOUNDS CLEAR/DIMINISHED, PULSE OX READING 98% ON RA. PT. STATES SHE TOOK X3 BREATHING TREATMENTS PRIOR TO COMING TO ED W/O RELIEF. PT. A&O X4, DENIES N/V/D, HA, NUMBNESS/TINGLING, DIZZINESS/LIGHTHEADEDNESS. PT. ORIENTED TO ED, CALL LIGHT IN REACH, BED ALARM ON
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
## 2024-08-17 NOTE — DISCHARGE INSTRUCTIONS
You were seen in the ED for chest pain and difficulty breathing.  This may be related to an asthma exacerbation.  Your testing was otherwise reassuring.  Follow up with your primary doctor regarding your symptoms.  Return to the ED for worsening chest pain, difficulty breathing, or any other concerning symptoms that may develop.

## 2024-08-19 ENCOUNTER — OFFICE VISIT (OUTPATIENT)
Dept: PRIMARY CARE | Facility: CLINIC | Age: 29
End: 2024-08-19
Payer: MEDICARE

## 2024-08-19 VITALS
HEART RATE: 95 BPM | SYSTOLIC BLOOD PRESSURE: 95 MMHG | RESPIRATION RATE: 16 BRPM | TEMPERATURE: 98 F | DIASTOLIC BLOOD PRESSURE: 62 MMHG | OXYGEN SATURATION: 90 %

## 2024-08-19 DIAGNOSIS — R13.11 DYSPHAGIA, ORAL PHASE: Chronic | ICD-10-CM

## 2024-08-19 DIAGNOSIS — J45.41 MODERATE PERSISTENT ASTHMA WITH EXACERBATION (HHS-HCC): Primary | ICD-10-CM

## 2024-08-19 DIAGNOSIS — R11.0 NAUSEA: ICD-10-CM

## 2024-08-19 DIAGNOSIS — R39.15 URINARY URGENCY: ICD-10-CM

## 2024-08-19 PROCEDURE — G2211 COMPLEX E/M VISIT ADD ON: HCPCS | Performed by: INTERNAL MEDICINE

## 2024-08-19 PROCEDURE — 1036F TOBACCO NON-USER: CPT | Performed by: INTERNAL MEDICINE

## 2024-08-19 PROCEDURE — 99213 OFFICE O/P EST LOW 20 MIN: CPT | Performed by: INTERNAL MEDICINE

## 2024-08-19 RX ORDER — LOPERAMIDE HYDROCHLORIDE 2 MG/1
CAPSULE ORAL
COMMUNITY
Start: 2024-08-14

## 2024-08-19 RX ORDER — ONDANSETRON 4 MG/1
TABLET, FILM COATED ORAL
COMMUNITY
Start: 2024-08-14

## 2024-08-19 RX ORDER — OMEPRAZOLE 20 MG/1
20 CAPSULE, DELAYED RELEASE ORAL
Qty: 60 CAPSULE | Refills: 0 | Status: SHIPPED | OUTPATIENT
Start: 2024-08-19

## 2024-08-19 RX ORDER — PREDNISONE 10 MG/1
TABLET ORAL
Qty: 18 TABLET | Refills: 0 | Status: SHIPPED | OUTPATIENT
Start: 2024-08-19 | End: 2024-08-27

## 2024-08-19 RX ORDER — NAPROXEN 250 MG/1
TABLET ORAL
COMMUNITY
Start: 2024-08-14

## 2024-08-19 RX ORDER — ACETYLCYSTEINE 200 MG/ML
SOLUTION ORAL; RESPIRATORY (INHALATION)
COMMUNITY
Start: 2024-08-09

## 2024-08-19 NOTE — PATIENT INSTRUCTIONS
I am increasing omeprazole to 20 mg twice a day for the month to help the stomach. After 1 month, reduce to once a day  If stomach still upset, see gastroenterologist    I am going to extend the prednisone after completion of the 40 mg for 4 days    [place] diet over the next few days    Let's check a urine test

## 2024-08-19 NOTE — PROGRESS NOTES
Subjective   Patient ID: Anisha Quevedo is a 29 y.o. female who presents for Follow-up.    HPI     Anisha went to [facility] for SOB and chest pain. She was found to have asthma exacerbation. She was given duonebs, steroids and mangesium.  She had  labs, Cxr and CTA that were unremarkbal.e She had subtle ground glass opaity due to atelactasis. She was discharged with steroids.     Here and states feels somewhat better but still some SOB  Per nursing staff, Helen she does not take deep breaths with nebs. Her pulse ox has been around 96-98%  Has not been labored  She has some nausea again and thew up over the weekend  Bowels are moving    She had kelli, fruit cup and pudding today  Review of Systems   All other systems reviewed and are negative.      Objective   BP 95/62 (BP Location: Left arm, Patient Position: Sitting, BP Cuff Size: Large adult)   Pulse 95   Temp 36.7 °C (98 °F)   Resp 16   SpO2 90%   Pulse ox improved to 94% with deep breaths  Physical Exam  Constitutional:       Appearance: Normal appearance.   Cardiovascular:      Rate and Rhythm: Normal rate and regular rhythm.      Heart sounds: Normal heart sounds. No murmur heard.     No gallop.   Pulmonary:      Effort: Pulmonary effort is normal. No respiratory distress.      Breath sounds: Normal breath sounds.      Comments: Some upper airway rhonchi  Musculoskeletal:      Right lower leg: No edema.      Left lower leg: No edema.   Neurological:      Mental Status: She is alert.         Assessment/Plan   Problem List Items Addressed This Visit             ICD-10-CM    Dysphagia, oral phase (Chronic) R13.11    Relevant Medications    omeprazole (PriLOSEC) 20 mg DR capsule    Urinary urgency R39.15    Relevant Orders    Urinalysis with Reflex Culture and Microscopic     Other Visit Diagnoses         Codes    Moderate persistent asthma with exacerbation ([facility])    -  Primary J45.41    Relevant Medications    predniSONE (Deltasone) 10 mg tablet    Nausea      R11.0    Relevant Orders    Urinalysis with Reflex Culture and Microscopic          Asthma exacerbation  -extend steroids to pulmonary visit    Nausea- check UA to rule out UTI  -increase omeprazole to bid  -followup with GI if does not work

## 2024-08-21 LAB
ATRIAL RATE: 86 BPM
P AXIS: 20 DEGREES
P OFFSET: 196 MS
P ONSET: 147 MS
PR INTERVAL: 144 MS
Q ONSET: 219 MS
QRS COUNT: 14 BEATS
QRS DURATION: 72 MS
QT INTERVAL: 346 MS
QTC CALCULATION(BAZETT): 414 MS
QTC FREDERICIA: 390 MS
R AXIS: -3 DEGREES
T AXIS: 8 DEGREES
T OFFSET: 392 MS
VENTRICULAR RATE: 86 BPM

## 2024-08-28 ENCOUNTER — APPOINTMENT (OUTPATIENT)
Dept: PULMONOLOGY | Facility: CLINIC | Age: 29
End: 2024-08-28
Payer: MEDICARE

## 2024-08-28 VITALS — BODY MASS INDEX: 35.74 KG/M2 | WEIGHT: 171 LBS

## 2024-08-28 DIAGNOSIS — J45.40 MODERATE PERSISTENT ASTHMA WITHOUT COMPLICATION (HHS-HCC): ICD-10-CM

## 2024-08-28 DIAGNOSIS — J45.41 MODERATE PERSISTENT ASTHMA WITH EXACERBATION (HHS-HCC): Primary | ICD-10-CM

## 2024-08-28 PROCEDURE — 99214 OFFICE O/P EST MOD 30 MIN: CPT

## 2024-08-28 PROCEDURE — 1036F TOBACCO NON-USER: CPT

## 2024-08-28 RX ORDER — PREDNISONE 20 MG/1
40 TABLET ORAL DAILY
Qty: 10 TABLET | Refills: 0 | Status: SHIPPED | OUTPATIENT
Start: 2024-08-28 | End: 2024-09-02

## 2024-08-28 RX ORDER — FLUTICASONE FUROATE AND VILANTEROL 200; 25 UG/1; UG/1
1 POWDER RESPIRATORY (INHALATION) DAILY
Qty: 1 EACH | Refills: 3 | Status: SHIPPED | OUTPATIENT
Start: 2024-08-28

## 2024-09-10 ENCOUNTER — APPOINTMENT (OUTPATIENT)
Dept: BEHAVIORAL HEALTH | Facility: CLINIC | Age: 29
End: 2024-09-10
Payer: MEDICARE

## 2024-09-12 ENCOUNTER — TELEPHONE (OUTPATIENT)
Dept: PULMONOLOGY | Facility: CLINIC | Age: 29
End: 2024-09-12
Payer: MEDICARE

## 2024-09-12 NOTE — TELEPHONE ENCOUNTER
Helen called to follow up about pt percussion vest orders. There is a fitting sched for Monday. Was inquiring what the frequency of these services are and details of care so that she may invite her team to training that may be involved as well for patients care.

## 2024-09-13 ENCOUNTER — APPOINTMENT (OUTPATIENT)
Dept: PRIMARY CARE | Facility: CLINIC | Age: 29
End: 2024-09-13
Payer: MEDICARE

## 2024-09-13 ENCOUNTER — TELEMEDICINE CLINICAL SUPPORT (OUTPATIENT)
Dept: PRIMARY CARE | Facility: CLINIC | Age: 29
End: 2024-09-13
Payer: MEDICARE

## 2024-09-13 DIAGNOSIS — G80.0 CP (CEREBRAL PALSY), SPASTIC, QUADRIPLEGIC (MULTI): ICD-10-CM

## 2024-09-13 NOTE — PROGRESS NOTES
Nutrition Assessment     Reason for Visit:  Anisha Quevedo is a 29 y.o. female who presents for Nutrition Counseling (Vomiting per pt)    Anthropometrics:            Food And Nutrient Intake:  Food and Nutrient History  Food and Nutrient History: Pt reports concern around vomiting. This has gotten better with increase in omeprazole. Tries to eat small, frequent meals and snacks. Staff cutting food into smaller bites for better chewing.  Energy Intake: Good > 75 %  Fluid Intake: water, crystal light, orange juice     Food Intake  Meal 1: oatmeal with chocolate milk  Meal 2: leftover protein, starch, veg or sandwich  Meal 3: potato or rice or pasta, veg and meat  Snacks: crackers, fruit                                                        Food And Nutrient Administration:                        Factors:                         Physical Activities:              Knowledge Beliefs Attitudes and Behavior                                       Nutrition Focused Physical Exam:           Energy Needs           Nutrition Diagnosis      Nutrition Diagnosis  Patient has Nutrition Diagnosis: No    Nutrition Interventions/Recommendations   Nutrition Education  Nutrition Education Content: Content related nutrition education  Goals: Discussed MNT in CP - small, frequent meals and snacks, chewing well, speech therapy prn. Discussed unknown etiology of vomiting - seems that omeprazole is helping. Discussed muscle tone with speech therapy in setting of CP.        There are no Patient Instructions on file for this visit.    Nutrition Monitoring and Evaluation   Food/Nutrient Related History Monitoring  Monitoring and Evaluation Plan: Meal/snack pattern          Time Spent  Prep time on day of patient encounter: 5 minutes  Time spent directly with patient, family or caregiver: 30 minutes  Additional Time Spent on Patient Care Activities: 0 minutes  Documentation Time: 10 minutes  Other Time Spent: 0 minutes  Total: 45  minutes

## 2024-09-26 ENCOUNTER — APPOINTMENT (OUTPATIENT)
Dept: OBSTETRICS AND GYNECOLOGY | Facility: CLINIC | Age: 29
End: 2024-09-26
Payer: MEDICARE

## 2024-09-26 VITALS
WEIGHT: 170 LBS | BODY MASS INDEX: 35.68 KG/M2 | SYSTOLIC BLOOD PRESSURE: 114 MMHG | HEIGHT: 58 IN | DIASTOLIC BLOOD PRESSURE: 74 MMHG

## 2024-09-26 DIAGNOSIS — L29.2 ITCHING OF VULVA: Primary | ICD-10-CM

## 2024-09-26 PROCEDURE — 1036F TOBACCO NON-USER: CPT | Performed by: ADVANCED PRACTICE MIDWIFE

## 2024-09-26 PROCEDURE — 3008F BODY MASS INDEX DOCD: CPT | Performed by: ADVANCED PRACTICE MIDWIFE

## 2024-09-26 PROCEDURE — 99213 OFFICE O/P EST LOW 20 MIN: CPT | Performed by: ADVANCED PRACTICE MIDWIFE

## 2024-09-26 PROCEDURE — 87205 SMEAR GRAM STAIN: CPT

## 2024-09-26 RX ORDER — NYSTATIN 100000 [USP'U]/G
1 POWDER TOPICAL AS NEEDED
Qty: 15 G | Refills: 3 | Status: SHIPPED | OUTPATIENT
Start: 2024-09-26 | End: 2025-09-26

## 2024-09-26 ASSESSMENT — PAIN SCALES - GENERAL: PAINLEVEL: 0-NO PAIN

## 2024-09-26 NOTE — PATIENT INSTRUCTIONS
Please use the Nystatin powder in the groin area as needed for itching.   Continue to use cotton underwear and dove sensitive soap.   We discussed sleeping at night without under garments for comfort.

## 2024-09-26 NOTE — PROGRESS NOTES
Pt here for vaginal itching     Chaperone declined: Machelle Padilla, CM3    Subjective   Patient ID: Anisha Quevedo is a 29 y.o. female who presents for No chief complaint on file..  HPI    Brought in from group home  Reports vaginal itching  No discharge   Chronic issue for her, has had multiple visits    She reports she showers daily and uses dove sensitive soap   Cotton underwear  Has a female roommate     Wheelchair bound     Review of Systems    Objective   Physical Exam  Constitutional:       General: She is awake.      Appearance: She is ill-appearing.      Interventions: She is restrained.      Comments: Wheelchair bound    Genitourinary:     Comments: Difficultly doing exam in wheelchair    Bilateral moisture with some irritation noted inguinally  No apparent vaginal discharge     Blind swab done   Psychiatric:         Behavior: Behavior is cooperative.         Assessment/Plan   Problem List Items Addressed This Visit             ICD-10-CM       Medium    Itching of vulva - Primary L29.2    Relevant Medications    nystatin (Mycostatin) 100,000 unit/gram powder            JESSICA Gonsales-RAJIV 09/26/24 1:58 PM

## 2024-09-28 LAB
BACTERIAL VAGINOSIS VAG-IMP: NORMAL
CLUE CELLS VAG LPF-#/AREA: NORMAL /[LPF]
NUGENT SCORE: 4
YEAST VAG WET PREP-#/AREA: NORMAL

## 2024-09-30 ENCOUNTER — OFFICE VISIT (OUTPATIENT)
Dept: BEHAVIORAL HEALTH | Facility: CLINIC | Age: 29
End: 2024-09-30
Payer: MEDICARE

## 2024-09-30 ENCOUNTER — APPOINTMENT (OUTPATIENT)
Dept: BEHAVIORAL HEALTH | Facility: CLINIC | Age: 29
End: 2024-09-30
Payer: MEDICARE

## 2024-09-30 ENCOUNTER — APPOINTMENT (OUTPATIENT)
Dept: PRIMARY CARE | Facility: CLINIC | Age: 29
End: 2024-09-30
Payer: MEDICARE

## 2024-09-30 DIAGNOSIS — F41.9 ANXIETY: ICD-10-CM

## 2024-09-30 DIAGNOSIS — G82.50 SPASTIC QUADRIPLEGIA (MULTI): ICD-10-CM

## 2024-09-30 DIAGNOSIS — G80.0 CP (CEREBRAL PALSY), SPASTIC, QUADRIPLEGIC (MULTI): ICD-10-CM

## 2024-09-30 DIAGNOSIS — F20.0 PARANOID SCHIZOPHRENIA (MULTI): Primary | Chronic | ICD-10-CM

## 2024-09-30 DIAGNOSIS — F70 MILD INTELLECTUAL DISABILITY: ICD-10-CM

## 2024-09-30 PROBLEM — R20.2 TINGLING OF LEFT UPPER EXTREMITY: Status: RESOLVED | Noted: 2023-06-28 | Resolved: 2024-09-30

## 2024-09-30 PROBLEM — J45.901 ACUTE EXACERBATION OF IGE MEDIATED ALLERGIC ASTHMA (HHS-HCC): Status: RESOLVED | Noted: 2024-05-25 | Resolved: 2024-09-30

## 2024-09-30 PROCEDURE — 99214 OFFICE O/P EST MOD 30 MIN: CPT | Performed by: PSYCHIATRY & NEUROLOGY

## 2024-09-30 PROCEDURE — 1036F TOBACCO NON-USER: CPT | Performed by: PSYCHIATRY & NEUROLOGY

## 2024-09-30 PROCEDURE — 90833 PSYTX W PT W E/M 30 MIN: CPT | Performed by: PSYCHIATRY & NEUROLOGY

## 2024-09-30 ASSESSMENT — ENCOUNTER SYMPTOMS
SLEEP DISTURBANCE: 0
VOMITING: 0
DYSURIA: 0
ABDOMINAL DISTENTION: 0
DIZZINESS: 0
TREMORS: 0
CHOKING: 0
SHORTNESS OF BREATH: 0
NAUSEA: 0
PALPITATIONS: 0
SEIZURES: 0

## 2024-09-30 NOTE — PROGRESS NOTES
Outpatient Adult IDD Psychiatry    A HIPAA-compliant interactive audio and video telecommunication system which permits real time communications between the patient (at the originating site) and provider (at the distant site) was utilized to provide this telehealth service.     The patient, family, caregivers and guardian (as appropriate) have provided consent to conduct treatment via this telehealth service.  The patient's identity and physical location were verified at the time of this visit.      Present for appointment: Anisha and [facility] OSWALDO (Wilfrido).    SUBJECTIVE    Anisha has generally been well over the past 2-3 months.    She was seen in the ED at [facility] on 8/16/24 for chest pain and difficulty breathing felt to be secondary to a mild asthma exacerbation.  She is now using a HFCWO device (vest) to help clear/manage secretions.    She was also seen by OB/GYN on 9/26/24 for recurrence of vaginitis symptoms.    She has been staying busy and active through [facility] and is looking forward to an upcoming Halloween party.    They continue to explore options for her get involved (eg, volunteering with [employer] or [employer]).    She's had fairly recent visits from two of her brothers and a sister.    Overall, seems to be in a good mood; denies any symptoms of depression.  Not feeling excessively anxious.    Still has occasional instances of A/V hallucinations, but fair reality testing and improved ability to talk about these experiences with staff when they happen.  No overt paranoia or other delusional thinking elicited today.    Seems to be sleeping well at night, without excessive daytime sleepiness.    Appetite has been good -- no current GI problems reported.    No tremors or other abnormal movements reported by Anisha or observed by  staff.    Review of Systems   HENT:  Negative for drooling and trouble swallowing.    Respiratory:  Positive for cough (percussion vest). Negative for  choking and shortness of breath.    Cardiovascular:  Negative for chest pain and palpitations.   Gastrointestinal:  Negative for abdominal distention, nausea and vomiting.   Genitourinary:  Negative for dysuria and vaginal pain.   Neurological:  Negative for dizziness, tremors and seizures.   Psychiatric/Behavioral:  Positive for hallucinations. Negative for sleep disturbance.      MEDICATION HISTORY  Quetiapine - ineffective & overly sedating    CURRENT MEDICATIONS    Current Outpatient Medications:     acetaminophen (Tylenol) 325 mg tablet, Take 1-2 tablets (325-650 mg) by mouth every 4 hours if needed for fever (temp greater than 38.0 C) (or pain). No more than 3000 mg per day, Disp: , Rfl:     acetylcysteine (Mucomyst) 200 mg/mL (20 %) nebulizer solution, , Disp: , Rfl:     albuterol 2.5 mg /3 mL (0.083 %) nebulizer solution, Take 3 mL (2.5 mg) by nebulization every 4 hours if needed for wheezing., Disp: 300 mL, Rfl: 1    alum-mag hydroxide-simeth (Maalox MAX) 400-400-40 mg/5 mL suspension, Take 15 mL by mouth once daily as needed., Disp: , Rfl:     ammonium lactate (Amlactin) 12 % cream, Apply 1 Application topically once daily. Apply to right heal, Disp: , Rfl:     benzonatate (Tessalon) 100 mg capsule, Take 1 capsule (100 mg) by mouth 3 times a day as needed for cough. Do not crush or chew., Disp: 20 capsule, Rfl: 0    cholecalciferol (Vitamin D3) 50 MCG (2000 UT) tablet, Take 1 tablet (50 mcg) by mouth once daily., Disp: 30 tablet, Rfl: 11    ciclopirox (Loprox) 0.77 % gel, Apply 1 Application topically once daily. Apply to right great toenail, Disp: , Rfl:     docusate sodium (Colace) 100 mg capsule, Take 1 capsule (100 mg) by mouth 2 times a day as needed for constipation. Over the Counter, Disp: 60 capsule, Rfl: 1    famotidine (Pepcid) 20 mg tablet, Take 1 tablet (20 mg) by mouth once daily. Take on an empty stomach, Disp: 90 tablet, Rfl: 0    ferrous sulfate 325 (65 Fe) MG tablet, Take 1 tablet (325 mg)  by mouth once daily., Disp: , Rfl:     fluticasone (Flonase) 50 mcg/actuation nasal spray, Administer 2 sprays into each nostril once daily. Shake gently. Before first use, prime pump. After use, clean tip and replace cap., Disp: 16 g, Rfl: 12    fluticasone furoate-vilanteroL (Breo Ellipta) 200-25 mcg/dose inhaler, Inhale 1 puff once daily. Rinse mouth with water after use to reduce aftertaste and incidence of candidiasis. Do not swallow., Disp: 1 each, Rfl: 3    hydrOXYzine HCL (Atarax) 50 mg tablet, Take 1 tablet (50 mg) by mouth once daily at bedtime., Disp: 30 tablet, Rfl: 11    inhaler,assist device,lg mask (AEROCHAMBER PLUS FLOW-VU,L MSK MISC), Use as directed with inhaler, Disp: , Rfl:     ipratropium-albuteroL (Duo-Neb) 0.5-2.5 mg/3 mL nebulizer solution, Take 3 mL by nebulization 3 times a day., Disp: 180 mL, Rfl: 2    loperamide (Imodium) 2 mg capsule, , Disp: , Rfl:     melatonin 5 mg capsule, Take 1 capsule (5 mg) by mouth once daily at bedtime., Disp: 30 capsule, Rfl: 11    metoprolol tartrate (Lopressor) 25 mg tablet, Take 0.5 tablets (12.5 mg) by mouth 2 times a day., Disp: , Rfl:     montelukast (Singulair) 10 mg tablet, Take 1 tablet (10 mg) by mouth once daily at bedtime., Disp: , Rfl:     multivitamin tablet, Take 1 tablet by mouth every other day., Disp: , Rfl:     mupirocin (Bactroban) 2 % ointment, Apply a pea-size amount inside both nostrils once a day, Disp: , Rfl:     naproxen (Naprosyn) 250 mg tablet, , Disp: , Rfl:     nystatin (Mycostatin) 100,000 unit/gram powder, Apply 1 Application topically if needed for itching., Disp: 15 g, Rfl: 3    omeprazole (PriLOSEC) 20 mg DR capsule, Take 1 capsule (20 mg) by mouth 2 times a day before meals., Disp: 60 capsule, Rfl: 0    ondansetron (Zofran) 4 mg tablet, , Disp: , Rfl:     polyethylene glycol (Glycolax, Miralax) 17 gram/dose powder, Take 17 g by mouth 2 times a day. Over the counter, Disp: 850 g, Rfl: 11    Refresh Plus 0.5 % ophthalmic  solution, Administer 1 drop into both eyes 2 times a day., Disp: , Rfl:     risperiDONE (RisperDAL) 0.5 mg tablet, Take 1 tablet (0.5 mg) by mouth twice daily - morning and 2pm., Disp: 60 tablet, Rfl: 11    risperiDONE (RisperDAL) 1 mg tablet, Take 0.5 tablets (0.5 mg) by mouth once daily at bedtime., Disp: 15 tablet, Rfl: 11    risperiDONE (RisperDAL) 3 mg tablet, Take 1 tablet (3 mg) by mouth once daily at bedtime., Disp: 30 tablet, Rfl: 11    sodium chloride (Nasal Moisturizing) 0.65 % nasal spray, Administer 1 spray into each nostril 2 times a day as needed (for dryness)., Disp: , Rfl:     SOCIAL HISTORY  Living situation [facility]   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[facility]   Work or day program [employer] 5 days/week   School N/A   Guardianship Self   SSA N/A   Bx Specialist N/A   Nicotine None   Alcohol None   Other drugs None     OBJECTIVE    Lab Results   Component Value Date    HGB 14.4 08/16/2024     08/16/2024    NEUTROABS 3.22 08/16/2024    GLUCOSE 80 08/16/2024     08/16/2024    K 4.2 08/16/2024    CO2 27 08/16/2024    CALCIUM 10.1 08/16/2024    CREATININE 0.47 (L) 08/16/2024    AST 15 08/16/2024    ALT 16 08/16/2024    TSH 2.10 06/07/2024    FREET4 0.86 11/22/2023    CHOL 173 11/22/2023    LDLF 78 10/07/2020    TRIG 37 11/22/2023    IMRGQARW31 716 11/22/2023    VITD25 23 (L) 11/22/2023     Therapeutic Drug Monitoring  N/A    Electrocardiograms  08/16/2024: NSR, VR 86, QTc 414  06/10/2024: NSR, non-sp ST/TW abn, , QTc 394  12/06/2023: NSR, VR 93, QTc 425  05/19/2021: NSR, VR 80, QTc 405    MENTAL STATUS EXAM  General/Appearance: Appropriate dress/hygiene/grooming.  Attitude/Behavior: Average eye contact. Calm/pleasant.  Speech/Communication: Normal volume/rate/tone. Spastic.  Motor: Spasticity.  Gait: In wheelchair.  Mood: Appears to be in good spirits.  Affect: Congruent.  Thought processes: Organized/coherent.  Thought content: No obsessions. No delusions.  Perception: Does not  appear to be experiencing or responding to hallucinations. Does not report any auditory or visual hallucinations.  Sensorium/Cognition: Alert, awake, oriented to person/place/time. Intellectual impairment.  Memory: Recent & remote recall is intact.  Insight: Fair.  Judgment: Good.     ASSESSMENT  Anisha has remained stable from a psychiatric/behavioral standpoint -- no treatment changes indicated at this visit.    30 minutes of additional time was spent during today's appointment in supportive therapy with Anisha discussing:  > strategies to minimize distress associated with symptoms of psychosis  > psychoeducation about illness and expected course/prognosis  > behavioral or environmental changes to address problem(s)    PROBLEM LIST  Intellectual developmental disorder, mild  Schizophrenia  Anxiety disorder, r/o PTSD    PLAN  -- Continue risperidone 0.5mg - 0.5mg - 3.5mg (AM, 14:00, HS) for psychosis  -- Continue melatonin 5mg at bedtime for sleep  -- Continue hydroxyzine 50mg at bedtime for sleep and anxiety  -- Follow up 3 months    Matt Stevens MD    Prep time on date of the patient encounter: -- minutes   Time spent directly with patient/family/caregiver: 50 minutes   Additional time spent on patient care activities: -- minutes   Documentation time: -- minutes   Other time spent: -- minutes   Total time on date of patient encounter: -- minutes

## 2024-10-01 ASSESSMENT — ENCOUNTER SYMPTOMS
HALLUCINATIONS: 1
TROUBLE SWALLOWING: 0
COUGH: 1

## 2024-10-17 ENCOUNTER — APPOINTMENT (OUTPATIENT)
Dept: BEHAVIORAL HEALTH | Facility: CLINIC | Age: 29
End: 2024-10-17
Payer: MEDICARE

## 2024-10-17 DIAGNOSIS — F41.9 ANXIETY: ICD-10-CM

## 2024-10-17 DIAGNOSIS — F70 MILD INTELLECTUAL DISABILITY: ICD-10-CM

## 2024-10-17 PROCEDURE — 90834 PSYTX W PT 45 MINUTES: CPT | Performed by: COUNSELOR

## 2024-10-17 NOTE — PROGRESS NOTES
Total Time: 46 min  Diagnosis: Anxiety (300.00) (F41.9), Mild intellectual disability (317) (F70)  Visit Type: epic  Reason for visit: managing her worry     - notes things have been good, she is working out regularly and joined an advocacy group which she is really happy about  - she notes she is still going to art group and has recently got into baking and finds it therapeutic   - talked to her sister recently but hasn't seen her  - notes they will have a Halloween party next week  - reports she is listening to a lot of [employer]eeze[employer] music and notes its helping     focused on:  - active listening  - problem solving  - coping skills: focusing on music, guided mediation, grounding techniques, mindfulness, affirmations

## 2024-10-30 ENCOUNTER — PATIENT MESSAGE (OUTPATIENT)
Dept: PRIMARY CARE | Facility: CLINIC | Age: 29
End: 2024-10-30

## 2024-10-30 ENCOUNTER — APPOINTMENT (OUTPATIENT)
Dept: PULMONOLOGY | Facility: CLINIC | Age: 29
End: 2024-10-30
Payer: MEDICARE

## 2024-10-30 VITALS
HEART RATE: 92 BPM | RESPIRATION RATE: 18 BRPM | DIASTOLIC BLOOD PRESSURE: 83 MMHG | BODY MASS INDEX: 35.05 KG/M2 | OXYGEN SATURATION: 94 % | HEIGHT: 58 IN | WEIGHT: 167 LBS | TEMPERATURE: 98.1 F | SYSTOLIC BLOOD PRESSURE: 134 MMHG

## 2024-10-30 DIAGNOSIS — J45.40 MODERATE PERSISTENT ASTHMA WITHOUT COMPLICATION (HHS-HCC): Primary | ICD-10-CM

## 2024-10-30 DIAGNOSIS — G80.0 CP (CEREBRAL PALSY), SPASTIC, QUADRIPLEGIC (MULTI): Primary | ICD-10-CM

## 2024-10-30 DIAGNOSIS — R07.9 CHEST PAIN AT REST: ICD-10-CM

## 2024-10-30 PROCEDURE — 3008F BODY MASS INDEX DOCD: CPT

## 2024-10-30 PROCEDURE — 99214 OFFICE O/P EST MOD 30 MIN: CPT

## 2024-10-30 PROCEDURE — 1036F TOBACCO NON-USER: CPT

## 2024-10-30 ASSESSMENT — PAIN SCALES - GENERAL: PAINLEVEL_OUTOF10: 8

## 2024-11-22 ENCOUNTER — APPOINTMENT (OUTPATIENT)
Dept: ORTHOPEDIC SURGERY | Facility: CLINIC | Age: 29
End: 2024-11-22
Payer: MEDICARE

## 2024-11-26 ENCOUNTER — APPOINTMENT (OUTPATIENT)
Dept: BEHAVIORAL HEALTH | Facility: CLINIC | Age: 29
End: 2024-11-26
Payer: COMMERCIAL

## 2024-11-26 DIAGNOSIS — F70 MILD INTELLECTUAL DISABILITY: ICD-10-CM

## 2024-11-26 DIAGNOSIS — F41.9 ANXIETY: ICD-10-CM

## 2024-11-26 PROCEDURE — 90834 PSYTX W PT 45 MINUTES: CPT | Performed by: COUNSELOR
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
## 2024-11-27 ENCOUNTER — OFFICE VISIT (OUTPATIENT)
Dept: OPHTHALMOLOGY | Facility: CLINIC | Age: 29
End: 2024-11-27
Payer: MEDICARE

## 2024-11-27 ENCOUNTER — APPOINTMENT (OUTPATIENT)
Dept: OPHTHALMOLOGY | Facility: CLINIC | Age: 29
End: 2024-11-27
Payer: COMMERCIAL

## 2024-11-27 DIAGNOSIS — H52.223 REGULAR ASTIGMATISM OF BOTH EYES: Primary | ICD-10-CM

## 2024-11-27 DIAGNOSIS — H02.88A MEIBOMIAN GLAND DYSFUNCTION (MGD) OF UPPER AND LOWER EYELID OF RIGHT EYE: ICD-10-CM

## 2024-11-27 DIAGNOSIS — H02.88B MEIBOMIAN GLAND DYSFUNCTION (MGD) OF UPPER AND LOWER EYELID OF LEFT EYE: ICD-10-CM

## 2024-11-27 DIAGNOSIS — H52.13 MYOPIA OF BOTH EYES: ICD-10-CM

## 2024-11-27 DIAGNOSIS — H16.223 KERATOCONJUNCTIVITIS SICCA OF BOTH EYES NOT DUE TO SJOGREN'S SYNDROME: ICD-10-CM

## 2024-11-27 PROCEDURE — 92014 COMPRE OPH EXAM EST PT 1/>: CPT | Performed by: OPTOMETRIST

## 2024-11-27 ASSESSMENT — ENCOUNTER SYMPTOMS
ALLERGIC/IMMUNOLOGIC NEGATIVE: 0
PSYCHIATRIC NEGATIVE: 0
NEUROLOGICAL NEGATIVE: 0
HEMATOLOGIC/LYMPHATIC NEGATIVE: 0
RESPIRATORY NEGATIVE: 0
CARDIOVASCULAR NEGATIVE: 0
ENDOCRINE NEGATIVE: 0
MUSCULOSKELETAL NEGATIVE: 1
EYES NEGATIVE: 1
GASTROINTESTINAL NEGATIVE: 0
CONSTITUTIONAL NEGATIVE: 0

## 2024-11-27 ASSESSMENT — SLIT LAMP EXAM - LIDS: COMMENTS: NORMAL, TR MGD

## 2024-11-27 ASSESSMENT — REFRACTION
OS_SPHERE: -0.25
OD_SPHERE: -0.25
OS_AXIS: 090
OD_CYLINDER: -0.50
OS_CYLINDER: -1.00
OD_AXIS: 090

## 2024-11-27 ASSESSMENT — CONF VISUAL FIELD: METHOD: COUNTING FINGERS

## 2024-11-27 ASSESSMENT — REFRACTION_MANIFEST
OD_AXIS: 090
OS_AXIS: 090
OD_CYLINDER: -0.50
OD_SPHERE: -0.25
OS_CYLINDER: -1.00
OS_SPHERE: PLANO

## 2024-11-27 ASSESSMENT — EXTERNAL EXAM - RIGHT EYE: OD_EXAM: NORMAL

## 2024-11-27 ASSESSMENT — VISUAL ACUITY
OD_CC: J1
OS_CC: J1
CORRECTION_TYPE: GLASSES
METHOD: SNELLEN - LINEAR
OS_CC: 20/20
OD_CC: 20/20

## 2024-11-27 ASSESSMENT — CUP TO DISC RATIO
OD_RATIO: 0.4
OS_RATIO: 0.4

## 2024-11-27 ASSESSMENT — REFRACTION_WEARINGRX
OD_AXIS: 090
OD_CYLINDER: -0.50
OD_SPHERE: -0.25
OS_CYLINDER: -1.00
OS_SPHERE: PLANO
OS_AXIS: 090

## 2024-11-27 ASSESSMENT — TONOMETRY: IOP_METHOD: GOLDMANN APPLANATION

## 2024-11-27 ASSESSMENT — EXTERNAL EXAM - LEFT EYE: OS_EXAM: NORMAL

## 2024-11-27 NOTE — PROGRESS NOTES
Assessment/Plan   Diagnoses and all orders for this visit:  Regular astigmatism of both eyes  Myopia of both eyes  New spec rx released today per patient request. Ocular health wnl for age OU. Monitor 1 year or sooner prn. Refraction billed today. Pt consents to receiving glasses Rx today. Patient's/guardian's signature obtained to acknowledge and confirm that a paper copy of glasses Rx was given to patient in compliance with [facility] Eyeglass Rule. Electronic copy of Rx will also be available via [employer].   Patient experiencing migraine headache during DFE, peripheral views were limited.    Keratoconjunctivitis sicca of both eyes not due to Sjogren's syndrome  Meibomian gland dysfunction (MGD) of upper and lower eyelid of left eye  Meibomian gland dysfunction (MGD) of upper and lower eyelid of right eye  Patient asymptomatic. Option for ATs and WC prn. Minimal signs on exam today.

## 2024-12-05 ENCOUNTER — CLINICAL SUPPORT (OUTPATIENT)
Dept: AUDIOLOGY | Facility: CLINIC | Age: 29
End: 2024-12-05
Payer: MEDICARE

## 2024-12-05 ENCOUNTER — OFFICE VISIT (OUTPATIENT)
Dept: OTOLARYNGOLOGY | Facility: CLINIC | Age: 29
End: 2024-12-05
Payer: MEDICARE

## 2024-12-05 VITALS — BODY MASS INDEX: 37.36 KG/M2 | TEMPERATURE: 97.6 F | HEIGHT: 58 IN | WEIGHT: 178 LBS

## 2024-12-05 DIAGNOSIS — J02.9 SORE THROAT: ICD-10-CM

## 2024-12-05 DIAGNOSIS — H90.3 SENSORINEURAL HEARING LOSS (SNHL), BILATERAL: Primary | ICD-10-CM

## 2024-12-05 DIAGNOSIS — G80.0 CP (CEREBRAL PALSY), SPASTIC, QUADRIPLEGIC (MULTI): ICD-10-CM

## 2024-12-05 DIAGNOSIS — H90.3 BILATERAL SENSORINEURAL HEARING LOSS: Primary | ICD-10-CM

## 2024-12-05 PROCEDURE — 92557 COMPREHENSIVE HEARING TEST: CPT | Performed by: AUDIOLOGIST

## 2024-12-05 PROCEDURE — 99213 OFFICE O/P EST LOW 20 MIN: CPT | Performed by: NURSE PRACTITIONER

## 2024-12-05 PROCEDURE — 1036F TOBACCO NON-USER: CPT | Performed by: NURSE PRACTITIONER

## 2024-12-05 PROCEDURE — 92550 TYMPANOMETRY & REFLEX THRESH: CPT | Performed by: AUDIOLOGIST

## 2024-12-05 PROCEDURE — 3008F BODY MASS INDEX DOCD: CPT | Performed by: NURSE PRACTITIONER

## 2024-12-05 PROCEDURE — 99203 OFFICE O/P NEW LOW 30 MIN: CPT | Performed by: NURSE PRACTITIONER

## 2024-12-05 SDOH — ECONOMIC STABILITY: FOOD INSECURITY: WITHIN THE PAST 12 MONTHS, THE FOOD YOU BOUGHT JUST DIDN'T LAST AND YOU DIDN'T HAVE MONEY TO GET MORE.: NEVER TRUE

## 2024-12-05 SDOH — ECONOMIC STABILITY: FOOD INSECURITY: WITHIN THE PAST 12 MONTHS, YOU WORRIED THAT YOUR FOOD WOULD RUN OUT BEFORE YOU GOT MONEY TO BUY MORE.: NEVER TRUE

## 2024-12-05 ASSESSMENT — PATIENT HEALTH QUESTIONNAIRE - PHQ9
1. LITTLE INTEREST OR PLEASURE IN DOING THINGS: NOT AT ALL
SUM OF ALL RESPONSES TO PHQ9 QUESTIONS 1 AND 2: 0
2. FEELING DOWN, DEPRESSED OR HOPELESS: NOT AT ALL

## 2024-12-05 ASSESSMENT — LIFESTYLE VARIABLES
HOW OFTEN DO YOU HAVE SIX OR MORE DRINKS ON ONE OCCASION: NEVER
HOW MANY STANDARD DRINKS CONTAINING ALCOHOL DO YOU HAVE ON A TYPICAL DAY: PATIENT DOES NOT DRINK
AUDIT-C TOTAL SCORE: 0
SKIP TO QUESTIONS 9-10: 1
HOW OFTEN DO YOU HAVE A DRINK CONTAINING ALCOHOL: NEVER

## 2024-12-05 ASSESSMENT — ENCOUNTER SYMPTOMS: DEPRESSION: 0

## 2024-12-05 ASSESSMENT — PAIN SCALES - GENERAL: PAINLEVEL_OUTOF10: 0-NO PAIN

## 2024-12-05 NOTE — PROGRESS NOTES
"AUDIOLOGY ADULT AUDIOMETRIC EVALUATION      Name:  Anisha Quevedo  :  1995  Age:  29 y.o.  Date of Evaluation:  2024    HISTORY  Reason for visit:  hearing loss  Ms. Quevedo is seen 2024 at the request of  Shelia Garcia MD for an evaluation of hearing.  (Saw Agustina Stanton CNP today)    Chief complaint:    Hearing loss    Hearing loss:  has not noticed any change in hearing  Tinnitus:   denies   Otitis Media: denies   Otologic surgical history:  denies   Otalgia:  denies   Ear pressure/fullness:  when she is sick; not today  History of excessive noise exposure:  music   Other:  experiences cerebral palsy    Hearing aid history: wears bilateral hearing aids which are currently not working          EVALUATION  Please find audiogram in \"Media\" tab (Document Type:  Audiology Report) or included at the bottom of this note.    RESULTS   Otoscopic Evaluation: clear canals bilaterally      Immittance Measures (226 Hz probe tone):   Tympanometry is consistent with normal middle ear pressure and normal tympanic membrane mobility bilaterally.       Ipsilateral acoustic reflexes were present for 500-4000 Hz bilaterally.       Test technique:  standard behavioral technique via T[facility] earphones.  Reliability is good.    Pure Tone Audiometry:  Hearing sensitivity is in the mild to moderate hearing loss range bilaterally.       Speech Audiometry:        Right Ear:  Speech Reception Threshold (SRT) was obtained at 45 dBHL                 Speech discrimination score was 100% in quiet when words were presented at 95 dBHL (10 item NU-6 ordered-by-difficulty list).        Left Ear:  Speech Reception Threshold (SRT) was obtained at 45 dBHL                 Speech discrimination score was 92% in quiet when words were presented at 95 dBHL (25 item NU-6 ordered-by-difficulty list).      IMPRESSIONS:  In comparison with previous audiogram of 10/11/2023, hearing is stable bilaterally.      Patient is expected to have communication " difficulty in adverse listening environments.    Patient is expected to continue to benefit from devices that provide amplification (e.g., hearing aids) and improve the desired sound signal over that of background noise, as well as from effective communication strategies.       RECOMMENDATIONS  Continue with medical follow-up with Agustina Stanton CNP.  Follow up with fitter of hearing aids for hearing aid service as needed.   Reassess hearing in 1 year (or sooner if medically indicated or if there is a concern for a change in hearing).    Continue with medical follow-up as indicated.       PATIENT EDUCATION  Discussed results and recommendations with patient and .  Questions were addressed and the patient was encouraged to contact our department should concerns arise.       LUIS Rudolph, CCC-A  Licensed Audiologist

## 2024-12-05 NOTE — PROGRESS NOTES
Subjective   Patient ID: Anisha Quevedo is a 29 y.o. female who presents for ear cleaning.    HPI  Patient here for ear cleaning. Accompanied by caregiver. No new ear complaints.     A few months ago she was sick and her tonsils are enlarged. No issues today.    Patient Active Problem List   Diagnosis    Abnormal posture    Abnormal TSH    Abnormal uterine bleeding (AUB)    Allergic rhinitis    Chronic rhinitis    Anemia    Ankle arthritis    Anxiety    Asthma    Bilateral sensorineural hearing loss    Bilateral tinnitus    CP (cerebral palsy), spastic, quadriplegic (Multi)    Chronic tension-type headache, not intractable    Constipation    Contracture of wrist    Developmental dysplasia of hip (HHS-HCC)    Dry lips    Dysphagia, oral phase    Oropharyngeal dysphagia    Elevated vitamin B12 level    Excessive daytime sleepiness    External constriction of wrist    Gallstones    Gastrocutaneous fistula    GERD (gastroesophageal reflux disease)    Hematuria    Hypertrophy of both inferior nasal turbinates    Insomnia    Keratoconjunctivitis sicca of both eyes not due to Sjogren's syndrome    Meibomian gland dysfunction (MGD) of upper and lower eyelid of left eye    Meibomian gland dysfunction (MGD) of upper and lower eyelid of right eye    Mild intellectual disability    Myopia of both eyes with regular astigmatism    Nasal burning    Nasal mucosa dry    Numbness, limb    Decreased coordination    Pressure injury of skin    Snoring    Unable to walk    Left arm weakness    Urinary incontinence in female    Weakness generalized    Birth deformity    Iron deficiency    Muscle weakness (generalized)    Scoliosis    Tachycardia    Vitamin D deficiency    Spastic quadriplegia (Multi)    Schizophrenia    High risk medication use    Severe obesity (BMI 35.0-35.9 with comorbidity) (Multi)    Shortness of breath    Other constipation    Itching of vulva     Past Surgical History:   Procedure Laterality Date     ESOPHAGOGASTRODUODENOSCOPY  05/29/2024    OTHER SURGICAL HISTORY  03/03/2021    Hip surgery    OTHER SURGICAL HISTORY  08/22/2019    Percutaneous endoscopic gastrostomy tube insertion    OTHER SURGICAL HISTORY  02/19/2020    Heart surgery    SPINAL FIXATION SURGERY  07/25/2014    Spinal Arthrodesis For Deformity By Posterior Approach     Review of Systems    All other systems have been reviewed and are negative for complaints except for those mentioned in history of present illness, past medical history and problem list.    Objective   Physical Exam    Constitutional: No fever, chills, weight loss or weight gain  General appearance: Appears well, well-nourished, well groomed. No acute distress.    Communication: Normal communication    Psychiatric: Oriented to person, place and time. Normal mood and affect.    Neurologic: Cranial nerves II-XII grossly intact and symmetric bilaterally.    Head and Face:  Head: Atraumatic with no masses, lesions or scarring.  Face: Normal symmetry. No scars or deformities.  TMJ: Normal, no trismus.    Eyes: Conjunctiva not edematous or erythematous.     Right Ear: External inspection of ear with no deformity, scars, or masses. EAC is clear.  TM is intact with no sign of infection, effusion, or retraction.  No perforation seen.     Left Ear: External inspection of ear with no deformity, scars, or masses. EAC is clear.  TM is intact with no sign of infection, effusion, or retraction.  No perforation seen.     Nose: External inspection of nose: No nasal lesions, lacerations or scars. Anterior rhinoscopy with limited visualization past the inferior turbinates. No tenderness on frontal or maxillary sinus palpation.    Oral Cavity/Mouth: Oral cavity and oropharynx mucosa moist and pink. No lesions or masses. Tonsils appear normal. Uvula is midline. Tongue with no masses or lesions. Tongue with good mobility. The oropharynx is clear.    Neck: Normal appearing, symmetric, trachea midline.      Cardiovascular: Examination of peripheral vascular system shows no clubbing or cyanosis.    Respiratory: No respiratory distress increased work of breathing. Inspection of the chest with symmetric chest expansion and normal respiratory effort.    Skin: No head and neck rashes.    Lymph nodes: No adenopathy.     Diagnostic Results     Assessment/Plan   Diagnoses and all orders for this visit:  Bilateral sensorineural hearing loss  Sore throat  Reassurance given. Audiogram reviewed.  Follow up in 1 year.  All questions answered to patient satisfaction.        JESSICA Pearson-CNP 12/05/24 10:32 AM

## 2024-12-11 ENCOUNTER — APPOINTMENT (OUTPATIENT)
Dept: GASTROENTEROLOGY | Facility: CLINIC | Age: 29
End: 2024-12-11
Payer: MEDICARE

## 2024-12-12 ENCOUNTER — APPOINTMENT (OUTPATIENT)
Dept: ORTHOPEDIC SURGERY | Facility: CLINIC | Age: 29
End: 2024-12-12
Payer: MEDICARE

## 2024-12-12 DIAGNOSIS — G80.0 CP (CEREBRAL PALSY), SPASTIC, QUADRIPLEGIC (MULTI): Primary | ICD-10-CM

## 2024-12-12 NOTE — PROGRESS NOTES
Physical Therapy    Physical Therapy Evaluation and Treatment      Patient Name: Anisha Quevedo  MRN: [id number]  Today's Date: 12/13/2024    Time Entry:   Time Calculation  Start Time: 0900  Stop Time: 0915  Time Calculation (min): 15 min  PT Evaluation Time Entry  PT Evaluation (Low) Time Entry: 15                      Insurance: [facility] A/B 90D  -$86.52 used    Visit Number: 1    Assessment:   Patient is a 29 y.o. female who presents to clinic accompanied by caregiver from [facility]. Patient is currently functioning at baseline in home environment, including mobilizing in power wheelchair w/ independent use of joystick and steering and Luisana lift for transfers. Subjective and objective examination reveals no new impairments, activity restrictions, or participation limitations from previous PT evaluations and new onset of muscle spasms are currently being medically managed by neurology. Per patient, she is being cared for appropriately in group home and all needs are being met. Patient is w/out complaints regarding power wheelchair and is not yet due for updated chair. Due to the above information, no skilled PT intervention is required at this time. Patient and caregiver in agreement with POC.    Recommendations:  1.) Continue with current LE stretching program to reduce impact of spasticity and reduce risk of contractures.   2.) Continue with typical home activities to prevent deconditioning and loss of function.   3.) Continue with frequent positional changes via Luisana and caregiver assistance to reduce risk of pressure ulcers and maintain strength.     Plan:  OP PT Plan  PT Plan: No Additional PT interventions required at this time    Current Problem:   1. CP (cerebral palsy), spastic, quadriplegic (Multi)  Referral to Physical Therapy          Subjective    General:   Patient arrives to clinic mobilizing in power wheelchair w/ caregiver. Patient and caregiver both provide history. Patient is currently at  functional baseline and utilizes power wheelchair for primary mobility and is transferred via Luisana lift at home. Per patient, wheelchair is only 1 year old and is functioning to her liking. Patient has no new complaints outside of muscle spasms for which she is receiving medical intervention for via neurology.     Current Functional Limitations: None    Relevant PMH:  -Spastic quadriplegic CP  -Anemia  -Developmental dysplasia of hip  -Mild intellectual disability  -Schizophrenia    Home Living:   [facility]     Objective   Observation:   -Posture: pt seated w/ good alignment in wheelchair w/ anterior chest strap and seatbelt, pt is reclined w/ head supported on headrest, no outstanding postural abnormalities observed and patient appears comfortable in chair    Functional Movements:   -Transfers: NT (patient Luisana lift for transfers and equipment not available at clinic)  -Sit <> stand: patient W/C bound  -Gait: NT (patient non-ambulatory)    Spasticity:  -Hip flexion: (R) 0, (L) 0  -Hip extension: (R) 2, (L) 2  -Hip IR: (R) 1+, (L) 1+  -Hip ER: (R) 0, (L) 0  -Hip ABD: (R) 0, (L) 0  -Hip ADD: (R) 1, (L) 1  -Knee extensors: (R) 0, (L) 10  -Knee flexors: (R) 1, (L) 1  -Ankle DF: (R) 2, (L) 2  -Ankle PF: (R) 0, (L) 0  -Ankle INV: (R) 0, (L) 0  -Ankle EV: (R) 1+, (L) 1+    Coordination:   -NT d/t patient impairments    EDUCATION:  See assessment above.    Goals:  Goals not set d/t patient at functional baseline

## 2024-12-13 ENCOUNTER — EVALUATION (OUTPATIENT)
Dept: PHYSICAL THERAPY | Facility: CLINIC | Age: 29
End: 2024-12-13
Payer: MEDICARE

## 2024-12-13 ENCOUNTER — APPOINTMENT (OUTPATIENT)
Dept: CARDIOLOGY | Facility: CLINIC | Age: 29
End: 2024-12-13
Payer: MEDICARE

## 2024-12-13 ENCOUNTER — EVALUATION (OUTPATIENT)
Dept: OCCUPATIONAL THERAPY | Facility: CLINIC | Age: 29
End: 2024-12-13
Payer: MEDICARE

## 2024-12-13 DIAGNOSIS — G80.0 CP (CEREBRAL PALSY), SPASTIC, QUADRIPLEGIC (MULTI): ICD-10-CM

## 2024-12-13 PROCEDURE — 97161 PT EVAL LOW COMPLEX 20 MIN: CPT | Mod: GP | Performed by: PHYSICAL THERAPIST

## 2024-12-13 PROCEDURE — 97165 OT EVAL LOW COMPLEX 30 MIN: CPT | Mod: GO

## 2024-12-13 NOTE — PROGRESS NOTES
Occupational Therapy                   Patient Name: Anisha Quevedo  MRN: [id number]  Today's Date: 12/13/2024     Patient is a 29 year old female who presents to the clinic today accompanied by her caregiver/transporter.  Patient presents with limited independence for all levels of care.  This patient presents with a long standing severe challenges and has been functioning at her baseline level in the group home.  Subjective and objective findings reveal that the patient has no new impairments in body structures and functions, activity limitations, and participation restrictions that extend past her baseline functional abilities.  The client has never been functionally independent but is performing at a good level in a group home.  No apparent abuse or neglect is noted.  The affected areas include limited self-care skills, decreased mobility, and limited muscle strength.  No apparent abuse or neglect is noted but he is well cared for in a good setting.  The patient has 0/1 personal factors and co-morbidities that may serve as barriers affecting the plan of care, including age and physical challenges.  The patient's clinical presentation includes stable & uncomplicated characteristics as noted during today's evaluation. Functional task performance have been at baseline from prior status with no disruption of activities of daily living, bathing dressing, selfcare.   Balance and mobility limitations make the client a risk for injury if the wheelchair is not used in the appropriate manner.  No current treatment is necessary at this time for the client.  Time spent evaluating this client and assessing limitations was 30.  The combination of these findings indicate that this patient has 0/1 performance deficits and is of low complexity, and skilled OT services are not warranted at this time.  The patient and caregiver transporter were in agreement with all goals and plan of care.    Recommendations:  1.  Client to engage in  community activities as able.  2.  Continue with home activities to prevent deconditioning.  3.  Patient be encouraged to assist with self-care tasks as able.

## 2024-12-31 ENCOUNTER — APPOINTMENT (OUTPATIENT)
Dept: BEHAVIORAL HEALTH | Facility: CLINIC | Age: 29
End: 2024-12-31
Payer: MEDICARE

## 2024-12-31 DIAGNOSIS — F41.9 ANXIETY: ICD-10-CM

## 2024-12-31 DIAGNOSIS — F70 MILD INTELLECTUAL DISABILITY: ICD-10-CM

## 2024-12-31 PROCEDURE — 90834 PSYTX W PT 45 MINUTES: CPT | Performed by: COUNSELOR

## 2024-12-31 NOTE — PROGRESS NOTES
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)  Visit Type: epic  Reason for visit: managing her worry     - notes [facility] was really good, got to see her sister and niece  - notes she is going to the doc for her migraines and her tremors  - reports she is hallucinating more; when asked if it was shadows or possibly her eyes, she notes she is not sure (notes she is trying the grounding but self reports its not working with the racing heart listed below)  - self reports racing heart; not always when she is hallucinating    - talked about her allegations and the \"why\"      focused on:  - active listening, validation   - problem solving  - coping skills: focusing on music, guided mediation, grounding techniques/distractions, mindfulness, affirmations  - goals for 2025; wants to attend college (put in her ISP)    "

## 2025-01-09 ENCOUNTER — OFFICE VISIT (OUTPATIENT)
Dept: ORTHOPEDIC SURGERY | Facility: CLINIC | Age: 30
End: 2025-01-09
Payer: MEDICARE

## 2025-01-09 DIAGNOSIS — G80.0 CP (CEREBRAL PALSY), SPASTIC, QUADRIPLEGIC (MULTI): Primary | ICD-10-CM

## 2025-01-09 PROCEDURE — 99214 OFFICE O/P EST MOD 30 MIN: CPT | Performed by: ORTHOPAEDIC SURGERY

## 2025-01-09 RX ORDER — BACLOFEN 20 MG/1
20 TABLET ORAL 2 TIMES DAILY
Qty: 60 TABLET | Refills: 11 | Status: SHIPPED | OUTPATIENT
Start: 2025-01-09 | End: 2026-01-09
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
## 2025-01-10 ENCOUNTER — APPOINTMENT (OUTPATIENT)
Dept: BEHAVIORAL HEALTH | Facility: CLINIC | Age: 30
End: 2025-01-10
Payer: MEDICARE

## 2025-01-10 DIAGNOSIS — F41.9 ANXIETY: ICD-10-CM

## 2025-01-10 DIAGNOSIS — G80.0 CP (CEREBRAL PALSY), SPASTIC, QUADRIPLEGIC (MULTI): ICD-10-CM

## 2025-01-10 DIAGNOSIS — F70 MILD INTELLECTUAL DISABILITY: ICD-10-CM

## 2025-01-10 DIAGNOSIS — F20.0 PARANOID SCHIZOPHRENIA (MULTI): Primary | Chronic | ICD-10-CM

## 2025-01-10 PROCEDURE — G2211 COMPLEX E/M VISIT ADD ON: HCPCS | Performed by: PSYCHIATRY & NEUROLOGY

## 2025-01-10 PROCEDURE — 99215 OFFICE O/P EST HI 40 MIN: CPT | Performed by: PSYCHIATRY & NEUROLOGY

## 2025-01-10 ASSESSMENT — ENCOUNTER SYMPTOMS
ABDOMINAL DISTENTION: 0
PALPITATIONS: 0
TREMORS: 0
CHOKING: 0
SHORTNESS OF BREATH: 0
TROUBLE SWALLOWING: 0
SLEEP DISTURBANCE: 0
DIZZINESS: 0
DYSURIA: 0
HALLUCINATIONS: 1
VOMITING: 0
NAUSEA: 0
SEIZURES: 0
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
## 2025-01-12 ASSESSMENT — ENCOUNTER SYMPTOMS: COUGH: 1

## 2025-01-12 NOTE — PATIENT INSTRUCTIONS
Difficult to really tell what might have triggered recurrence of paranoid/delusional thinking a few months back. Equally difficult to tell if this may be a harbinger of impending decompensation.     Today she presents as euthymic, with organized and coherent thought processes. She is ambivalent about making additional medication changes.     Not clear if it would be more beneficial to simply increase her risperidone, or consider starting a low dose antidepressant to help with some of her co-existing trauma symptoms.     For now we are going to continue to closely monitor symptoms and will consider additional treatment changes if/when needed.     Next appointment: Monday 3/17/25 at 4:00 PM virtual.

## 2025-01-21 ENCOUNTER — APPOINTMENT (OUTPATIENT)
Dept: OTOLARYNGOLOGY | Facility: CLINIC | Age: 30
End: 2025-01-21
Payer: COMMERCIAL

## 2025-01-27 ENCOUNTER — HOSPITAL ENCOUNTER (EMERGENCY)
Facility: HOSPITAL | Age: 30
Discharge: HOME | End: 2025-01-27
Attending: EMERGENCY MEDICINE
Payer: MEDICARE

## 2025-01-27 ENCOUNTER — APPOINTMENT (OUTPATIENT)
Dept: CARDIOLOGY | Facility: HOSPITAL | Age: 30
End: 2025-01-27
Payer: MEDICARE

## 2025-01-27 ENCOUNTER — APPOINTMENT (OUTPATIENT)
Dept: RADIOLOGY | Facility: HOSPITAL | Age: 30
End: 2025-01-27
Payer: MEDICARE

## 2025-01-27 VITALS
HEIGHT: 58 IN | SYSTOLIC BLOOD PRESSURE: 133 MMHG | BODY MASS INDEX: 37.36 KG/M2 | OXYGEN SATURATION: 95 % | WEIGHT: 178 LBS | HEART RATE: 90 BPM | TEMPERATURE: 97.9 F | DIASTOLIC BLOOD PRESSURE: 75 MMHG | RESPIRATION RATE: 18 BRPM

## 2025-01-27 DIAGNOSIS — J06.9 VIRAL UPPER RESPIRATORY TRACT INFECTION: Primary | ICD-10-CM

## 2025-01-27 LAB
ALBUMIN SERPL BCP-MCNC: 4.3 G/DL (ref 3.4–5)
ALP SERPL-CCNC: 59 U/L (ref 33–110)
ALT SERPL W P-5'-P-CCNC: 13 U/L (ref 7–45)
ANION GAP SERPL CALC-SCNC: 15 MMOL/L (ref 10–20)
APPEARANCE UR: CLEAR
AST SERPL W P-5'-P-CCNC: 15 U/L (ref 9–39)
B-HCG SERPL-ACNC: <2 MIU/ML
BASOPHILS # BLD AUTO: 0.04 X10*3/UL (ref 0–0.1)
BASOPHILS NFR BLD AUTO: 0.6 %
BILIRUB SERPL-MCNC: 0.4 MG/DL (ref 0–1.2)
BILIRUB UR STRIP.AUTO-MCNC: NEGATIVE MG/DL
BUN SERPL-MCNC: 6 MG/DL (ref 6–23)
CALCIUM SERPL-MCNC: 9.8 MG/DL (ref 8.6–10.3)
CARDIAC TROPONIN I PNL SERPL HS: <3 NG/L (ref 0–13)
CARDIAC TROPONIN I PNL SERPL HS: <3 NG/L (ref 0–13)
CHLORIDE SERPL-SCNC: 104 MMOL/L (ref 98–107)
CO2 SERPL-SCNC: 23 MMOL/L (ref 21–32)
COLOR UR: NORMAL
CREAT SERPL-MCNC: 0.4 MG/DL (ref 0.5–1.05)
EGFRCR SERPLBLD CKD-EPI 2021: >90 ML/MIN/1.73M*2
EOSINOPHIL # BLD AUTO: 0.01 X10*3/UL (ref 0–0.7)
EOSINOPHIL NFR BLD AUTO: 0.1 %
ERYTHROCYTE [DISTWIDTH] IN BLOOD BY AUTOMATED COUNT: 13.2 % (ref 11.5–14.5)
FLUAV RNA RESP QL NAA+PROBE: NOT DETECTED
FLUBV RNA RESP QL NAA+PROBE: NOT DETECTED
GLUCOSE SERPL-MCNC: 94 MG/DL (ref 74–99)
GLUCOSE UR STRIP.AUTO-MCNC: NORMAL MG/DL
HCT VFR BLD AUTO: 43.6 % (ref 36–46)
HGB BLD-MCNC: 13.6 G/DL (ref 12–16)
HOLD SPECIMEN: NORMAL
HOLD SPECIMEN: NORMAL
IMM GRANULOCYTES # BLD AUTO: 0.02 X10*3/UL (ref 0–0.7)
IMM GRANULOCYTES NFR BLD AUTO: 0.3 % (ref 0–0.9)
KETONES UR STRIP.AUTO-MCNC: NEGATIVE MG/DL
LEUKOCYTE ESTERASE UR QL STRIP.AUTO: NEGATIVE
LYMPHOCYTES # BLD AUTO: 1.78 X10*3/UL (ref 1.2–4.8)
LYMPHOCYTES NFR BLD AUTO: 25.3 %
MAGNESIUM SERPL-MCNC: 2.07 MG/DL (ref 1.6–2.4)
MCH RBC QN AUTO: 30 PG (ref 26–34)
MCHC RBC AUTO-ENTMCNC: 31.2 G/DL (ref 32–36)
MCV RBC AUTO: 96 FL (ref 80–100)
MONOCYTES # BLD AUTO: 0.54 X10*3/UL (ref 0.1–1)
MONOCYTES NFR BLD AUTO: 7.7 %
NEUTROPHILS # BLD AUTO: 4.65 X10*3/UL (ref 1.2–7.7)
NEUTROPHILS NFR BLD AUTO: 66 %
NITRITE UR QL STRIP.AUTO: NEGATIVE
NRBC BLD-RTO: 0 /100 WBCS (ref 0–0)
PH UR STRIP.AUTO: 8 [PH]
PLATELET # BLD AUTO: 246 X10*3/UL (ref 150–450)
POTASSIUM SERPL-SCNC: 4 MMOL/L (ref 3.5–5.3)
PROT SERPL-MCNC: 7.8 G/DL (ref 6.4–8.2)
PROT UR STRIP.AUTO-MCNC: NEGATIVE MG/DL
RBC # BLD AUTO: 4.54 X10*6/UL (ref 4–5.2)
RBC # UR STRIP.AUTO: NEGATIVE /UL
RSV RNA RESP QL NAA+PROBE: NOT DETECTED
SARS-COV-2 RNA RESP QL NAA+PROBE: NOT DETECTED
SODIUM SERPL-SCNC: 138 MMOL/L (ref 136–145)
SP GR UR STRIP.AUTO: 1.01
UROBILINOGEN UR STRIP.AUTO-MCNC: NORMAL MG/DL
WBC # BLD AUTO: 7 X10*3/UL (ref 4.4–11.3)

## 2025-01-27 PROCEDURE — 87637 SARSCOV2&INF A&B&RSV AMP PRB: CPT

## 2025-01-27 PROCEDURE — 71045 X-RAY EXAM CHEST 1 VIEW: CPT | Mod: FOREIGN READ | Performed by: SPECIALIST

## 2025-01-27 PROCEDURE — 36415 COLL VENOUS BLD VENIPUNCTURE: CPT

## 2025-01-27 PROCEDURE — 84075 ASSAY ALKALINE PHOSPHATASE: CPT

## 2025-01-27 PROCEDURE — 83735 ASSAY OF MAGNESIUM: CPT

## 2025-01-27 PROCEDURE — 81003 URINALYSIS AUTO W/O SCOPE: CPT

## 2025-01-27 PROCEDURE — 93005 ELECTROCARDIOGRAM TRACING: CPT

## 2025-01-27 PROCEDURE — 71045 X-RAY EXAM CHEST 1 VIEW: CPT

## 2025-01-27 PROCEDURE — 2500000001 HC RX 250 WO HCPCS SELF ADMINISTERED DRUGS (ALT 637 FOR MEDICARE OP)

## 2025-01-27 PROCEDURE — 84484 ASSAY OF TROPONIN QUANT: CPT

## 2025-01-27 PROCEDURE — 99285 EMERGENCY DEPT VISIT HI MDM: CPT | Mod: 25 | Performed by: EMERGENCY MEDICINE

## 2025-01-27 PROCEDURE — 84702 CHORIONIC GONADOTROPIN TEST: CPT

## 2025-01-27 PROCEDURE — 36415 COLL VENOUS BLD VENIPUNCTURE: CPT | Performed by: EMERGENCY MEDICINE

## 2025-01-27 PROCEDURE — 85025 COMPLETE CBC W/AUTO DIFF WBC: CPT

## 2025-01-27 RX ORDER — ACETAMINOPHEN 325 MG/1
650 TABLET ORAL ONCE
Status: COMPLETED | OUTPATIENT
Start: 2025-01-27 | End: 2025-01-27

## 2025-01-27 RX ADMIN — ACETAMINOPHEN 650 MG: 325 TABLET ORAL at 15:03

## 2025-01-27 ASSESSMENT — PAIN - FUNCTIONAL ASSESSMENT: PAIN_FUNCTIONAL_ASSESSMENT: 0-10

## 2025-01-27 ASSESSMENT — LIFESTYLE VARIABLES
HAVE PEOPLE ANNOYED YOU BY CRITICIZING YOUR DRINKING: NO
HAVE YOU EVER FELT YOU SHOULD CUT DOWN ON YOUR DRINKING: NO
TOTAL SCORE: 0
EVER FELT BAD OR GUILTY ABOUT YOUR DRINKING: NO
EVER HAD A DRINK FIRST THING IN THE MORNING TO STEADY YOUR NERVES TO GET RID OF A HANGOVER: NO

## 2025-01-27 ASSESSMENT — PAIN SCALES - GENERAL
PAINLEVEL_OUTOF10: 0 - NO PAIN
PAINLEVEL_OUTOF10: 2

## 2025-01-27 NOTE — DISCHARGE INSTRUCTIONS
You were assessed in the ED for your cough and lethargy.  Lab work was normal, no dehydration present,, EKG was normal, no evidence of a acute heart attack.  No UTI was present.  Chest x-ray was normal, no pneumonia.    Please follow-up with your primary care provider in 1 week. Please take Tylenol or Motrin for your discomfort.     Please return to the ED if you have a fever greater than 101 °F for more than 24 hours that cannot be lowered with Tylenol or Motrin, if there is blood in the vomit, if you have more than 5 episodes of diarrhea, the diarrhea is bloody, if you cannot breathe, and if you have any other concerns.
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
## 2025-01-28 LAB — HOLD SPECIMEN: NORMAL

## 2025-01-29 ENCOUNTER — OFFICE VISIT (OUTPATIENT)
Dept: GASTROENTEROLOGY | Facility: CLINIC | Age: 30
End: 2025-01-29
Payer: COMMERCIAL

## 2025-01-29 VITALS
SYSTOLIC BLOOD PRESSURE: 144 MMHG | HEART RATE: 109 BPM | WEIGHT: 182 LBS | BODY MASS INDEX: 38.04 KG/M2 | DIASTOLIC BLOOD PRESSURE: 90 MMHG

## 2025-01-29 DIAGNOSIS — K21.9 GASTROESOPHAGEAL REFLUX DISEASE WITHOUT ESOPHAGITIS: Primary | ICD-10-CM

## 2025-01-29 PROCEDURE — 99214 OFFICE O/P EST MOD 30 MIN: CPT | Performed by: NURSE PRACTITIONER

## 2025-01-29 RX ORDER — OMEPRAZOLE 20 MG/1
20 TABLET, DELAYED RELEASE ORAL DAILY
Qty: 30 TABLET | Refills: 11 | Status: SHIPPED | OUTPATIENT
Start: 2025-01-29 | End: 2025-01-30 | Stop reason: SDUPTHER

## 2025-01-29 RX ORDER — OMEPRAZOLE 40 MG/1
40 CAPSULE, DELAYED RELEASE ORAL DAILY
Qty: 30 CAPSULE | Refills: 11 | Status: SHIPPED | OUTPATIENT
Start: 2025-01-29 | End: 2025-01-30 | Stop reason: ALTCHOICE

## 2025-01-29 ASSESSMENT — ENCOUNTER SYMPTOMS
CHEST TIGHTNESS: 0
PSYCHIATRIC NEGATIVE: 1
WHEEZING: 0
RESPIRATORY NEGATIVE: 1
FEVER: 0
CHILLS: 0
ENDOCRINE NEGATIVE: 1
STRIDOR: 0
SHORTNESS OF BREATH: 0
EYES NEGATIVE: 1
COUGH: 0
DIFFICULTY URINATING: 0
NEUROLOGICAL NEGATIVE: 1
APNEA: 0
HEMATOLOGIC/LYMPHATIC NEGATIVE: 1
ALLERGIC/IMMUNOLOGIC NEGATIVE: 1
FATIGUE: 0
DIAPHORESIS: 0
DEPRESSION: 0
MUSCULOSKELETAL NEGATIVE: 1
CARDIOVASCULAR NEGATIVE: 1
ROS GI COMMENTS: SEE HPI

## 2025-01-29 NOTE — PATIENT INSTRUCTIONS
GERD - Avoid overeating at mealtime.  Stay upright for 2 hours after eating.  Do not eat for 3-4 hours before going to bed.  Elevated the head of the bed 6 inches when you are sleeping. Avoid excessive amounts of fried foods, acidic foods, spicy foods, alcohol, caffeine, peppermint, spearmint, chocolate, non-steroidal anti- inflammatory medications, such as, Ibuprofen, Advil, Motrin, Aleve, Naproxen, Naprosyn, Moloxicam, Etodolac, and Voltaren.    Start Omeprazole 40 mg in the morning about 30-60 minutes before breakfast    Start Omeprazole 20 mg before dinner

## 2025-01-29 NOTE — PROGRESS NOTES
Subjective   Patient ID: Anisha Quevedo is a 29 y.o. female who presents for Follow-up. PMH: asthma, GERD, CP     Presents today for follow-up  She has intermittent episodes of coughing up mucous followed by swallowing mucous and sometimes vomiting the mucous however this has not been recently.    She did have a prior EGD and swallow test. The swallow test showed possibly regurgitation of barium in the upper esophagus and EGD found gastritis  They deny she is having difficulty eating, swallowing, coughing or choking on foods.   She was recently evaluated with PT and OT, she is on a soft food diet/regular food diet       She was vomiting in the past but this has now resolved.   She is using Prilosec   Recently admitted for SOB and referred to ENT      She is using Miralax for constipation  Prilosec for reflux    She denies reflux, heartburn, abdominal pain, dysphagia, odynophagia.     Review of Systems   Constitutional:  Negative for chills, diaphoresis, fatigue and fever.   HENT: Negative.     Eyes: Negative.    Respiratory: Negative.  Negative for apnea, cough, chest tightness, shortness of breath, wheezing and stridor.    Cardiovascular: Negative.    Gastrointestinal:         See HPI    Endocrine: Negative.    Genitourinary: Negative.  Negative for difficulty urinating.   Musculoskeletal: Negative.    Skin: Negative.    Allergic/Immunologic: Negative.    Neurological: Negative.    Hematological: Negative.    Psychiatric/Behavioral: Negative.         Objective   Physical Exam  Neurological:      Mental Status: She is alert.            This is a 29 year old female with a PMH of asthma, GERD, cerebral palsy and wheel chair bound. Last seen in GI in 6/2024 for follow-up after EGD and hospitalization. While inpatient she did have an EGD for ?dysphagia based off an abnormal barium swallow showing possibly regurgitation of barium in the upper esophagus. She presents today with her caregiver who reports she is doing well  however will have intermittent episodes of coughing up mucous followed by vomiting (mucous), however recently this resolved. We discussed this could be 2/2 gastritis, GERD, pulmonary issue, PND and they report recently pulmonary/ENT evaluation which was normal. We can increase the PPI and I do not think a repeat EGD is necessary at this time since she is currently without symptoms and denies symptoms of dysphagia.   They will f/up in 1 year or sooner as needed.         JESSICA Cristobal-CNP 01/29/25 10:24 AM

## 2025-01-30 ENCOUNTER — TELEPHONE (OUTPATIENT)
Dept: ORTHOPEDIC SURGERY | Facility: HOSPITAL | Age: 30
End: 2025-01-30
Payer: MEDICARE

## 2025-01-30 DIAGNOSIS — K21.9 GASTROESOPHAGEAL REFLUX DISEASE WITHOUT ESOPHAGITIS: ICD-10-CM

## 2025-01-30 RX ORDER — OMEPRAZOLE 20 MG/1
TABLET, DELAYED RELEASE ORAL
Qty: 90 TABLET | Refills: 11 | Status: SHIPPED | OUTPATIENT
Start: 2025-01-30 | End: 2026-01-30

## 2025-01-30 NOTE — TELEPHONE ENCOUNTER
SYMPTOM PHONE CALL    Name of Patient: Anisha Quevedo  Parent or Guardian's Name: [facility]       Reason for Call: Baclofen     Additional Information: Curious how long side effects of the Baclofen will wear off. She is still under heavy sedation. Patient stopped meds 2 days ago.     Call Back Number: [phone]   Previous Visit: Date 1/9/25 With [place]   Date of Next Visit: Date 7/10/25  With [place]

## 2025-01-31 DIAGNOSIS — F20.0 PARANOID SCHIZOPHRENIA (MULTI): Primary | Chronic | ICD-10-CM

## 2025-01-31 RX ORDER — RISPERIDONE 4 MG/1
4 TABLET ORAL NIGHTLY
Qty: 30 TABLET | Refills: 2 | Status: SHIPPED | OUTPATIENT
Start: 2025-01-31

## 2025-01-31 NOTE — PROGRESS NOTES
"WH updates:  We are concern with Anisha, She seems very anxious and scared.  She is afraid to leave the house and requested to go to hospital.  We did send her yesterday to rule out infection. Lab work was normal, no dehydration present, EKG was normal, no evidence of a acute heart attack. No UTI was present. Chest x-ray was normal, no pneumonia. Staff report \"she is out of it\" she just stares out or keeps her eyes closed. She reports sees a figure stalking her outside her bed room and through her window.  She is also refusing to eat. The only recent medication change was oral Baclofen on the 10th, this was in addition to the baclofen pump, but that was dc'd yesterday afternoon for concerns that maybe it could be a contributing factor.  But we have not seen any improvements since.    Increase risperidone to 0.5mg/0.5mg/4mg for psychosis.    "

## 2025-02-01 LAB
ATRIAL RATE: 83 BPM
ATRIAL RATE: 85 BPM
P AXIS: 50 DEGREES
P AXIS: 53 DEGREES
P OFFSET: 189 MS
P OFFSET: 191 MS
P ONSET: 142 MS
P ONSET: 146 MS
PR INTERVAL: 148 MS
PR INTERVAL: 156 MS
Q ONSET: 220 MS
Q ONSET: 220 MS
QRS COUNT: 14 BEATS
QRS COUNT: 14 BEATS
QRS DURATION: 76 MS
QRS DURATION: 78 MS
QT INTERVAL: 332 MS
QT INTERVAL: 352 MS
QTC CALCULATION(BAZETT): 395 MS
QTC CALCULATION(BAZETT): 413 MS
QTC FREDERICIA: 372 MS
QTC FREDERICIA: 392 MS
R AXIS: 20 DEGREES
R AXIS: 29 DEGREES
T AXIS: 32 DEGREES
T AXIS: 41 DEGREES
T OFFSET: 386 MS
T OFFSET: 396 MS
VENTRICULAR RATE: 83 BPM
VENTRICULAR RATE: 85 BPM

## 2025-02-03 ENCOUNTER — APPOINTMENT (OUTPATIENT)
Dept: BEHAVIORAL HEALTH | Facility: CLINIC | Age: 30
End: 2025-02-03
Payer: MEDICARE

## 2025-02-04 ENCOUNTER — HOSPITAL ENCOUNTER (INPATIENT)
Facility: HOSPITAL | Age: 30
End: 2025-02-04
Attending: STUDENT IN AN ORGANIZED HEALTH CARE EDUCATION/TRAINING PROGRAM | Admitting: STUDENT IN AN ORGANIZED HEALTH CARE EDUCATION/TRAINING PROGRAM
Payer: MEDICARE

## 2025-02-10 ENCOUNTER — TELEPHONE (OUTPATIENT)
Dept: OTHER | Age: 30
End: 2025-02-10
Payer: MEDICARE

## 2025-02-10 DIAGNOSIS — F20.0 PARANOID SCHIZOPHRENIA (MULTI): Primary | Chronic | ICD-10-CM

## 2025-02-10 PROBLEM — N39.0 ACUTE UTI (URINARY TRACT INFECTION): Status: RESOLVED | Noted: 2025-02-01 | Resolved: 2025-02-10

## 2025-02-10 PROBLEM — R65.20 SEVERE SEPSIS: Status: RESOLVED | Noted: 2025-02-01 | Resolved: 2025-02-10

## 2025-02-10 PROBLEM — A41.9 SEVERE SEPSIS: Status: RESOLVED | Noted: 2025-02-01 | Resolved: 2025-02-10

## 2025-02-10 PROBLEM — G93.41 ACUTE METABOLIC ENCEPHALOPATHY: Status: RESOLVED | Noted: 2025-02-03 | Resolved: 2025-02-10

## 2025-02-10 RX ORDER — RISPERIDONE 1 MG/1
TABLET ORAL
Qty: 60 TABLET | Refills: 2 | Status: SHIPPED | OUTPATIENT
Start: 2025-02-10

## 2025-02-10 RX ORDER — LORAZEPAM 1 MG/1
TABLET ORAL
Qty: 60 TABLET | Refills: 0 | Status: SHIPPED | OUTPATIENT
Start: 2025-02-10
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
## 2025-02-11 ENCOUNTER — PATIENT OUTREACH (OUTPATIENT)
Dept: PRIMARY CARE | Facility: CLINIC | Age: 30
End: 2025-02-11
Payer: MEDICARE

## 2025-02-11 NOTE — PROGRESS NOTES
Discharge Facility:[facility]   Discharge Diagnosis:  Severe sepsis   Acute UTI     Admission Date:1/31/2025  Discharge Date: 2/7/2025    PCP Appointment Date:2/14/2025  Specialist Appointment Date: 3/4/2025 Behavioral Health/[facility] Encounter and Summary Linked: Yes  See discharge assessment below for further details  Wrap Up  Wrap Up Additional Comments: -- (Spoke with Caregiver Sheila. She states that Anisha is still having episoded of psychosis. She does have fu appt with behavior health as well as PCP.) (2/11/2025  1:48 PM)    Engagement  Call Start Time: 1346 (2/11/2025  1:48 PM)    Medications  Medications reviewed with patient/caregiver?: Not applicable (2/11/2025  1:48 PM)  Is the patient having any side effects they believe may be caused by any medication additions or changes?: No (2/11/2025  1:48 PM)  Does the patient have all medications ordered at discharge?: Not applicable (2/11/2025  1:48 PM)  Care Management Interventions: No intervention needed (2/11/2025  1:48 PM)  Prescription Comments: -- (N/A) (2/11/2025  1:48 PM)  Is the patient taking all medications as directed (includes completed medication regime)?: Yes (2/11/2025  1:48 PM)  Medication Comments: -- (N/A) (2/11/2025  1:48 PM)    Appointments  Does the patient have a primary care provider?: Yes (2/11/2025  1:48 PM)  Care Management Interventions: Verified appointment date/time/provider (2/14/2025) (2/11/2025  1:48 PM)  Has the patient kept scheduled appointments due by today?: Yes (2/11/2025  1:48 PM)    Self Management  What is the home health agency?: -- (Lives at [facility]) (2/11/2025  1:48 PM)  What Durable Medical Equipment (DME) was ordered?: -- (N/A) (2/11/2025  1:48 PM)    Patient Teaching  Does the patient have access to their discharge instructions?: Yes (2/11/2025  1:48 PM)  What is the patient's perception of their health status since discharge?: -- (Caregiver Sheila states still having breakthru  psychosis) (2/11/2025  1:48 PM)  Is the patient/caregiver able to teach back the hierarchy of who to call/visit for symptoms/problems? PCP, Specialist, Home Health nurse, Urgent Care, ED, 911: Yes (2/11/2025  1:48 PM)

## 2025-02-14 ENCOUNTER — APPOINTMENT (OUTPATIENT)
Dept: PRIMARY CARE | Facility: CLINIC | Age: 30
End: 2025-02-14
Payer: MEDICARE

## 2025-02-19 ENCOUNTER — PATIENT OUTREACH (OUTPATIENT)
Dept: PRIMARY CARE | Facility: CLINIC | Age: 30
End: 2025-02-19
Payer: MEDICARE

## 2025-02-25 ENCOUNTER — APPOINTMENT (OUTPATIENT)
Dept: PRIMARY CARE | Facility: CLINIC | Age: 30
End: 2025-02-25
Payer: MEDICARE

## 2025-02-25 DIAGNOSIS — R20.2 NUMBNESS AND TINGLING: Primary | ICD-10-CM

## 2025-02-25 DIAGNOSIS — R13.10 DYSPHAGIA, UNSPECIFIED TYPE: ICD-10-CM

## 2025-02-25 DIAGNOSIS — E78.2 MIXED HYPERLIPIDEMIA: ICD-10-CM

## 2025-02-25 DIAGNOSIS — M94.0 COSTOCHONDRITIS: ICD-10-CM

## 2025-02-25 DIAGNOSIS — E66.01 SEVERE OBESITY (BMI 35.0-35.9 WITH COMORBIDITY) (MULTI): ICD-10-CM

## 2025-02-25 DIAGNOSIS — R20.0 NUMBNESS AND TINGLING: Primary | ICD-10-CM

## 2025-02-25 PROCEDURE — G2211 COMPLEX E/M VISIT ADD ON: HCPCS | Performed by: INTERNAL MEDICINE

## 2025-02-25 PROCEDURE — 99214 OFFICE O/P EST MOD 30 MIN: CPT | Performed by: INTERNAL MEDICINE

## 2025-02-25 PROCEDURE — 1036F TOBACCO NON-USER: CPT | Performed by: INTERNAL MEDICINE

## 2025-02-25 RX ORDER — LIDOCAINE 50 MG/G
1 PATCH TOPICAL DAILY PRN
Qty: 30 PATCH | Refills: 0 | Status: SHIPPED | OUTPATIENT
Start: 2025-02-25 | End: 2025-02-26 | Stop reason: ALTCHOICE
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
## 2025-02-26 ENCOUNTER — PATIENT MESSAGE (OUTPATIENT)
Dept: PRIMARY CARE | Facility: CLINIC | Age: 30
End: 2025-02-26
Payer: MEDICARE

## 2025-02-26 ENCOUNTER — TELEPHONE (OUTPATIENT)
Dept: PRIMARY CARE | Facility: CLINIC | Age: 30
End: 2025-02-26
Payer: MEDICARE

## 2025-02-26 DIAGNOSIS — M94.0 COSTOCHONDRITIS: Primary | ICD-10-CM

## 2025-02-26 NOTE — TELEPHONE ENCOUNTER
----- Message from Shelia Garcia sent at 2/25/2025  7:01 PM EST -----  Can we set up apt in 1 month with me--in person

## 2025-03-04 ENCOUNTER — APPOINTMENT (OUTPATIENT)
Dept: BEHAVIORAL HEALTH | Facility: CLINIC | Age: 30
End: 2025-03-04
Payer: MEDICARE

## 2025-03-04 DIAGNOSIS — F70 MILD INTELLECTUAL DISABILITY: ICD-10-CM

## 2025-03-04 DIAGNOSIS — F41.9 ANXIETY: ICD-10-CM

## 2025-03-04 PROCEDURE — 90834 PSYTX W PT 45 MINUTES: CPT | Performed by: COUNSELOR
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
## 2025-03-16 ASSESSMENT — ENCOUNTER SYMPTOMS
COUGH: 1
CHOKING: 0
ABDOMINAL DISTENTION: 0
NAUSEA: 0
TREMORS: 0
SHORTNESS OF BREATH: 0
DYSURIA: 0
SEIZURES: 0
HALLUCINATIONS: 1
SLEEP DISTURBANCE: 0
PALPITATIONS: 0
VOMITING: 0
DIZZINESS: 0
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
## 2025-03-17 ENCOUNTER — APPOINTMENT (OUTPATIENT)
Dept: BEHAVIORAL HEALTH | Facility: CLINIC | Age: 30
End: 2025-03-17
Payer: MEDICARE

## 2025-03-17 DIAGNOSIS — G80.0 CP (CEREBRAL PALSY), SPASTIC, QUADRIPLEGIC (MULTI): ICD-10-CM

## 2025-03-17 DIAGNOSIS — F70 MILD INTELLECTUAL DISABILITY: ICD-10-CM

## 2025-03-17 DIAGNOSIS — F20.0 PARANOID SCHIZOPHRENIA (MULTI): Primary | Chronic | ICD-10-CM

## 2025-03-17 DIAGNOSIS — F41.9 ANXIETY: ICD-10-CM

## 2025-03-17 PROCEDURE — 99215 OFFICE O/P EST HI 40 MIN: CPT | Performed by: PSYCHIATRY & NEUROLOGY

## 2025-03-17 PROCEDURE — G2212 PROLONG OUTPT/OFFICE VIS: HCPCS | Performed by: PSYCHIATRY & NEUROLOGY

## 2025-03-17 PROCEDURE — G2211 COMPLEX E/M VISIT ADD ON: HCPCS | Performed by: PSYCHIATRY & NEUROLOGY

## 2025-03-18 RX ORDER — RISPERIDONE 4 MG/1
4 TABLET ORAL NIGHTLY
Qty: 30 TABLET | Refills: 5 | Status: SHIPPED | OUTPATIENT
Start: 2025-03-18

## 2025-03-18 RX ORDER — RISPERIDONE 1 MG/1
TABLET ORAL
Qty: 60 TABLET | Refills: 5 | Status: SHIPPED | OUTPATIENT
Start: 2025-03-18

## 2025-03-18 ASSESSMENT — ENCOUNTER SYMPTOMS: TROUBLE SWALLOWING: 1

## 2025-03-19 ENCOUNTER — PATIENT OUTREACH (OUTPATIENT)
Dept: PRIMARY CARE | Facility: CLINIC | Age: 30
End: 2025-03-19
Payer: MEDICARE

## 2025-03-19 LAB
CHOLEST SERPL-MCNC: 188 MG/DL
CHOLEST/HDLC SERPL: 2.8 (CALC)
HDLC SERPL-MCNC: 66 MG/DL
LDLC SERPL CALC-MCNC: 103 MG/DL (CALC)
NONHDLC SERPL-MCNC: 122 MG/DL (CALC)
TRIGL SERPL-MCNC: 99 MG/DL
TSH SERPL-ACNC: 0.97 MIU/L
VIT B12 SERPL-MCNC: 570 PG/ML (ref 200–1100)

## 2025-03-19 NOTE — PROGRESS NOTES
Call after hospitalization.  At time of outreach call the patient feels as if their condition has improved since last visit.  Reviewed the PCP appointment with the pt and addressed any questions or concerns.  Spoke to caregiver Sheila. She states Anisha is doing much better now, back to self.

## 2025-03-26 ENCOUNTER — APPOINTMENT (OUTPATIENT)
Dept: OPHTHALMOLOGY | Facility: CLINIC | Age: 30
End: 2025-03-26
Payer: MEDICARE

## 2025-03-27 ENCOUNTER — OFFICE VISIT (OUTPATIENT)
Dept: OTOLARYNGOLOGY | Facility: CLINIC | Age: 30
End: 2025-03-27
Payer: MEDICARE

## 2025-03-27 VITALS — BODY MASS INDEX: 36.11 KG/M2 | WEIGHT: 172 LBS | TEMPERATURE: 98.7 F | HEIGHT: 58 IN

## 2025-03-27 DIAGNOSIS — J31.0 CHRONIC RHINITIS: ICD-10-CM

## 2025-03-27 DIAGNOSIS — H90.3 BILATERAL SENSORINEURAL HEARING LOSS: Primary | ICD-10-CM

## 2025-03-27 DIAGNOSIS — J34.89 NASAL MUCOSA DRY: ICD-10-CM

## 2025-03-27 PROCEDURE — 3008F BODY MASS INDEX DOCD: CPT | Performed by: STUDENT IN AN ORGANIZED HEALTH CARE EDUCATION/TRAINING PROGRAM

## 2025-03-27 PROCEDURE — 31231 NASAL ENDOSCOPY DX: CPT | Performed by: STUDENT IN AN ORGANIZED HEALTH CARE EDUCATION/TRAINING PROGRAM

## 2025-03-27 PROCEDURE — 99214 OFFICE O/P EST MOD 30 MIN: CPT | Performed by: STUDENT IN AN ORGANIZED HEALTH CARE EDUCATION/TRAINING PROGRAM

## 2025-03-27 PROCEDURE — 99214 OFFICE O/P EST MOD 30 MIN: CPT | Mod: 25 | Performed by: STUDENT IN AN ORGANIZED HEALTH CARE EDUCATION/TRAINING PROGRAM

## 2025-03-27 RX ORDER — AZELASTINE 1 MG/ML
2 SPRAY, METERED NASAL DAILY
Qty: 30 ML | Refills: 11 | Status: SHIPPED | OUTPATIENT
Start: 2025-03-27 | End: 2026-03-27

## 2025-03-27 RX ORDER — ACETAMINOPHEN 500 MG
TABLET ORAL DAILY
COMMUNITY

## 2025-03-27 ASSESSMENT — ENCOUNTER SYMPTOMS
OCCASIONAL FEELINGS OF UNSTEADINESS: 1
DEPRESSION: 0
LOSS OF SENSATION IN FEET: 1

## 2025-03-27 ASSESSMENT — PAIN SCALES - GENERAL: PAINLEVEL_OUTOF10: 0-NO PAIN

## 2025-03-27 NOTE — PROGRESS NOTES
Subjective   Patient ID: Anisha Quevedo is a 29 y.o. female who presents for Follow-up.    History: 27-year-old female self-referred for evaluation of hearing concerns. She presents today with her caretaker; she lives in a group home due to a history of cerebral palsy.     She wears hearing aids and she is concerned that her hearing may be worsening. She feels that her previously appreciated tinnitus is somewhat improved with hearing aids. She denies otalgia and otorrhea but thinks there might be some cerumen today. Has noted some dizziness for the past few days. She denies a history of ear surgery.     She is noting some burning in the nose. She has a history of seasonal allergies. She uses saline spray as needed. She has been using Flonase as needed. Has not noticed worse nasal obstruction nor drainage. She has a history of asthma.     Update 10/31/2022:  Follow-up for several concerns:  1. Returns today with an audiogram. Notes persistently decreased hearing.  2. Notes persistent nasal burning. Notes bilateral sensation that bothers her daily. Has not improved with switching to Nasacort at our last visit. She denies epistaxis.     Update 2/8/2023:  Follow-up for several concerns:  1. Patient presents today with a caretaker. Caretaker notes that she would like a referral for repeat hearing testing.  2. She reports that the nasal irritation and burning has somewhat improved. She is continue to use mupirocin ointment daily. She is continuing to use Nasacort daily. Has had 1 episode of epistaxis since her last visit; this is fairly brief.     Update 10/11/2023: Here today with transport  Here today with repeat audiogram.  She was concerned about possible repeat impaction.  Otherwise no otalgia.  She is still noting some nasal irritation and burning.  Has been using Nasacort daily.  In addition, she is noting some headaches that are bothering her daily.    Update 3/27/2025:  Follow-up for adenoid hypertrophy incidentally  noted on imaging during inpatient stay for pneumonia. She is noting some facial numbness; she reports that symptoms involve the nose, periorbital tissue, and forehead.  She continues to have intermittent nasal burning sensation.  Has been using fluticasone nasal spray regularly; it appears this is changed at some point from previously prescribed Nasacort/budesonide.  She is continue to use mupirocin ointment daily.    Objective   Physical Exam  CONSTITUTIONAL: Well appearing female who appears stated age. Seen in wheelchair.  VOICE: Clear speech without hoarseness. Mild dysarthria. No stridor nor stertor.  HEAD, FACE, AND SKIN: Symmetric facial features.  EARS: External ears were normally formed with no lesions. The external auditory canals were clear. The tympanic membranes were intact and in the neutral position. No significant retraction pockets, effusions were appreciated.  NOSE: Nasal dorsum was midline. Anterior rhinoscopy demonstrated a septum relatively midline. Some crusting along the nasal shirin bilaterally. No irritation appreciated the nasal cells.  There is stable bilateral mild inferior turbinate hypertrophy. No drainage today. No obvious nasal masses, polyps, mucopurulence, nor other lesions were appreciated.  OROPHARYNX: No lesion nor mucosal abnormality. The uvula was normal appearing. No drainage in the oropharynx. Tonsils 1+.  Moderate pharyngeal cobblestoning appreciated today.  RESPIRATORY: Normal inspiration and expiration and chest wall expansion; no use of accessory muscles to breathe.  PSYCHIATRIC: Alert, appropriate mood and affect.   NEURO: Sensate V1-V3 bilaterally. CN VII intact and symmetric.    --------------------------------------------------  Audiology:  12/5/2024.  I personally reviewed the patient's recent audiogram.  This demonstrated a stable mild sloping to moderate sensorineural hearing loss bilaterally.  She continues to have excellent word recognition scores with  amplification bilaterally.  She has type a tympanograms.    --------------------------------------------------    --------------------------------------------------  Procedure: Nasal endoscopy - Diagnostic  Indication: Chronic rhinitis, adenoid hypertrophy  Informed consent verbally obtained: The risks, benefits, alternatives, and expectations were discussed with the patient, who wished to proceed  Anesthesia: Topical lidocaine/oxymetazoline    Findings: After topical anesthetic was applied the nasal cavities were examined with a flexible endoscope. The septum was relatively midline.  - Right: The inferior turbinate was moderately to severely hypertrophied. The middle turbinate appeared healthy; the middle meatus and spheno-ethmoid recess were free of purulence, masses, lesions, or polyps. The nasal passageway is patent.  - Left: The inferior turbinate was moderately to severely hypertrophied. The middle turbinate appeared healthy; the middle meatus and spheno-ethmoid recess were free of purulence, masses, lesions, or polyps. The nasal passageway is patent.    The nasopharynx was relatively clear.  Moderately sized adenoid pad with prominent superior component but not particularly obstructive of the nasopharynx.  No obstruction of the eustachian tube orifices.    There were no complications and the patient tolerated the procedure well.  --------------------------------------------------    Assessment/Plan   Diagnoses and all orders for this visit:  Bilateral sensorineural hearing loss  Chronic rhinitis  -     azelastine (Astelin) 137 mcg (0.1 %) nasal spray; Administer 2 sprays into each nostril once daily. Use in each nostril as directed  Nasal mucosa dry      29 y.o. female 27-year-old female with symptoms of hearing loss as well as clinical findings consistent with chronic rhinitis.     1. Bilateral sensorineural hearing loss, history of cerumen impaction  The patient has a history of bilateral sensorineural  hearing loss.  On audiogram she has stable mild sloping to moderate sensorineural hearing loss bilaterally.  She should continue to get this checked on a yearly basis (next December 2026).     2. Chronic Rhinitis, nasal burning, ITH  The patient has also been reporting nasal burning since she began living at her current assisted living facility. She was initially using fluticasone nasal spray as well as saline spray as needed. On anterior rhinoscopy the patient has moderate inferior turbinate hypertrophy as well as clear nasal drainage. She reports a history of environmental allergies.    We initially changed her from fluticasone to triamcinolone nasal sprays but she did not appreciate significant change in symptoms.  On a prior exam there was some mild irritation along the right nasal sill.  She has appreciated improvement with mupirocin ointment used on a daily basis.  However, today she is noting persistent nasal burning and general discomfort of the nasal cavities and face.  I recommended she trial azelastine nasal spray in addition to her fluticasone nasal spray.    Follow-up in 4 months.    This note was created using speech recognition transcription software. Despite proofreading, typographical errors may be present that affect the meaning of the content. Please contact my office with any questions.

## 2025-03-27 NOTE — PATIENT INSTRUCTIONS
Please continue to have your hearing checked on a yearly basis.  Your next scheduled to have your hearing checked in December 2025.    You note that you continue to get some nasal burning symptoms occasionally.  I would recommend that you continue use of the mupirocin ointment as well as your nasal steroid spray.  I would like to try adding in a second nasal spray called azelastine to help with this symptom.  Please use this in combination with the fluticasone nasal spray.  You can use 1 in the morning and 1 in the evening for use both at the same time.    Today we performed nasal endoscopy to evaluate your nasal cavities and to see the adenoid pad.  You had swelling throughout your nasal cavities as well as a mildly prominent adenoid pad.  I did not see anything that merited significant concern.    Please see me in 4 months.

## 2025-04-01 ENCOUNTER — APPOINTMENT (OUTPATIENT)
Dept: PRIMARY CARE | Facility: CLINIC | Age: 30
End: 2025-04-01
Payer: MEDICARE

## 2025-04-01 VITALS
WEIGHT: 171 LBS | HEIGHT: 58 IN | BODY MASS INDEX: 35.89 KG/M2 | HEART RATE: 78 BPM | SYSTOLIC BLOOD PRESSURE: 102 MMHG | DIASTOLIC BLOOD PRESSURE: 78 MMHG | OXYGEN SATURATION: 97 %

## 2025-04-01 DIAGNOSIS — R20.0 NUMBNESS AND TINGLING: ICD-10-CM

## 2025-04-01 DIAGNOSIS — E66.01 SEVERE OBESITY (BMI 35.0-35.9 WITH COMORBIDITY) (MULTI): Primary | ICD-10-CM

## 2025-04-01 DIAGNOSIS — F20.0 PARANOID SCHIZOPHRENIA (MULTI): Chronic | ICD-10-CM

## 2025-04-01 DIAGNOSIS — G82.50 SPASTIC QUADRIPLEGIA (MULTI): ICD-10-CM

## 2025-04-01 DIAGNOSIS — R20.2 NUMBNESS AND TINGLING: ICD-10-CM

## 2025-04-01 PROBLEM — R79.89 ELEVATED VITAMIN B12 LEVEL: Status: RESOLVED | Noted: 2023-05-08 | Resolved: 2025-04-01

## 2025-04-01 PROBLEM — R79.89 ABNORMAL TSH: Status: RESOLVED | Noted: 2023-05-08 | Resolved: 2025-04-01

## 2025-04-01 PROCEDURE — G2211 COMPLEX E/M VISIT ADD ON: HCPCS | Performed by: INTERNAL MEDICINE

## 2025-04-01 PROCEDURE — 3008F BODY MASS INDEX DOCD: CPT | Performed by: INTERNAL MEDICINE

## 2025-04-01 PROCEDURE — 1036F TOBACCO NON-USER: CPT | Performed by: INTERNAL MEDICINE

## 2025-04-01 PROCEDURE — 99213 OFFICE O/P EST LOW 20 MIN: CPT | Performed by: INTERNAL MEDICINE

## 2025-04-01 NOTE — PATIENT INSTRUCTIONS
Anisha states has a cough this moirning. Oxygen and lungs are clear. When breathing through nose, no wheezing  I would recommend a breathing treatment   It is likely the cold weather  If worsening let us know     She states numbness is better. We will watch and wait and if worsening we can do a EMG

## 2025-04-01 NOTE — PROGRESS NOTES
Subjective   Patient ID: Anisha Quevedo is a 29 y.o. female who presents for Follow-up.    HPI     Anisha is here today  No concerns from house    Psychiatry notes reviewed. Anisha's states 2 friends are in attic and asking for help. Psych aware of these hallucinations. She does not talk about anyone hurting her. Psychiatry aware of this and these  are deemed as hallucinations     Has worsening cough this AM. Cold makes it worse    She states numbness better    She is nervous for her swallow test  Denies chest pain today    Review of Systems   All other systems reviewed and are negative.      Objective   There were no vitals taken for this visit.    Physical Exam  Constitutional:       Appearance: Normal appearance.   HENT:      Right Ear: Tympanic membrane normal.      Left Ear: Tympanic membrane normal.      Mouth/Throat:      Mouth: Mucous membranes are dry.      Pharynx: No oropharyngeal exudate or posterior oropharyngeal erythema.   Cardiovascular:      Rate and Rhythm: Normal rate and regular rhythm.      Heart sounds: Normal heart sounds. No murmur heard.     No gallop.   Pulmonary:      Effort: Pulmonary effort is normal. No respiratory distress.      Breath sounds: Normal breath sounds. No wheezing or rhonchi.   Musculoskeletal:      Right lower leg: Edema (trace) present.      Left lower leg: No edema (trace).   Neurological:      Mental Status: She is alert.      Comments: In wheel chair  Muscle atrophy         Assessment/Plan   Problem List Items Addressed This Visit    None    Numbness/tingling  -states it is better  -if worsens again- send for EMG  -labs normal     Dysphagia-due for swallow eval--scheduled for tomorrow     Schizophrenia: she is on risperdal  -off seroquel due to adverse side effects     Chronic GI issues-sees GI   -Hx of Schatzki ring  -RUQ pain: has gallstones and surgery stated not gallbladder. States if has pain-can consider MRI of pancreas as some fullness     Palpitations: holter  neg  -continue metoprolol     Neck stiffness: naproxen as needed  -continue tens unit     Vaginal itching: seeing gyn  -comes and goes     Headaches: ok to use tylenol or naproxen as needed with zofran if nausea     Primary Spastic quadriplegia CP  -has baclofen pump  -doing botox  -in wheelchair  -PT/OT, and ST  -hx of peg     Allergic rhinitis: on flonase  -sees ENT     Had unknown heart procedure: unclear and were not able to find out, normal echo 5/21     Dysphagia: no longer has peg     Vitamin D deficiency-start vitamin D     ASthma: sees Dr. Abarca  -on symbicort, albuterol, and Singulair  -lungs clear today- advised to use nebs     GERD: on omeperazole     Constipation: prn meds     Abnormal menses-SEES GYN        Abnormal TSH level- cont to follow, free t4 ok     Follows with DR. Rima Addison--gyn  GI- Dr. Nance     f/u 4 months.   UTD covid, tdap, pneumovax

## 2025-04-02 ENCOUNTER — APPOINTMENT (OUTPATIENT)
Dept: RADIOLOGY | Facility: HOSPITAL | Age: 30
End: 2025-04-02
Payer: MEDICARE

## 2025-04-15 ENCOUNTER — APPOINTMENT (OUTPATIENT)
Dept: BEHAVIORAL HEALTH | Facility: CLINIC | Age: 30
End: 2025-04-15
Payer: MEDICARE

## 2025-04-15 DIAGNOSIS — F41.9 ANXIETY: ICD-10-CM

## 2025-04-15 DIAGNOSIS — F70 MILD INTELLECTUAL DISABILITY: ICD-10-CM

## 2025-04-15 PROCEDURE — 90834 PSYTX W PT 45 MINUTES: CPT | Performed by: COUNSELOR

## 2025-04-15 NOTE — PROGRESS NOTES
"Total Time: 43 min  Diagnosis: Anxiety, Mild intellectual disability  Visit Type: epic  Reason for visit: managing her worry     - notes her mood has been going up and down  - asked how to deal with staff and not be rude; talked about how when she holds things in, the problem was a \"3\" but by the time she talks about it shes made it a \"10\" in her heads and has blown it totally out of proportion  - talked about a person she works with that may move in; he is going to visit to see how it goes   - notes she continues to cook and bake to reduce her anxiety      focused on:  - active listening, validation   - problem solving; safety, potential new housemate   - coping skills: focusing on music, guided mediation, grounding techniques/distractions, mindfulness, affirmations (continues to ask \"what would you do\" but struggles to implement the interventions)    "

## 2025-04-16 ENCOUNTER — APPOINTMENT (OUTPATIENT)
Dept: PULMONOLOGY | Facility: CLINIC | Age: 30
End: 2025-04-16
Payer: MEDICARE

## 2025-05-02 ENCOUNTER — TELEMEDICINE (OUTPATIENT)
Dept: PRIMARY CARE | Facility: CLINIC | Age: 30
End: 2025-05-02
Payer: MEDICARE

## 2025-05-02 ENCOUNTER — TELEPHONE (OUTPATIENT)
Dept: PRIMARY CARE | Facility: CLINIC | Age: 30
End: 2025-05-02

## 2025-05-02 VITALS — WEIGHT: 167 LBS | BODY MASS INDEX: 34.9 KG/M2

## 2025-05-02 DIAGNOSIS — J45.51 SEVERE PERSISTENT ASTHMA WITH ACUTE EXACERBATION (MULTI): ICD-10-CM

## 2025-05-02 DIAGNOSIS — G80.0 CP (CEREBRAL PALSY), SPASTIC, QUADRIPLEGIC (MULTI): ICD-10-CM

## 2025-05-02 DIAGNOSIS — J20.9 ACUTE BRONCHITIS WITH ASTHMA (HHS-HCC): Primary | ICD-10-CM

## 2025-05-02 DIAGNOSIS — J45.909 ACUTE BRONCHITIS WITH ASTHMA (HHS-HCC): Primary | ICD-10-CM

## 2025-05-02 DIAGNOSIS — F20.9 SCHIZOPHRENIA, UNSPECIFIED TYPE: Chronic | ICD-10-CM

## 2025-05-02 RX ORDER — PREDNISONE 5 MG/1
TABLET ORAL
Qty: 15 TABLET | Refills: 0 | Status: SHIPPED | OUTPATIENT
Start: 2025-05-02 | End: 2025-05-12

## 2025-05-02 RX ORDER — AMOXICILLIN 875 MG/1
875 TABLET, FILM COATED ORAL 2 TIMES DAILY
Qty: 20 TABLET | Refills: 0 | Status: SHIPPED | OUTPATIENT
Start: 2025-05-02 | End: 2025-05-12

## 2025-05-02 ASSESSMENT — ENCOUNTER SYMPTOMS
TREMORS: 0
WOUND: 0
STRIDOR: 0
CHEST TIGHTNESS: 0
APPETITE CHANGE: 0
SINUS PRESSURE: 0
EYE DISCHARGE: 0
CONSTIPATION: 0
VOMITING: 0
ABDOMINAL PAIN: 0
DIFFICULTY URINATING: 0
NECK STIFFNESS: 0
NUMBNESS: 0
BACK PAIN: 0
CONSTITUTIONAL NEGATIVE: 1
DIZZINESS: 0
MYALGIAS: 0
WHEEZING: 0
JOINT SWELLING: 0
GASTROINTESTINAL NEGATIVE: 1
FACIAL ASYMMETRY: 0
HALLUCINATIONS: 0
COLOR CHANGE: 0
SHORTNESS OF BREATH: 0
PALPITATIONS: 0
SEIZURES: 0
POLYDIPSIA: 0
ENDOCRINE NEGATIVE: 1
SLEEP DISTURBANCE: 0
EYES NEGATIVE: 1
TROUBLE SWALLOWING: 0
ADENOPATHY: 0
MUSCULOSKELETAL NEGATIVE: 1
DYSURIA: 0
POLYPHAGIA: 0
NEUROLOGICAL NEGATIVE: 1
ARTHRALGIAS: 0
EYE PAIN: 0
AGITATION: 0
NAUSEA: 0
CARDIOVASCULAR NEGATIVE: 1
DIARRHEA: 0
PSYCHIATRIC NEGATIVE: 1
ALLERGIC/IMMUNOLOGIC NEGATIVE: 1
NECK PAIN: 0
BRUISES/BLEEDS EASILY: 0
CONFUSION: 0
FLANK PAIN: 0
ACTIVITY CHANGE: 0
LIGHT-HEADEDNESS: 0
WEAKNESS: 0
EYE REDNESS: 0
SPEECH DIFFICULTY: 0
SORE THROAT: 0
NERVOUS/ANXIOUS: 0
BLOOD IN STOOL: 0
SINUS PAIN: 0
FREQUENCY: 0
HEMATOLOGIC/LYMPHATIC NEGATIVE: 1
UNEXPECTED WEIGHT CHANGE: 0
HEADACHES: 0
VOICE CHANGE: 0
COUGH: 1

## 2025-05-02 NOTE — PROGRESS NOTES
Subjective   Patient ID: Anisha Quevedo is a 29 y.o. female who presents for Virtual Visit ( Chest Congestion ).    HPI     This is 28 yo patient of Dr. Garcia with h/o  cerebral palsy, asthma, MS, spinal fixation surgery (7/25/2014), GERD, migraines, schizoaffective disorder, psychosis.  I reviewed patient's pertinent history from Epic records.     Virtual or Telephone Consent    An interactive audio and video telecommunication system which permits real time communications between the patient (at the originating site) and provider (at the distant site) was utilized to provide this telehealth service.   Verbal consent was requested and obtained from Anisha Quevedo on this date, 05/02/25 for a telehealth visit and the patient's location was confirmed at the time of the visit.    Patient is accompanied by her nurse Edwina from facility where she resides.  Nurse provides most of history and current symptoms.    C/o head and chest congestion getting worse since last Tuesday despite using breathing treatments and prn cough medications.  Denies fever, chills. Her vitals have been stable.  Nurse reports rhonchi over both lungs and occasional wheezing. Thick sputum.  Pulse ox: 97%.      Review of Systems   Constitutional: Negative.  Negative for activity change, appetite change and unexpected weight change.   HENT:  Positive for congestion. Negative for ear discharge, ear pain, hearing loss, mouth sores, nosebleeds, sinus pressure, sinus pain, sore throat, trouble swallowing and voice change.    Eyes: Negative.  Negative for pain, discharge, redness and visual disturbance.   Respiratory:  Positive for cough. Negative for chest tightness, shortness of breath, wheezing and stridor.    Cardiovascular: Negative.  Negative for chest pain, palpitations and leg swelling.   Gastrointestinal: Negative.  Negative for abdominal pain, blood in stool, constipation, diarrhea, nausea and vomiting.   Endocrine: Negative.  Negative for polydipsia,  polyphagia and polyuria.   Genitourinary: Negative.  Negative for difficulty urinating, dysuria, flank pain, frequency and urgency.   Musculoskeletal: Negative.  Negative for arthralgias, back pain, gait problem, joint swelling, myalgias, neck pain and neck stiffness.   Skin: Negative.  Negative for color change, rash and wound.   Allergic/Immunologic: Negative.  Negative for environmental allergies, food allergies and immunocompromised state.   Neurological: Negative.  Negative for dizziness, tremors, seizures, syncope, facial asymmetry, speech difficulty, weakness, light-headedness, numbness and headaches.   Hematological: Negative.  Negative for adenopathy. Does not bruise/bleed easily.   Psychiatric/Behavioral: Negative.  Negative for agitation, behavioral problems, confusion, hallucinations, sleep disturbance and suicidal ideas. The patient is not nervous/anxious.    All other systems reviewed and are negative.      Objective   Wt 75.8 kg (167 lb)   BMI 34.90 kg/m²     Physical Exam  Constitutional:       Comments: In wheelchair, looks comfortable, answers simple questions   Neurological:      Mental Status: She is alert. Mental status is at baseline.   Psychiatric:         Mood and Affect: Mood normal.         Behavior: Behavior normal.         Assessment/Plan   Problem List Items Addressed This Visit           ICD-10-CM       Mental Health    Schizophrenia (Chronic) F20.9    Stable. Continue current treatment.            Neuro    CP (cerebral palsy), spastic, quadriplegic (Multi) G80.0    Stable. Continue supportive treatment. F/up with neurology.             Pulmonary and Pneumonias    Acute bronchitis with asthma ([facility]) - Primary J20.9, J45.909    Relevant Medications    amoxicillin (Amoxil) 875 mg tablet    predniSONE (Deltasone) 5 mg tablet    Severe persistent asthma with acute exacerbation (Multi) J45.51    Stable. Continue breathing treatments.        It was a pleasure to see you today.  I would  like to remind you about importance of a healthy lifestyle in order to improve your well-being and live longer.  Try to engage in physical activities as tolerated.  Eat about 10 servings of fruits and vegetables daily. My advice is 2 servings of fruits and 8 servings of vegetables.  For vegetables choose at least half of them green and at least half of them fresh.  Please avoid sugar, salt, fried food and saturated fat.    I spent a total of 24  minutes on the date of service for follow up visit, which included preparing to see the patient, face-to-face patient care, completing clinical documentation, obtaining and/or reviewing separately obtained history, performing a medically appropriate examination, counseling and educating the patient/family/caregiver, ordering medications, tests, or procedures, communicating with other health care providers (not separately reported), independently interpreting results (not separately reported), communicating results to the patient/family/caregiver, and care coordination (not separately reported).        F/up as scheduled or sooner if needed.

## 2025-05-07 ENCOUNTER — PATIENT OUTREACH (OUTPATIENT)
Dept: PRIMARY CARE | Facility: CLINIC | Age: 30
End: 2025-05-07
Payer: MEDICARE

## 2025-05-14 ENCOUNTER — HOSPITAL ENCOUNTER (OUTPATIENT)
Dept: RADIOLOGY | Facility: HOSPITAL | Age: 30
Discharge: HOME | End: 2025-05-14
Payer: MEDICARE

## 2025-05-14 DIAGNOSIS — G80.0 CP (CEREBRAL PALSY), SPASTIC, QUADRIPLEGIC (MULTI): ICD-10-CM

## 2025-05-14 DIAGNOSIS — K21.9 GERD (GASTROESOPHAGEAL REFLUX DISEASE): ICD-10-CM

## 2025-05-14 DIAGNOSIS — R13.11 DYSPHAGIA, ORAL PHASE: Primary | Chronic | ICD-10-CM

## 2025-05-14 DIAGNOSIS — R13.10 DYSPHAGIA, UNSPECIFIED TYPE: ICD-10-CM

## 2025-05-14 PROCEDURE — 92611 MOTION FLUOROSCOPY/SWALLOW: CPT | Mod: GN | Performed by: SPEECH-LANGUAGE PATHOLOGIST

## 2025-05-14 PROCEDURE — 2500000005 HC RX 250 GENERAL PHARMACY W/O HCPCS: Performed by: INTERNAL MEDICINE

## 2025-05-14 PROCEDURE — 74230 X-RAY XM SWLNG FUNCJ C+: CPT

## 2025-05-14 PROCEDURE — 74230 X-RAY XM SWLNG FUNCJ C+: CPT | Performed by: RADIOLOGY

## 2025-05-14 RX ADMIN — BARIUM SULFATE 25 ML: 400 SUSPENSION ORAL at 11:01

## 2025-05-14 RX ADMIN — BARIUM SULFATE 5 ML: 400 PASTE ORAL at 11:02

## 2025-05-14 RX ADMIN — BARIUM SULFATE 70 ML: 0.81 POWDER, FOR SUSPENSION ORAL at 11:01

## 2025-05-14 NOTE — PROCEDURES
Speech-Language Pathology    Outpatient Modified Barium Swallow Study    Patient Name: Anisha Quevedo  MRN: [id number]  : 1995  Today's Date: 25     Time Calculation  Start Time: 1036  Stop Time: 1054  Time Calculation (min): 18 min       Modified Barium Swallow Study completed. Informed verbal consent obtained prior to completion of exam. The study was completed per modified  ENTI protocol with various liquid barium consistencies, pudding, soft solids and a 13mm barium tablet.  A 1.9 cm or .75 inch (outer diameter) ring was placed on the chin in the lateral view (did not adhere) and on the lateral, left side of the neck in the A-P view in order to complete objective measurements during swallowing. The anatomic structures and function of the oropharynx, larynx, hypopharynx and cervical esophagus were evaluated. A-P view was not completed due to poor visualization with metal components of patient's motorized wheelchair obstructing view.    SLP: BRADFORD Petit   Contact info: [employer] secure chat; phone: [phone]    Reason for Referral: Dysphagia, recent diet downgrade  Patient Hx:  Patient is a 29 year-old female with PMHx significant for cerebral palsy, asthma, MS, spinal fixation surgery (2014), GERD, Schatzki ring, migraines, schizoaffective disorder, and psychosis, presenting to [facility] this date for modified barium swallow study. Patient was referred by her PCP for MBSS to obtain updated evaluation of dysphagia. Patient's last MBSS was completed 24, and revealed mild oral dysphagia. Patient was recommended for regular solids and thin liquids, with possible downgrade to soft and bite-sized solids if choking episodes occurred.  Upon presentation this date, patient's caregiver from group home reports that patient's diet was downgraded to puree and mildly thick liquids following a hospitalization this past March. Patient takes medications crushed in puree. She has been on antibiotics for a  URI for 1-2 weeks (illness has been circulating among residents of group home).  Respiratory Status: Room air  Current diet: Puree and mildly thick liquids via straw    Pain:  Pain Scale: 0-10  Ratin    FINAL SPEECH RECOMMENDATIONS    DIET:   DIET RECOMMENDATIONS:   - Minced & Moist (IDDSI Level 5)  - Thin liquids (IDDSI Level 0)      STRATEGIES:  - Small bites  - Small, single sips  - Upright for all PO intake  - Reflux Precautions  - Medications crushed in puree (verify with MD)  - Limit distractions  - Supervision with meals; One to one assist PRN      SLP PLAN:  Skilled SLP Services: Skilled SLP intervention for dysphagia is warranted.  SLP Frequency: 2-3 follow up sessions  Duration: 6 weeks  Treatment/Interventions:   - Compensatory strategy training  - Diet tolerance/advancement  - Patient/caregiver education    Discussed POC: Patient  Discussed Risks/Benefits: Yes  Patient/Caregiver Agreeable: Yes    Short term goals established 25:   1. Patient will tolerate recommended PO diet absent of overt s/s of aspiration in 90% of observed therapeutic trials.  2. Patient will implement safe swallowing strategies to reduce risk of aspiration in 90% of trials given caregiver assistance/cueing as needed.  3. Patient will demonstrate adequate mastication of trials of advanced solids in 90% of observed therapeutic trials given minimal-moderate cues.    Education Provided: Patient and caregiver were educated on results of MBS study, recommended diet, and recommended safe swallow strategies. Video of study was reviewed with patient/caregiver. Patient/caregiver verbalized understanding of education provided. Written recommendations form was provided.    Treatment Provided Today: No  Additional Medical Consults Suggested:   - No new disciplines indicated    Repeat Study: Per MD or treating SLP       Mechanics of the Swallow Summary:  ORAL PHASE:  Lip Closure - Profuse escape through open lips   Tongue Control During  Bolus Hold - Escape to lateral buccal cavity and/or floor of mouth   Bolus prep/mastication - Minimal mastication noted with majority of bolus left unchewed   Bolus transport/lingual motion - Slowed Tongue Motion for A-P movement of the bolus   Oral residue - Residue collection on oral structure     PHARYNGEAL PHASE:  Initiation of pharyngeal swallow - Bolus head at pit of pyriforms   Soft palate elevation - No bolus between soft palate/pharyngeal wall   Laryngeal elevation - Complete superior movement of thyroid cartilage with contact of arytenoids to epiglottic petiole   Anterior hyoid excursion - Complete anterior movement   Epiglottic movement - Complete inversion    Laryngeal vestibule closure - Complete - no air/contrast in laryngeal vestibule   Pharyngeal stripping wave - Complete  Pharyngeal contraction (A/P view) - Not tested       Pharyngoesophageal segment opening - Complete distension and complete duration/no obstruction of flow of bolus   Tongue base retraction - No bolus between tongue base and posterior pharyngeal wall   Pharyngeal residue - Trace residue within or on the pharyngeal structures     ESOPHAGEAL PHASE:  Esophageal clearance - Not evaluated       SLP Impressions with Severity Rating:   Pt presents with mild-moderate oral dysphagia and functional pharyngeal swallow upon completion of modified barium swallow study this date. Swallowing physiology is detailed above. Impairments most impacting swallowing safety and efficiency include decreased labial closure, minimal mastication of soft solid trial, and decreased bolus propulsion. Patient demonstrated transient penetration for thin liquids via consecutive straw sips. No further penetration was observed for any other consistency, and no aspiration was visualized during study. Patient demonstrated trace-mild oral and trace pharyngeal residue that was reduced with cued/spontaneous repeat swallow.    A-P view was not completed due to poor  visualization with metal components of patient's motorized wheelchair obstructing view. Of note, patient with known history of GERD and Schatzki ring. Patient follows with GI for these issues.    Per the results of this assessment, recommend patient for minced/moist solids and thin liquids, with additional strategies as noted above. Patient is at risk for aspiration/choking due to baseline of CP and mild intellectual disability. Recommend ongoing ST services to determine possible diet advancement, if patient is able to demonstrate more thorough mastication of solids (was previously on a regular diet). \    OUTCOME MEASURES:  Functional Oral Intake Scale  Functional Oral Intake Scale: Level 5        total oral diet with multiple consistencies, but requires special preparations and compensations         Rosenbek's Penetration Aspiration Scale  Thin Liquids: 2. PENETRATION that CLEARS - contrast enter airway, above vocal cords, no residue  During the Swallow - consecutive straw sips  Nectar Thick Liquids: 1. NO ASPIRATION & NO PENETRATION - no aspiration, contrast does not enter airway  Puree: 1. NO ASPIRATION & NO PENETRATION - no aspiration, contrast does not enter airway  Soft Solids: 1. NO ASPIRATION & NO PENETRATION - no aspiration, contrast does not enter airway

## 2025-05-21 ENCOUNTER — APPOINTMENT (OUTPATIENT)
Dept: BEHAVIORAL HEALTH | Facility: CLINIC | Age: 30
End: 2025-05-21
Payer: MEDICARE

## 2025-05-21 DIAGNOSIS — F70 MILD INTELLECTUAL DISABILITY: ICD-10-CM

## 2025-05-21 DIAGNOSIS — F41.9 ANXIETY: ICD-10-CM

## 2025-05-21 PROCEDURE — 90834 PSYTX W PT 45 MINUTES: CPT | Performed by: COUNSELOR

## 2025-05-21 NOTE — PROGRESS NOTES
"Total Time: 43 min  Diagnosis: Anxiety, Mild intellectual disability  Visit Type: epic  Reason for visit: managing her worry    - staff notes things are \"ok\": she is still hallucinating, really wants the  to come, seeing things, talking to \"little girls\"; overall mood has been \"good\"  - talked about the man that will move in; he did visit, it went well, but there is still some anxiety about it, feels he is rude  - did talk about the \"people in the attic\" when noting that they didnt have an attic, she did say \"I know\" but continued to talk about their presence, so we focused on her coping skills  - talked about her continued relationship and struggles with staff and peers; struggles with mothers day  - talked about her outings, advocacy group, her upcoming bday       focused on:  - active listening, validation   - problem solving  - coping skills: focusing on music, guided mediation, grounding techniques/distractions, mindfulness, affirmations (new one of reading about hair and how to take care of it, cooking/baking)  - boundaries with the new house mate      "

## 2025-05-27 ENCOUNTER — APPOINTMENT (OUTPATIENT)
Dept: BEHAVIORAL HEALTH | Facility: CLINIC | Age: 30
End: 2025-05-27
Payer: MEDICARE

## 2025-05-28 ENCOUNTER — APPOINTMENT (OUTPATIENT)
Dept: BEHAVIORAL HEALTH | Facility: CLINIC | Age: 30
End: 2025-05-28
Payer: MEDICARE

## 2025-05-28 DIAGNOSIS — F70 MILD INTELLECTUAL DISABILITY: ICD-10-CM

## 2025-05-28 DIAGNOSIS — F20.0 PARANOID SCHIZOPHRENIA (MULTI): Primary | ICD-10-CM

## 2025-05-28 DIAGNOSIS — G80.0 CP (CEREBRAL PALSY), SPASTIC, QUADRIPLEGIC (MULTI): ICD-10-CM

## 2025-05-28 DIAGNOSIS — F41.9 ANXIETY: ICD-10-CM

## 2025-05-28 DIAGNOSIS — G47.09 OTHER INSOMNIA: ICD-10-CM

## 2025-05-28 PROCEDURE — G2211 COMPLEX E/M VISIT ADD ON: HCPCS | Performed by: PSYCHIATRY & NEUROLOGY

## 2025-05-28 PROCEDURE — G2212 PROLONG OUTPT/OFFICE VIS: HCPCS | Performed by: PSYCHIATRY & NEUROLOGY

## 2025-05-28 PROCEDURE — 99215 OFFICE O/P EST HI 40 MIN: CPT | Performed by: PSYCHIATRY & NEUROLOGY

## 2025-05-28 PROCEDURE — 1036F TOBACCO NON-USER: CPT | Performed by: PSYCHIATRY & NEUROLOGY

## 2025-05-28 RX ORDER — HYDROXYZINE HYDROCHLORIDE 50 MG/1
50 TABLET, FILM COATED ORAL NIGHTLY
Qty: 30 TABLET | Refills: 11 | Status: SHIPPED | OUTPATIENT
Start: 2025-05-28

## 2025-05-28 RX ORDER — MELATONIN 5 MG
5 CAPSULE ORAL NIGHTLY
Qty: 30 CAPSULE | Refills: 11 | Status: SHIPPED | OUTPATIENT
Start: 2025-05-28

## 2025-05-28 ASSESSMENT — ENCOUNTER SYMPTOMS
NAUSEA: 0
DIZZINESS: 0
SHORTNESS OF BREATH: 0
SEIZURES: 0
HALLUCINATIONS: 1
TROUBLE SWALLOWING: 1
DYSURIA: 0
SLEEP DISTURBANCE: 0
TREMORS: 0
VOMITING: 0
PALPITATIONS: 0
ABDOMINAL DISTENTION: 0
COUGH: 1
CHOKING: 0

## 2025-05-28 NOTE — PATIENT INSTRUCTIONS
"I am still a bit uncertain about how to interpret Anisha's beliefs that there are people in the ceiling or attic of the home that are calling out to Anisha for help. Because these auditory hallucinations and delusions don't seem to be particularly \"consuming\" or driving her to act in unusual or disorganized ways could indicate that they are more consistent with intrusive thoughts or worries or manifestations of anxiety rather than true psychotic phenomena. Her overall affect and demeanor today is somewhat more expressive and reactive than I had observed at our last visit.     We will plan to maintain/continue her current dose of risperidone for now and re-evaluate at an in-person office visit in about 2 months.    Next appointment: Friday 8/01/25 at 4:00 PM in[facility]person at [facility].  "

## 2025-05-28 NOTE — PROGRESS NOTES
"Outpatient Adult IDD Psychiatry    A HIPAA-compliant interactive audio and video telecommunication system which permits real time communications between the patient (at the originating site) and provider (at the distant site) was utilized to provide this telehealth service.     The patient, family, caregivers and guardian (as appropriate) have provided consent to conduct treatment via this telehealth service.      The patient's identity and physical location were verified at the time of this visit.     Present for appointment: Anisha [facility] ([facility]).    Appointment location: Home.  [place], [place]    SUBJECTIVE    Anisha has generally been doing well since we last met.    Repeat MBSS done 5/14/25 with diet slightly upgraded to minced and moist with thin liquids.    She is a bit nervous about a new male house-mate moving in tomorrow.  She has known this individual for a few years and he has been over to meet with the other house-mates and get acclimated to the home.  Anisha seems worried about whether staff will need to divert more time and attention to him when he moves in.    She has not had any severe episodes of anxiety requiring use of as-needed lorazepam.    She indicates that she still hears different people in the \"attic\" portion of the home calling out to her for help.  She assumes that since she hears this, the other staff can hear it too and that they are intentionally ignoring it.  She also talks about experiencing episodes where members of dad's side of the family have \"visited\" her in the home or appeared in her bedroom.    There have not been any allegations about mistreatment of herself or anyone else in the home by staff since we last met.    She has not made any decisions about whether or not to remain at [facility] or transfer to a different [facility] within the agency.    Reviewed data tracking from April and May:     A B C D E F G H   Jan-25 19 5 9 3 4 4 0 0   Feb-25 13 4 12 8 7 2 3 6 "   Mar-25 0 2 2 0 0 0 0 0   Apr-25 12 0 11 0 0 0 0 0   May-25 11 0 7 0 0 0 0 0     Key:  A: Visual and/or auditory hallucinations  B: Loss of appetite  C: Repetitive gestures or movements  D: Intense levels of anxiety and fear  E: Calling for staff over and over again, asking a lot of questions or repeated questions  F: Saying that she is experiencing chest pains and/or needs to go to the hospital, etc.  G: Excessive requests to use the restroom  H: Vivid, bizarre, overly embellished, paranoid thoughts or statements (accusations of others trying to physically harm her, stealing from her, putting things in her, out to get her, etc.)    Review of Systems   HENT:  Positive for trouble swallowing. Negative for drooling.    Respiratory:  Positive for cough (HFCWO vest). Negative for choking and shortness of breath.    Cardiovascular:  Negative for chest pain and palpitations.   Gastrointestinal:  Negative for abdominal distention, nausea and vomiting.   Genitourinary:  Negative for dysuria and vaginal pain.   Neurological:  Negative for dizziness, tremors and seizures.   Psychiatric/Behavioral:  Positive for hallucinations. Negative for sleep disturbance and suicidal ideas.       Controlled Substance Evaluation  OARRS/PDMP reviewed: Matt Stevens MD on 5/28/2025  8:24 AM  Is the patient prescribed a combination of a benzodiazepine and opioid? No  I have personally reviewed the OARRS report for Anisha Quevedo.   I have considered the risks of abuse, dependence, addiction and diversion.    I believe that it is clinically appropriate for Anisha Quevedo to be prescribed this medication.    Last Urine Drug Screen:  No results found for this or any previous visit (from the past 8760 hours).  Clinical rationale for not completing a Urine Drug Screen: UDS Not Completed: Patient in managed care facility (nursing home, assisted living, other where patient does not self-administer medication)    Controlled Substance Agreement:  N/A acute short-term PRN use only  Reviewed Controlled Substance Agreement, including but not limited to:  Benefits, risks, and alternatives to treatment with a controlled substance medication(s).    Prescribed Controlled Substances:  > Benzodiazepines: Lorazepam (as-needed)  What is the patient's goal of therapy? Reduce anxiety associated with schizophrenia  Is this being achieved with current treatment? Yes  Activities of Daily Living:   Is your overall impression that this patient is benefiting (symptom reduction outweighs side effects) from benzodiazepine therapy? Yes   1. Physical Functioning: Same  2. Family Relationship: Same  3. Social Relationship: Same  4. Mood: Same  5. Sleep Patterns: Same  6. 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   Work or day program [employer] 5 days/week   School N/A   Guardianship Self   SSA N/A   Bx Specialist N/A   Nicotine None   Alcohol None   Other drugs None     OBJECTIVE    Lab Results   Component Value Date    HGB 13.6 01/27/2025     01/27/2025    GLUCOSE 94 01/27/2025     01/27/2025    K 4.0 01/27/2025    CO2 23 01/27/2025    CALCIUM 9.8 01/27/2025    CREATININE 0.40 (L) 01/27/2025    AST 15 01/27/2025    ALT 13 01/27/2025    TSH 0.97 03/18/2025    FREET4 0.86 11/22/2023    CHOL 188 03/18/2025    LDLCALC 103 (H) 03/18/2025    TRIG 99 03/18/2025    AWMQDRTP70 570 03/18/2025    VITD25 23 (L) 11/22/2023    IRON 68 11/22/2023    IRONSAT 19 (L) 11/22/2023     Therapeutic Drug Monitoring  N/A    Electrocardiograms  01/27/2025: NSR, VR 83, QTc 413  08/16/2024: NSR, VR 86, QTc 414  06/10/2024: NSR, non-sp ST/TW abn, , QTc 394  12/06/2023: NSR, VR 93, QTc 425  05/19/2021: NSR, VR 80, QTc 405    MENTAL STATUS EXAM  General/Appearance: Appropriate  "dress/hygiene/grooming.  Attitude/Behavior: Average eye contact. Calm/pleasant. Does not seem particularly upset when delusions are challenged. She attempts to extend the visit by asking a very complex question right at the end while we are attempting to wrap up.  Speech/Communication: Normal volume/rate/tone. Spastic.  Motor: Spasticity.  Gait: In wheelchair.  Mood: Calm/pleasant.  Affect: Generally quite happy and upbeat. Only briefly seems blank/flat/withdrawn.  Thought processes: Mostly organized and coherent.  Thought content: Persisting delusional themes. Only briefly dwells on delusional topics and easily redirected. She is not being driven to do bizarre or unsafe things at home by her delusional beliefs.  Perception: Does not appear to be experiencing or responding to hallucinations. Does not report any auditory or visual hallucinations.  Sensorium/Cognition: Alert, awake, oriented to person/place/time. Intellectual impairment.  Memory: Recent & remote recall is intact.  Insight: Impaired reality testing. Not willing to concede that auditory hallucinations may only be experienced by her.  Judgment: Fair.     ASSESSMENT  I am still a bit uncertain about how to interpret Anisha's beliefs that there are people in the ceiling or attic of the home that are calling out to Anisha for help.  Because these auditory hallucinations and delusions don't seem to be particularly \"consuming\" or driving her to act in unusual or disorganized ways could indicate that they are more consistent with intrusive thoughts or worries or manifestations of anxiety rather than true psychotic phenomena.  Her overall affect and demeanor today is somewhat more expressive and reactive than I had observed at our last visit.  I think if these symptoms persist I would probably advocate more strongly for a trial of an add-on antidepressant to target anxiety and trauma symptoms before increasing risperidone again.    PROBLEM LIST  Intellectual " developmental disorder, mild  Schizophrenia  Anxiety disorder, r/o PTSD    PLAN  -- Continue risperidone 1mg - 1mg - 4mg (AM, 14:00, HS) for psychosis  -- Continue melatonin 5mg at bedtime for sleep  -- Continue hydroxyzine 50mg at bedtime for sleep and anxiety  -- PRN: lorazepam 1mg Q6H for anxiety or insomnia associated with schizophrenia  -- Follow up 2 months    Matt Stevens MD    Prep time on date of the patient encounter: 5 minutes   Time spent directly with patient/family/caregiver: 55 minutes   Additional time spent on patient care activities: 0 minutes   Documentation time: 15 minutes   Other time spent: 0 minutes   Total time on date of patient encounter: 75 minutes          [1]   Outpatient Medications Prior to Visit   Medication Sig Dispense Refill    acetaminophen (Tylenol) 325 mg tablet Take 1-2 tablets (325-650 mg) by mouth every 4 hours if needed for fever (temp greater than 38.0 C) (or pain). No more than 3000 mg per day      acetylcysteine (Mucomyst) 200 mg/mL (20 %) nebulizer solution       albuterol 2.5 mg /3 mL (0.083 %) nebulizer solution Take 3 mL (2.5 mg) by nebulization every 4 hours if needed for wheezing. 300 mL 1    alum-mag hydroxide-simeth (Maalox MAX) 400-400-40 mg/5 mL suspension Take 15 mL by mouth once daily as needed.      ammonium lactate (Amlactin) 12 % cream Apply 1 Application topically once daily. Apply to right heal      azelastine (Astelin) 137 mcg (0.1 %) nasal spray Administer 2 sprays into each nostril once daily. Use in each nostril as directed 30 mL 11    baclofen (Lioresal) 20 mg tablet Take 1 tablet (20 mg) by mouth 2 times a day. 60 tablet 11    benzonatate (Tessalon) 100 mg capsule Take 1 capsule (100 mg) by mouth 3 times a day as needed for cough. Do not crush or chew. 20 capsule 0    cholecalciferol (Vitamin D3) 50 mcg (2,000 unit) capsule Take by mouth once daily.      ciclopirox (Loprox) 0.77 % gel Apply 1 Application topically once daily. Apply to  right great toenail      docusate sodium (Colace) 100 mg capsule Take 1 capsule (100 mg) by mouth 2 times a day as needed for constipation. Over the Counter 60 capsule 1    famotidine (Pepcid) 20 mg tablet Take 1 tablet (20 mg) by mouth once daily. Take on an empty stomach 90 tablet 0    ferrous sulfate 325 (65 Fe) MG tablet Take 1 tablet (325 mg) by mouth once daily.      fluticasone (Flonase) 50 mcg/actuation nasal spray Administer 2 sprays into each nostril once daily. Shake gently. Before first use, prime pump. After use, clean tip and replace cap. 16 g 12    fluticasone furoate-vilanteroL (Breo Ellipta) 200-25 mcg/dose inhaler Inhale 1 puff once daily. Rinse mouth with water after use to reduce aftertaste and incidence of candidiasis. Do not swallow. 1 each 3    hydrOXYzine HCL (Atarax) 50 mg tablet Take 1 tablet (50 mg) by mouth once daily at bedtime. 30 tablet 11    inhaler,assist device,lg mask (AEROCHAMBER PLUS FLOW-VU,L MSK MISC) Use as directed with inhaler      ipratropium-albuteroL (Duo-Neb) 0.5-2.5 mg/3 mL nebulizer solution Take 3 mL by nebulization 3 times a day. 180 mL 2    lidocaine HCL 4 % adhesive patch,medicated Apply 1 patch topically once daily as needed (rib pain). 30 patch 1    loperamide (Imodium) 2 mg capsule       LORazepam (Ativan) 1 mg tablet Take 1 tablet (1 mg) by mouth as needed every 6 hours for symptoms of psychosis (reported hallucinations, paranoid thinking, severe anxiety, screaming/yelling, and excessive reassurance-seeking). 60 tablet 0    melatonin 5 mg capsule Take 1 capsule (5 mg) by mouth once daily at bedtime. 30 capsule 11    metoprolol tartrate (Lopressor) 25 mg tablet Take 0.5 tablets (12.5 mg) by mouth 2 times a day.      montelukast (Singulair) 10 mg tablet Take 1 tablet (10 mg) by mouth once daily at bedtime.      multivitamin tablet Take 1 tablet by mouth every other day.      mupirocin (Bactroban) 2 % ointment Apply a pea-size amount inside both nostrils once a  day      naproxen (Naprosyn) 250 mg tablet       nystatin (Mycostatin) 100,000 unit/gram powder Apply 1 Application topically if needed for itching. 15 g 3    omeprazole OTC (PriLOSEC OTC) 20 mg EC tablet Take 2 tablets (40 mg) by mouth once daily in the morning. Take before meals AND 1 tablet (20 mg) once daily in the evening.  Take before meals. Take 30-60 minutes before breakfast. 90 tablet 11    ondansetron (Zofran) 4 mg tablet       polyethylene glycol (Glycolax, Miralax) 17 gram/dose powder Take 17 g by mouth 2 times a day. Over the counter 850 g 11    Refresh Plus 0.5 % ophthalmic solution Administer 1 drop into both eyes 2 times a day.      risperiDONE (RisperDAL) 1 mg tablet Take 1 tablet (1 mg) by mouth twice daily - morning and 2pm. 60 tablet 5    risperiDONE (RisperDAL) 4 mg tablet Take 1 tablet (4 mg) by mouth once daily at bedtime. 30 tablet 5    sodium chloride (Nasal Moisturizing) 0.65 % nasal spray Administer 1 spray into each nostril 2 times a day as needed (for dryness).       No facility-administered medications prior to visit.

## 2025-05-30 NOTE — PROGRESS NOTES
Patient: Anisha Quevedo    [id number]  : 1995 -- AGE 29 y.o.    Provider: Jackie LEUNG CNP     [facility]   Service Date: 25       Department of Medicine  Division of Pulmonary, Critical Care, and Sleep Medicine         [facility]  Follow Up Visit Note      HISTORY OF PRESENT ILLNESS     HISTORY OF PRESENT ILLNESS   Anisha Quevedo is a 29 y.o. female who has a history of cerebral palsy, asthma, she is a never smokers, who presents to a [facility] for a follow up evaluation for asthma.    DATE OF LAST VISIT: 23    Current History    Since last visit  OLDCART  cough  wheeze  Fevers/chills  FISCHER  SOB at rest  chest pain  allergies  GERD  ED visits  Up to date on vaccines  Night time  Day time  He is only troubled by breathlessness except on strenuous exercise (mMRC 0).  They are s short of breath when hurrying on level ground or walking up a slight hill (mMRC 1).  They are  have  stop for breath when walking at her own pace on level ground at about 15 minutes (mMRC 2).  CAT  ACT    10/30/24: Since last visit she reports feeling back to baseline after having bilateral pneumonia in  and Covid? in August. She started Breo 200, gets nebs 3 times a day, percussion vest BID and mucinex as needed. Reports intermittent cough that is norm, reports chest pain yesterday and this morning, nurse came to assess her and give her breathing treatments. Will refer to cardiology for recurrent chest pain.    24: Since last visit she had an ED visit for an asthma exacerbation/pneumonia on 2024.  She was treated with duo nebs, steroids and magnesium.  CT neg for PE.  Today caregiver is speaking for Mrs. Quevedo due to her not feeling well and COVID going around the home and where she lives then.  She states she tested negative for COVID but is resting now.    Since hospital stay she is no longer short of breath.  Seen  PCP who heard Stanislav Williamson in upper airways and extended prednisone 10 mg daily.  Nurse states that she is breathing more shallow and not taking deep breaths during breathing treatments but they are encouraging her to try to take deeper breaths during treatments. She has a weak cough and unable to cough mucus out despite using acapella and mucinex. Nurse states that she sounds congested despite nebs, acapella and mucinex. Her SpO2 today 95% on room air. Patient has tried and failed acapella flutter device as well as mucinex for mucus releif. Has an insufficient expiratory drive 2/2 cerebral palsy.     12/6/23: On today's visit, the patient is accompanied by a caregiver.  I called and spoke with her nurse at the facility she lives in to discuss the course of the last few months. She had a URI on 11/21/2023, PCP heard rhonchi on exam, she took a steroid burst  and Z-Waylon for 5 days. She reports feeling the same from a respiratory standpoint.  Occasional wheezing and cough. States she feels like her mucus is stuck in chest.  Denies shortness of breath at rest she did have a hospital visit a few months ago for the flu.  Today she is reporting sharp chest pain that is constant.  She has a history of anxiety and is not sure if it is anxiety related. Her nurse reports she has had a full cardiac workup for chest pains over the past 2 years and has been cleared by cardiology.    5/25/23: Mrs. Quevedo who has cerebral palsy and wheel chair bound is here accompanied by her caregiver. She uses symbicort 2 puffs twice a day. Duonebs twice a day. She reports an occasional cough. She says she noticed yesterday that she felt a little congested and SOB, duonebs helped. Denies wheezing, fevers chills, chest pain and ER visits for breathing issues.      Previous pulmonary history: She was previously told she has asthma. She currently is on no supplemental oxygen. She has never been to pulmonary rehab. Does not recall having AECOPD requiring  antibiotics or prednisone.     Inhalers/nebulized medications: symbicort, Duo-nebs     Hospitalization History: She has not been hospitalized over the last year for breathing related problem.     Sleep history: Denies snoring, apnea, feeling tired during the day or taking naps during the day.        REVIEW OF SYSTEMS     REVIEW OF SYSTEMS  Review of Systems    Constitutional: No fever, no chills, no night sweats.    Eyes: No double vision, no floaters, no dry eyes.   ENT: See HPI.   Neck: No neck stiffness.  Cardiovascular: No sharp chest pain, no heart racing, no leg swelling.  Respiratory: as noted in HPI.   Integumentary: No rashes or sores.  Neurological: No dizziness, no headaches. Sleeping well.  Psychiatric: No mood changes.     ALLERGIES AND MEDICATIONS     ALLERGIES  No Known Allergies    MEDICATIONS  Current Outpatient Medications   Medication Sig Dispense Refill    acetaminophen (Tylenol) 325 mg tablet Take 1-2 tablets (325-650 mg) by mouth every 4 hours if needed for fever (temp greater than 38.0 C) (or pain). No more than 3000 mg per day      acetylcysteine (Mucomyst) 200 mg/mL (20 %) nebulizer solution       albuterol 2.5 mg /3 mL (0.083 %) nebulizer solution Take 3 mL (2.5 mg) by nebulization every 4 hours if needed for wheezing. 300 mL 1    alum-mag hydroxide-simeth (Maalox MAX) 400-400-40 mg/5 mL suspension Take 15 mL by mouth once daily as needed.      ammonium lactate (Amlactin) 12 % cream Apply 1 Application topically once daily. Apply to right heal      azelastine (Astelin) 137 mcg (0.1 %) nasal spray Administer 2 sprays into each nostril once daily. Use in each nostril as directed 30 mL 11    baclofen (Lioresal) 20 mg tablet Take 1 tablet (20 mg) by mouth 2 times a day. 60 tablet 11    benzonatate (Tessalon) 100 mg capsule Take 1 capsule (100 mg) by mouth 3 times a day as needed for cough. Do not crush or chew. 20 capsule 0    cholecalciferol (Vitamin D3) 50 mcg (2,000 unit) capsule Take by  mouth once daily.      ciclopirox (Loprox) 0.77 % gel Apply 1 Application topically once daily. Apply to right great toenail      docusate sodium (Colace) 100 mg capsule Take 1 capsule (100 mg) by mouth 2 times a day as needed for constipation. Over the Counter 60 capsule 1    famotidine (Pepcid) 20 mg tablet Take 1 tablet (20 mg) by mouth once daily. Take on an empty stomach 90 tablet 0    ferrous sulfate 325 (65 Fe) MG tablet Take 1 tablet (325 mg) by mouth once daily.      fluticasone (Flonase) 50 mcg/actuation nasal spray Administer 2 sprays into each nostril once daily. Shake gently. Before first use, prime pump. After use, clean tip and replace cap. 16 g 12    fluticasone furoate-vilanteroL (Breo Ellipta) 200-25 mcg/dose inhaler Inhale 1 puff once daily. Rinse mouth with water after use to reduce aftertaste and incidence of candidiasis. Do not swallow. 1 each 3    hydrOXYzine HCL (Atarax) 50 mg tablet Take 1 tablet (50 mg) by mouth once daily at bedtime. 30 tablet 11    inhaler,assist device,lg mask (AEROCHAMBER PLUS FLOW-VU,L MSK MISC) Use as directed with inhaler      ipratropium-albuteroL (Duo-Neb) 0.5-2.5 mg/3 mL nebulizer solution Take 3 mL by nebulization 3 times a day. 180 mL 2    lidocaine HCL 4 % adhesive patch,medicated Apply 1 patch topically once daily as needed (rib pain). 30 patch 1    loperamide (Imodium) 2 mg capsule       LORazepam (Ativan) 1 mg tablet Take 1 tablet (1 mg) by mouth as needed every 6 hours for symptoms of psychosis (reported hallucinations, paranoid thinking, severe anxiety, screaming/yelling, and excessive reassurance-seeking). 60 tablet 0    melatonin 5 mg capsule Take 1 capsule (5 mg) by mouth once daily at bedtime. 30 capsule 11    metoprolol tartrate (Lopressor) 25 mg tablet Take 0.5 tablets (12.5 mg) by mouth 2 times a day.      montelukast (Singulair) 10 mg tablet Take 1 tablet (10 mg) by mouth once daily at bedtime.      multivitamin tablet Take 1 tablet by mouth every  other day.      mupirocin (Bactroban) 2 % ointment Apply a pea-size amount inside both nostrils once a day      naproxen (Naprosyn) 250 mg tablet       nystatin (Mycostatin) 100,000 unit/gram powder Apply 1 Application topically if needed for itching. 15 g 3    omeprazole OTC (PriLOSEC OTC) 20 mg EC tablet Take 2 tablets (40 mg) by mouth once daily in the morning. Take before meals AND 1 tablet (20 mg) once daily in the evening.  Take before meals. Take 30-60 minutes before breakfast. 90 tablet 11    ondansetron (Zofran) 4 mg tablet       polyethylene glycol (Glycolax, Miralax) 17 gram/dose powder Take 17 g by mouth 2 times a day. Over the counter 850 g 11    Refresh Plus 0.5 % ophthalmic solution Administer 1 drop into both eyes 2 times a day.      risperiDONE (RisperDAL) 1 mg tablet Take 1 tablet (1 mg) by mouth twice daily - morning and 2pm. 60 tablet 5    risperiDONE (RisperDAL) 4 mg tablet Take 1 tablet (4 mg) by mouth once daily at bedtime. 30 tablet 5    sodium chloride (Nasal Moisturizing) 0.65 % nasal spray Administer 1 spray into each nostril 2 times a day as needed (for dryness).       No current facility-administered medications for this visit.         PAST HISTORY     PAST MEDICAL HISTORY  Asthma  Anxiety  GERD     PAST SURGICAL HISTORY  Past Surgical History:   Procedure Laterality Date    ESOPHAGOGASTRODUODENOSCOPY  05/29/2024    OTHER SURGICAL HISTORY  03/03/2021    Hip surgery    OTHER SURGICAL HISTORY  08/22/2019    Percutaneous endoscopic gastrostomy tube insertion    OTHER SURGICAL HISTORY  02/19/2020    Heart surgery    SPINAL FIXATION SURGERY  07/25/2014    Spinal Arthrodesis For Deformity By Posterior Approach       IMMUNIZATION HISTORY  Immunization History   Administered Date(s) Administered    COVID-19, mRNA, LNP-S, PF, 30 mcg/0.3 mL dose 11/05/2021    Flu vaccine (IIV4), preservative free *Check age/dose* 10/07/2020, 10/04/2021, 09/09/2022, 10/11/2022, 11/01/2023    Flu vaccine, trivalent,  preservative free, age 6 months and greater (Fluarix/Fluzone/Flulaval) 12/23/2009    Hepatitis A vaccine, pediatric/adolescent (HAVRIX, VAQTA) 06/19/2013, 09/03/2014    Influenza, seasonal, injectable 12/08/2006, 12/05/2008    Meningococcal ACWY vaccine (MENVEO) 09/03/2014    Meningococcal ACWY-D (Menactra) 4-valent conjugate vaccine 12/05/2008    Novel influenza-H1N1-09, preservative-free 12/23/2009    Pfizer COVID-19 vaccine, 12 years and older, (30mcg/0.3mL) (Comirnaty) 11/01/2023    Pfizer COVID-19 vaccine, bivalent, age 12 years and older (30 mcg/0.3 mL) 10/11/2022    Pfizer Purple Cap SARS-CoV-2 01/14/2021, 02/04/2021    Pneumococcal polysaccharide vaccine, 23-valent, age 2 years and older (PNEUMOVAX 23) 10/13/2020    RSV-MAb 11/01/2023    Tdap vaccine, age 7 year and older (BOOSTRIX, ADACEL) 12/05/2008, 10/13/2020    Varicella vaccine, subcutaneous (VARIVAX) 12/05/2008       SOCIAL HISTORY  Tobacco Smoking: never  Illicit drugs: none  Alcohol consumption: none  Pets: none    OCCUPATIONAL/ENVIRONMENTAL HISTORY  Occupation: Disability.  No have known exposure to asbestos, silica, beryllium or inhaled metals.    FAMILY HISTORY  Family History   Problem Relation Name Age of Onset    Hypertension Sister Manuela     Leukemia Sister Aishafy     Schizophrenia Sister Tany      No have a family history of pulmonary disease.  No have a family history of cancer.    PHYSICAL EXAM     VITAL SIGNS:   There were no vitals filed for this visit.        PREVIOUS WEIGHTS: There is no height or weight on file to calculate BMI.    Wt Readings from Last 3 Encounters:   05/02/25 75.8 kg (167 lb)   04/01/25 77.6 kg (171 lb)   03/27/25 78 kg (172 lb)       Physical Exam    Constitutional: General appearance: Alert and oriented.  No acute distress. Well developed, well nourished.  Head and face: Symmetric  Pulmonary: Chest is normal. No increased work of breathing or signs of respiratory distress. Clear to auscultation bilaterally - no  crackles, wheezing, or rhonchi.   Cardiovascular: Heart rate and rhythm normal. Normal S1, S2 - no murmurs, gallops, or pericardial rub.   Abdomen: Soft, non tender, +BS  Extremities: No edema. No clubbing or cyanosis of the fingernails.    Neurologic: Moves all four extremities   MSK: Normal movements of extremities. Gait normal   Psychiatric: Intact judgement and insight.    RESULTS/DATA     Pulmonary Function Test Results   Pulmonary function testing    Pulmonary Function Test - Scan on 12/1/2023  2:55 PM: Pulmonary Function Test        Chest Radiograph   XR chest 1 view  Status: Final result     PACS Images     Show images for XR chest 1 view  Signed by    Signed Time Phone Pager   Yosvany Camacho MD, MS 1/27/2025 12:28 [phone]      Exam Information    Status Exam Begun Exam Ended   Final 1/27/2025 11:15 1/27/2025 11:25     Study Result    Narrative & Impression   STUDY:  Chest Radiograph;  1/27/25 at 11:26 AM  INDICATION:  Chest pain, shortness of breath.  Evaluate for pneumonia and rib  fracture.  COMPARISON:  Chest XR 8/16/24.  ACCESSION NUMBER(S):  [id number]  ORDERING CLINICIAN:  RETA AGUIAR  TECHNIQUE:  Frontal chest was obtained at 1125 hours.  FINDINGS:  Frontal view of the chest demonstrate the mediastinum is unremarkable.   No focal consolidations, pleural effusions pneumothorax or  interstitial edema.  There are bilateral metallic fixating rods and  cerclage wires found throughout the thoracic spine that extends into  the lumbar spine.  IMPRESSION:  No acute cardiopulmonary process.  Signed by Yosvany Camacho MD       Chest CT Scan     Interpreted By:  Kaleb Rivera,   STUDY:  CT ANGIO CHEST FOR PULMONARY EMBOLISM;  8/16/2024 10:37 pm      INDICATION:  Signs/Symptoms:elevated dimer.      COMPARISON:  CT scan of the chest 06/07/2024      ACCESSION NUMBER(S):  [id number]      ORDERING CLINICIAN:  JOEY PAYNE      TECHNIQUE:  Helical data acquisition of the chest was obtained  after intravenous  administration of  75 mL of Omnipaque 350. Images were reformatted in  axial, coronal, and sagittal planes. Axial and coronal MIPS were  reconstructed and reviewed.      FINDINGS:  HEART AND VESSELS:  No acute pulmonary embolism to the  proximal segmental arterial  level. Evaluation distally is limited.      Main pulmonary artery and its branches are normal in caliber.      The thoracic aorta is of normal course and caliber  without vascular  calcifications.      No coronary artery calcifications are seen.The study is not optimized  for evaluation of coronary arteries.      The cardiac chambers are not enlarged.      No evidence of pericardial effusion.      MEDIASTINUM AND GEOVANI, LOWER NECK AND AXILLA:  The visualized thyroid gland is within normal limits.      No evidence of thoracic lymphadenopathy by CT criteria.      Esophagus appears within normal limits as seen.      LUNGS AND AIRWAYS:  The trachea and central airways are patent. No endobronchial lesion.      Motion artifact limits evaluation of the lung parenchyma. Bibasilar  atelectasis. No consolidation, effusion or pneumothorax. Subtle  ground-glass opacities in the upper lobes may relate to atelectasis.  Note is made of expiratory phase of imaging.      UPPER ABDOMEN:  The visualized subdiaphragmatic structures demonstrate no remarkable  findings.      CHEST WALL AND OSSEOUS STRUCTURES:  Postsurgical changes of thoracolumbar spine fusion. S shaped  scoliosis. Surgical hardware causes significant beam hardening  artifact limiting evaluation of the adjacent soft tissues. Multilevel  degenerative changes are present.      IMPRESSION:  1. No acute pulmonary embolism to the proximal segmental arterial  level.  2. Note is made of expiratory phase of imaging. Subtle ground-glass  opacities in the upper lobes favored to relate to atelectasis.  Correlate clinically if there is concern for superimposed infection.  3. Additional findings as  described above.          MACRO:  None      Signed by: Kaleb Rivera 8/16/2024 11:00 PM  Dictation workstation:   [id number]    Echocardiogram     [facility], [address] [place] [address]              Tel [phone] and Fax [phone]     TRANSTHORACIC ECHOCARDIOGRAM REPORT        Patient Name:     JB Rivera           [id number] [address] Rao                                 Physician:        MD  Study Date:       5/3/2021     Referring         CASSIDY RAMIREZ                                 Physician:  MRN/PID:          [id number]     PCP:  Accession/Order#: [id number] Department        [facility]                                 Location:  YOB: 1995    Fellow:  Gender:           F            Nurse:  Admit Date:                    Sonographer:      Mayelin Garcia RDCS  Admission Status: Outpatient   Additional Staff:  Height:           149.86 cm    CC Report to:  Weight:           64.86 kg     Study Type:       Echocardiogram  BSA:              1.60 m2  Blood Pressure: 99 /69 mmHg     Diagnosis/ICD: R07.9-Chest pain, unspecified  Indication:    Chest Pain  Procedure/CPT: Echo Complete w Full [id number]     Patient History:  Pertinent History: Chest Pain and Dyspnea. COVID-19 recovered (12/16/2020),                     tachycardia, schizophrenia, cerebral palsy-quadriplegic.     Study Detail: The following Echo studies were performed: 2D, M-Mode, Doppler and                color flow. Technically challenging study due to patient sitting                in wheel chair.        PHYSICIAN INTERPRETATION:  Left Ventricle: The left ventricular systolic function is normal, with an estimated ejection fraction of 65-70%. There are no regional wall motion abnormalities. The left ventricular cavity size is normal. There is left ventricular concentric remodeling. Spectral Doppler shows a normal pattern of left ventricular diastolic filling.  Left Atrium: The left  atrium is normal in size.  Right Ventricle: The right ventricle is normal in size. There is normal right ventricular global systolic function.  Right Atrium: The right atrium is normal in size.  Aortic Valve: The aortic valve is trileaflet. There is no evidence of aortic valve regurgitation. The peak instantaneous gradient of the aortic valve is 4.5 mmHg. The mean gradient of the aortic valve is 2.7 mmHg.  Mitral Valve: The mitral valve is normal in structure. There is no evidence of mitral valve regurgitation.  Tricuspid Valve: The tricuspid valve is structurally normal. There is trace tricuspid regurgitation.  Pulmonic Valve: The pulmonic valve is structurally normal. There is physiologic pulmonic valve regurgitation.  Pericardium: There is no pericardial effusion noted.  Aorta: The aortic root is normal.  Pulmonary Artery: The tricuspid regurgitant velocity is 2.38 m/s, and with an estimated right atrial pressure of 3 mmHg, the estimated pulmonary artery pressure is normal with the RVSP at 25.6 mmHg.  Systemic Veins: The inferior vena cava appears to be of normal size. There is IVC inspiratory collapse greater than 50%.        CONCLUSIONS:   1. The left ventricular systolic function is normal with a 65-70% estimated ejection fraction.     QUANTITATIVE DATA SUMMARY:  2D MEASUREMENTS:                           Normal Ranges:  Ao Root d:     2.20 cm   (2.0-3.7cm)  LAs:           1.57 cm   (2.7-4.0cm)  IVSd:          1.03 cm   (0.6-1.1cm)  LVPWd:         1.18 cm   (0.6-1.1cm)  LVIDd:         2.59 cm   (3.9-5.9cm)  LVIDs:         1.90 cm  LV Mass Index: 49.2 g/m2  LV % FS        26.6 %     LA VOLUME:                                Normal Ranges:  LA Vol A4C:        32.1 ml    (22+/-6mL/m2)  LA Vol A2C:        26.7 ml  LA Vol BP:         29.3 ml  LA Vol Index A4C:  20.1 ml/m2  LA Vol Index A2C:  16.7 ml/m2  LA Vol Index BP:   18.3 ml/m2  LA Area A4C:       12.3 cm2  LA Area A2C:       11.2 cm2  LA Major Axis A4C: 4.0  "cm  LA Major Axis A2C: 4.0 cm  LA Volume Index:   18.3 ml/m2  LA Vol A4C:        29.4 ml  LA Vol A2C:        25.9 ml     AORTA MEASUREMENTS:                     Normal Ranges:  Asc Ao, d: 2.30 cm (2.1-3.4cm)     LV DIASTOLIC FUNCTION:                         Normal Ranges:  MV Peak E:    0.77 m/s (0.7-1.2 m/s)  MV Peak A:    0.77 m/s (0.42-0.7 m/s)  E/A Ratio:    0.99     (1.0-2.2)  MV e'         0.08 m/s (>8.0)  MV lateral e' 0.09 m/s  MV medial e'  0.08 m/s  E/e' Ratio:   9.02     (<8.0)     AORTIC VALVE:                                    Normal Ranges:  AoV Vmax:                1.07 m/s (<1.7m/s)  AoV Peak P.5 mmHg (<20mmHg)  AoV Mean P.7 mmHg (1.7-11.5mmHg)  LVOT Max Vega:            0.79 m/s (<1.1m/s)  AoV VTI:                 16.86 cm (18-25cm)  LVOT VTI:                12.63 cm  LVOT Diameter:           1.60 cm  (1.8-2.4cm)  AoV Area, VTI:           1.52 cm2 (2.5-5.5cm2)  AoV Area,Vmax:           1.50 cm2 (2.5-4.5cm2)  AoV Dimensionless Index: 0.75     RIGHT VENTRICLE:  TAPSE: 19.0 mm  RV s'  0.15 m/s     TRICUSPID VALVE/RVSP:                              Normal Ranges:  Peak TR Velocity: 2.38 m/s  RV Syst Pressure: 25.6 mmHg (< 30mmHg)  IVC Diam:         1.40 cm     PULMONIC VALVE:                       Normal Ranges:  PV Max Vega: 0.8 m/s  (0.6-0.9m/s)  PV Max P.3 mmHg        [id number] Saad Rao MD  Electronically signed on 5/3/2021 at 2:44:25 PM          Labwork   Complete Blood Count  Lab Results   Component Value Date    WBC 7.0 2025    HGB 13.6 2025    HCT 43.6 2025    MCV 96 2025     2025       Peripheral Eosinophil Count/Percentage:   Eosinophils Absolute (x10*3/uL)   Date Value   2025 0.01     Eosinophils % (%)   Date Value   2025 0.1       Serum Immunoglobulin E:    No results found for: \"IGE\"       ASSESSMENT/PLAN     Ms. Quevedo is a 29 y.o. female, who has a history of cerebral palsy.  She is a never " smoker, who presents to a [facility] for a follow up evaluation for asthma.    Patient has tried and failed acapella flutter device as well as mucinex for mucus releif. Has an insufficient expiratory drive 2/2 cerebral palsy.       Problem List and Orders  Problem List Items Addressed This Visit    None        Assessment and Plan / Recommendations:  Problem List Items Addressed This Visit    None     Asthma; moderate obstruction on most recent PFTs  - Continue Breo 200 mcg 1 puff once daily - rinse mouth after each use  - Continue albuterol HFA ipratropium/albuterol nebulizers twice a day  - Continue montelukast 10 mg at bedtime  - Acapella -ok to use PRN after breathing treatments for mucus relief  - Mucinex PRN for every 12 hours for mucus relief      - Continue percussion vest BID for mucus relief    Recurrent Chest pain at rest  - referral to cardiology    Follow up in 6 months or sooner if needed.    If you have any questions please call the office [phone]    Thank you for visiting the Pulmonary clinic today!   Jackie Ortiz CNP  [phone]     Electronically signed

## 2025-06-05 ENCOUNTER — APPOINTMENT (OUTPATIENT)
Facility: CLINIC | Age: 30
End: 2025-06-05
Payer: MEDICARE

## 2025-06-05 ENCOUNTER — TELEMEDICINE (OUTPATIENT)
Facility: CLINIC | Age: 30
End: 2025-06-05
Payer: MEDICARE

## 2025-06-05 DIAGNOSIS — J45.40 MODERATE PERSISTENT ASTHMA WITHOUT COMPLICATION (HHS-HCC): Primary | ICD-10-CM

## 2025-06-05 PROCEDURE — 99214 OFFICE O/P EST MOD 30 MIN: CPT

## 2025-06-05 PROCEDURE — G2211 COMPLEX E/M VISIT ADD ON: HCPCS

## 2025-06-05 ASSESSMENT — ASTHMA QUESTIONNAIRES
QUESTION_5 LAST FOUR WEEKS HOW WOULD YOU RATE YOUR ASTHMA CONTROL: WELL CONTROLLED
ACT_TOTALSCORE: 23
QUESTION_1 LAST FOUR WEEKS HOW MUCH OF THE TIME DID YOUR ASTHMA KEEP YOU FROM GETTING AS MUCH DONE AT WORK, SCHOOL OR AT HOME: NONE OF THE TIME
QUESTION_3 LAST FOUR WEEKS HOW OFTEN DID YOUR ASTHMA SYMPTOMS (WHEEZING, COUGHING, SHORTNESS OF BREATH, CHEST TIGHTNESS OR PAIN) WAKE YOU UP AT NIGHT OR EARLIER THAN USUAL IN THE MORNING: NOT AT ALL
QUESTION_4 LAST FOUR WEEKS HOW OFTEN HAVE YOU USED YOUR RESCUE INHALER OR NEBULIZER MEDICATION (SUCH AS ALBUTEROL): NOT AT ALL
QUESTION_2 LAST FOUR WEEKS HOW OFTEN HAVE YOU HAD SHORTNESS OF BREATH: 1 OR 2 TIMES PER WEEK

## 2025-06-05 ASSESSMENT — COLUMBIA-SUICIDE SEVERITY RATING SCALE - C-SSRS
6. HAVE YOU EVER DONE ANYTHING, STARTED TO DO ANYTHING, OR PREPARED TO DO ANYTHING TO END YOUR LIFE?: NO
2. HAVE YOU ACTUALLY HAD ANY THOUGHTS OF KILLING YOURSELF?: NO
1. IN THE PAST MONTH, HAVE YOU WISHED YOU WERE DEAD OR WISHED YOU COULD GO TO SLEEP AND NOT WAKE UP?: NO

## 2025-06-19 ENCOUNTER — APPOINTMENT (OUTPATIENT)
Dept: NEUROLOGY | Facility: CLINIC | Age: 30
End: 2025-06-19
Payer: MEDICARE

## 2025-06-26 ENCOUNTER — OFFICE VISIT (OUTPATIENT)
Dept: NEUROLOGY | Facility: CLINIC | Age: 30
End: 2025-06-26
Payer: MEDICARE

## 2025-06-26 VITALS — SYSTOLIC BLOOD PRESSURE: 123 MMHG | DIASTOLIC BLOOD PRESSURE: 80 MMHG | HEART RATE: 95 BPM

## 2025-06-26 DIAGNOSIS — G43.009 MIGRAINE WITHOUT AURA, NOT INTRACTABLE, WITHOUT STATUS MIGRAINOSUS: Primary | ICD-10-CM

## 2025-06-26 DIAGNOSIS — R55 VASOVAGAL EPISODE: ICD-10-CM

## 2025-06-26 PROCEDURE — 1036F TOBACCO NON-USER: CPT | Performed by: STUDENT IN AN ORGANIZED HEALTH CARE EDUCATION/TRAINING PROGRAM

## 2025-06-26 PROCEDURE — 99204 OFFICE O/P NEW MOD 45 MIN: CPT | Performed by: STUDENT IN AN ORGANIZED HEALTH CARE EDUCATION/TRAINING PROGRAM

## 2025-06-26 PROCEDURE — 99214 OFFICE O/P EST MOD 30 MIN: CPT | Performed by: STUDENT IN AN ORGANIZED HEALTH CARE EDUCATION/TRAINING PROGRAM

## 2025-06-26 ASSESSMENT — ENCOUNTER SYMPTOMS: DEPRESSION: 0

## 2025-06-26 NOTE — PATIENT INSTRUCTIONS
I suspect you headaches are either tension type headache or episodic migraine without aura. Given the low frequency and response to Tylenol, we decided to not make any changes to your medications.     Regarding your lightheadedness, I suspect this is vasovagal in etiology. The best way to avoid this is to stay well hydrated and limit sun exposure.     Follow up as needed.
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
## 2025-07-01 ENCOUNTER — APPOINTMENT (OUTPATIENT)
Dept: BEHAVIORAL HEALTH | Facility: CLINIC | Age: 30
End: 2025-07-01
Payer: MEDICARE

## 2025-07-02 ENCOUNTER — APPOINTMENT (OUTPATIENT)
Dept: BEHAVIORAL HEALTH | Facility: CLINIC | Age: 30
End: 2025-07-02
Payer: MEDICARE

## 2025-07-02 DIAGNOSIS — F70 MILD INTELLECTUAL DISABILITY: ICD-10-CM

## 2025-07-02 DIAGNOSIS — F41.9 ANXIETY: ICD-10-CM

## 2025-07-02 PROCEDURE — 1036F TOBACCO NON-USER: CPT | Performed by: COUNSELOR

## 2025-07-02 PROCEDURE — 90834 PSYTX W PT 45 MINUTES: CPT | Performed by: COUNSELOR
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
## 2025-07-10 ENCOUNTER — APPOINTMENT (OUTPATIENT)
Dept: PRIMARY CARE | Facility: CLINIC | Age: 30
End: 2025-07-10
Payer: MEDICARE

## 2025-07-10 ENCOUNTER — OFFICE VISIT (OUTPATIENT)
Dept: ORTHOPEDIC SURGERY | Facility: CLINIC | Age: 30
End: 2025-07-10
Payer: MEDICARE

## 2025-07-10 DIAGNOSIS — G80.0 CP (CEREBRAL PALSY), SPASTIC, QUADRIPLEGIC (MULTI): Primary | ICD-10-CM

## 2025-07-10 PROCEDURE — 99212 OFFICE O/P EST SF 10 MIN: CPT | Performed by: ORTHOPAEDIC SURGERY

## 2025-07-10 PROCEDURE — 99214 OFFICE O/P EST MOD 30 MIN: CPT | Performed by: ORTHOPAEDIC SURGERY

## 2025-07-10 NOTE — PROGRESS NOTES
Subjective   Patient ID: Anisha Quevedo is a 30 y.o. female who presents for No chief complaint on file..    History of Present Illness  The patient is a 30-year-old female with a history of cerebral palsy and spastic quadriplegia, presenting today for a follow-up visit. She is accompanied by her caregiver.    She was last seen in 11/2024 for Botox injections to her wrists to assist with feeding. The caregiver present today reports significant improvement in her condition. She is still able to feed herself and hold her utensils. They are not currently interested in repeating the Botox treatment as the effects of the previous one are still evident.    She has been experiencing numbness and tingling in both her upper and lower extremities, particularly when using the [place] lift or sitting on the toilet. However, her caregiver does not corroborate these symptoms, stating that she has not heard similar complaints at home. Her B12 levels were checked by her primary care physician six months ago and found to be within normal range.    She expresses a desire to discontinue home visits from [facility] and instead prefers to come to the office. However, due to the high volume of appointments at her facility, this is not feasible. She is currently participating in a day program that includes outings and communication skills training. Overall, she is flexible, although her hamstrings are tight and her hands slightly contracted. Despite this, she is able to straighten her arms and is generally doing well. Her caregiver has no additional complaints.    She reports improved wrist mobility following the Botox treatment and is now able to assist with personal hygiene tasks such as using the bathroom. She also mentions that her pump is functioning well.    She had a recent appointment with a neurologist who expressed concern about her oxygen levels. She is not currently using a CPAP machine for sleep. She reports chest pain and is curious  about the need for supplemental oxygen.    SOCIAL HISTORY  Exercise: Participates in outings and day programs, including activities like guiding someone blindfolded.    Previous HPI:  Anisha is a 28 year old female with a history of cerebral palsy, spastic quadriplegia who presents today for neck and jaw spasms. She has been having these for the past few months. She was last in our office 11/17/2023, where she underwent botox injections of her right biceps/brachialis/wrist flexors.  This was tolerated well and she reports improvements in her ability to feed herself.  She underwent an ITB pump exchange on 5/5/2023 and her incisions are well healed.  Pentec does her refills and she is happy with her current dose.     ROS: A 16 system review is negative for all items except as in the HPI.     Objective     There were no vitals taken for this visit.     Physical Exam  Musculoskeletal:  bilateral wrists: improved motion and looseness following Botox injections  left elbow: normal range of motion    Results  Labs   - B12 levels: Negative for any deficiency       Assessment & Plan  1. Cerebral Palsy:  Reports numbness and tingling in upper and lower extremities, particularly when using the [place] lift and sitting on the toilet. This is likely due to spinal cord tension during flexion. B12 levels were checked six months ago and were not deficient. The neurologist's note will be reviewed for further insights.  A sleep study will be conducted to assess oxygen levels during sleep. Advised to take deep breaths while using the nebulizer to ensure proper medication delivery.    2. Spastic Quadriplegia:  Received Botox injections in 11/2024, which have significantly improved her ability to feed herself and perform daily activities. There is no current interest in repeating the Botox injections as the effects are still holding well. Hamstrings are tight, and hands are slightly contracted, but overall, she is doing well.  Continue  "monitoring flexibility and muscle tone. No additional Botox injections planned at this time.    3. Medication Management:  Prefers to come to the office for medication refills every five weeks but was advised that it is safer for [facility] to handle this at her home. A request will be made to [facility] for a different nurse to visit her, as she thinks she gets \"poked too many times\" by the current nurse.      Follow-up: 07/2026      Addis Mak MD     This medical note was created with the assistance of artificial intelligence (AI) for documentation purposes. The content has been reviewed and confirmed by the healthcare provider for accuracy and completeness. Patient consented to the use of audio recording and use of AI during their visit.   "

## 2025-07-11 ENCOUNTER — APPOINTMENT (OUTPATIENT)
Dept: PRIMARY CARE | Facility: CLINIC | Age: 30
End: 2025-07-11
Payer: MEDICARE

## 2025-07-11 VITALS
OXYGEN SATURATION: 97 % | WEIGHT: 172 LBS | HEIGHT: 58 IN | DIASTOLIC BLOOD PRESSURE: 78 MMHG | HEART RATE: 81 BPM | SYSTOLIC BLOOD PRESSURE: 118 MMHG | BODY MASS INDEX: 36.11 KG/M2

## 2025-07-11 DIAGNOSIS — M62.81 MUSCLE WEAKNESS (GENERALIZED): ICD-10-CM

## 2025-07-11 DIAGNOSIS — Z00.00 ROUTINE GENERAL MEDICAL EXAMINATION AT HEALTH CARE FACILITY: Primary | ICD-10-CM

## 2025-07-11 DIAGNOSIS — K59.00 CONSTIPATION, UNSPECIFIED CONSTIPATION TYPE: ICD-10-CM

## 2025-07-11 DIAGNOSIS — J45.51 SEVERE PERSISTENT ASTHMA WITH ACUTE EXACERBATION (MULTI): ICD-10-CM

## 2025-07-11 PROCEDURE — 3008F BODY MASS INDEX DOCD: CPT | Performed by: INTERNAL MEDICINE

## 2025-07-11 PROCEDURE — 99213 OFFICE O/P EST LOW 20 MIN: CPT | Performed by: INTERNAL MEDICINE

## 2025-07-11 PROCEDURE — 1036F TOBACCO NON-USER: CPT | Performed by: INTERNAL MEDICINE

## 2025-07-11 PROCEDURE — G0439 PPPS, SUBSEQ VISIT: HCPCS | Performed by: INTERNAL MEDICINE

## 2025-07-11 RX ORDER — CICLOPIROX OLAMINE 7.7 MG/G
CREAM TOPICAL
COMMUNITY
Start: 2025-07-09

## 2025-07-11 RX ORDER — PHENOL/SODIUM PHENOLATE
AEROSOL, SPRAY (ML) MUCOUS MEMBRANE
COMMUNITY
Start: 2025-06-25

## 2025-07-11 ASSESSMENT — ENCOUNTER SYMPTOMS
APPETITE CHANGE: 0
SINUS PAIN: 0
NEUROLOGICAL NEGATIVE: 1
NECK PAIN: 0
EYES NEGATIVE: 1
ALLERGIC/IMMUNOLOGIC NEGATIVE: 1
SEIZURES: 0
ENDOCRINE NEGATIVE: 1
FACIAL ASYMMETRY: 0
SINUS PRESSURE: 0
ACTIVITY CHANGE: 0
UNEXPECTED WEIGHT CHANGE: 0
NERVOUS/ANXIOUS: 0
MUSCULOSKELETAL NEGATIVE: 1
POLYDIPSIA: 0
BLOOD IN STOOL: 0
SLEEP DISTURBANCE: 0
CONFUSION: 0
JOINT SWELLING: 0
ADENOPATHY: 0
DIARRHEA: 0
CHEST TIGHTNESS: 0
CARDIOVASCULAR NEGATIVE: 1
CONSTIPATION: 0
MYALGIAS: 0
EYE PAIN: 0
DIFFICULTY URINATING: 0
WOUND: 0
COUGH: 1
SORE THROAT: 0
SHORTNESS OF BREATH: 1
DIZZINESS: 0
ABDOMINAL PAIN: 1
HEMATOLOGIC/LYMPHATIC NEGATIVE: 1
VOICE CHANGE: 0
FLANK PAIN: 0
TROUBLE SWALLOWING: 0
PALPITATIONS: 0
WHEEZING: 0
NAUSEA: 0
HALLUCINATIONS: 0
POLYPHAGIA: 0
EYE DISCHARGE: 0
AGITATION: 0
ARTHRALGIAS: 0
COLOR CHANGE: 0
WEAKNESS: 0
STRIDOR: 0
LIGHT-HEADEDNESS: 0
CONSTITUTIONAL NEGATIVE: 1
TREMORS: 0
EYE REDNESS: 0
DYSURIA: 0
FREQUENCY: 0
NECK STIFFNESS: 0
PSYCHIATRIC NEGATIVE: 1
BRUISES/BLEEDS EASILY: 0
NUMBNESS: 0
SPEECH DIFFICULTY: 0
VOMITING: 0
BACK PAIN: 0
HEADACHES: 0

## 2025-07-11 ASSESSMENT — ACTIVITIES OF DAILY LIVING (ADL)
DOING_HOUSEWORK: TOTAL CARE
DRESSING: DEPENDENT
BATHING: DEPENDENT
GROCERY_SHOPPING: TOTAL CARE
TAKING_MEDICATION: TOTAL CARE
MANAGING_FINANCES: TOTAL CARE

## 2025-07-11 ASSESSMENT — PATIENT HEALTH QUESTIONNAIRE - PHQ9
2. FEELING DOWN, DEPRESSED OR HOPELESS: NOT AT ALL
1. LITTLE INTEREST OR PLEASURE IN DOING THINGS: NOT AT ALL
SUM OF ALL RESPONSES TO PHQ9 QUESTIONS 1 AND 2: 0

## 2025-07-11 NOTE — PROGRESS NOTES
Subjective   Reason for Visit: Anisha Quevedo is an 30 y.o. female here for a Medicare Wellness visit.     Past Medical, Surgical, and Family History reviewed and updated in chart.    Reviewed all medications by prescribing practitioner or clinical pharmacist (such as prescriptions, OTCs, herbal therapies and supplements) and documented in the medical record.    HPI  Here for follow and wellness  She states she has been doing ok  Some SOB over last few days  Feels better with her breathing treatment  Only coughs with this  No wheezing    Some cramping after bowel movement. Happens every time  Has BM daily- states soft    She states she is happy but still struggles with her anxiety    Wants to lose weight. Does like sweets like oreos and ice cream  Does like yogurt and veggies  States she does do some exercises    She states her orthopedic ordered a sleep study to check her oxygen levels again    When leaving Anisha felt a little dizziness. Given water and vitals checked and all normal    Patient Care Team:  Shelia Garcia MD as PCP - General  Shelia Garcia MD as PCP - [facility]     Review of Systems   Constitutional: Negative.  Negative for activity change, appetite change and unexpected weight change.   HENT:  Positive for congestion. Negative for ear discharge, ear pain, hearing loss, mouth sores, nosebleeds, sinus pressure, sinus pain, sore throat, trouble swallowing and voice change.    Eyes: Negative.  Negative for pain, discharge, redness and visual disturbance.   Respiratory:  Positive for cough and shortness of breath. Negative for chest tightness, wheezing and stridor.    Cardiovascular: Negative.  Negative for chest pain, palpitations and leg swelling.   Gastrointestinal:  Positive for abdominal pain. Negative for blood in stool, constipation, diarrhea, nausea and vomiting.   Endocrine: Negative.  Negative for polydipsia, polyphagia and polyuria.   Genitourinary: Negative.  Negative for  "difficulty urinating, dysuria, flank pain, frequency and urgency.   Musculoskeletal: Negative.  Negative for arthralgias, back pain, gait problem, joint swelling, myalgias, neck pain and neck stiffness.   Skin: Negative.  Negative for color change, rash and wound.   Allergic/Immunologic: Negative.  Negative for environmental allergies, food allergies and immunocompromised state.   Neurological: Negative.  Negative for dizziness, tremors, seizures, syncope, facial asymmetry, speech difficulty, weakness, light-headedness, numbness and headaches.   Hematological: Negative.  Negative for adenopathy. Does not bruise/bleed easily.   Psychiatric/Behavioral: Negative.  Negative for agitation, behavioral problems, confusion, hallucinations, sleep disturbance and suicidal ideas. The patient is not nervous/anxious.    All other systems reviewed and are negative.      Objective   Vitals:  /78 (BP Location: Left arm, Patient Position: Sitting, BP Cuff Size: Adult)   Pulse 81   Ht (!) 1.473 m (4' 9.99\")   Wt 78 kg (172 lb)   SpO2 97%   BMI 35.96 kg/m²       Physical Exam  Constitutional:       Appearance: Normal appearance.   HENT:      Right Ear: Tympanic membrane normal.      Left Ear: Tympanic membrane normal.      Mouth/Throat:      Mouth: Mucous membranes are dry.      Pharynx: No oropharyngeal exudate or posterior oropharyngeal erythema.   Cardiovascular:      Rate and Rhythm: Normal rate and regular rhythm.      Heart sounds: Normal heart sounds. No murmur heard.     No gallop.   Pulmonary:      Effort: Pulmonary effort is normal. No respiratory distress.      Breath sounds: Normal breath sounds. No wheezing or rhonchi.   Abdominal:      General: There is no distension.      Palpations: There is no mass.      Tenderness: There is no abdominal tenderness.   Musculoskeletal:      Right lower leg: Edema (trace) present.      Left lower leg: No edema (trace).   Neurological:      Mental Status: She is alert.      " Comments: In wheel chair  Muscle atrophy         Assessment & Plan  Constipation, unspecified constipation type    Orders:    psyllium (Metamucil) 3.4 gram packet; Take 1 packet by mouth once daily. Mix and drink with at least 8 ounces of water or juice.    Routine general medical examination at health care facility    Orders:    1 Year Follow Up In [facility] - PCP - Wellness Exam; Future    Muscle weakness (generalized)         Severe persistent asthma with acute exacerbation (Multi)            MCW done    Abdominal cramping-trial metamucil, if not better- see GI again     Numbness/tingling  -states it is better  - labs normal      Dysphagia     Schizophrenia: she is on risperdal  -off seroquel due to adverse side effects     Chronic GI issues-sees GI   -Hx of Schatzki ring  -RUQ pain: has gallstones and surgery stated not gallbladder. States if has pain-can consider MRI of pancreas as some fullness     Palpitations: holter neg  -continue metoprolol     Neck stiffness: naproxen as needed  -continue tens unit     Vaginal itching: seeing gyn  -comes and goes     Headaches: ok to use tylenol or naproxen as needed with zofran if nausea     Primary Spastic quadriplegia CP  -has baclofen pump  -doing botox  -in wheelchair  -PT/OT, and ST  -hx of peg     Allergic rhinitis: on flonase  -sees ENT     Had unknown heart procedure: unclear and were not able to find out, normal echo 5/21     Dysphagia: no longer has peg     Vitamin D deficiency-start vitamin D     ASthma: sees Dr. Abarca  -on symbicort, albuterol, and Singulair  -lungs clear today- advised to use nebs     GERD: on omeperazole     Constipation: prn meds     Abnormal menses-SEES GYN        Abnormal TSH level- cont to follow, free t4 ok     Follows with DR. Rima Addison--gyn  GI- Dr. Nance     f/u 4 months. Advised to see gyn and get prevnar-20 in fall  UTD covid, tdap, pneumovax

## 2025-07-11 NOTE — ASSESSMENT & PLAN NOTE
Orders:    psyllium (Metamucil) 3.4 gram packet; Take 1 packet by mouth once daily. Mix and drink with at least 8 ounces of water or juice.

## 2025-07-11 NOTE — PATIENT INSTRUCTIONS
I would recommend Prevnar-20 in October as she will be due. This can be at the pharmacy or here    Anisha is due for her yearly gynecology exam for her pap test    Start Metamucil once a day for 1 month and see if helps cramping. If helps,we will continue. If does not- we will stop and have you see GI    Followup 6 bmonths

## 2025-08-01 ENCOUNTER — APPOINTMENT (OUTPATIENT)
Dept: BEHAVIORAL HEALTH | Facility: CLINIC | Age: 30
End: 2025-08-01
Payer: MEDICARE

## 2025-08-01 DIAGNOSIS — F20.0 PARANOID SCHIZOPHRENIA (MULTI): Primary | ICD-10-CM

## 2025-08-01 DIAGNOSIS — F41.9 ANXIETY: ICD-10-CM

## 2025-08-01 DIAGNOSIS — F70 MILD INTELLECTUAL DISABILITY: ICD-10-CM

## 2025-08-01 DIAGNOSIS — G80.0 CP (CEREBRAL PALSY), SPASTIC, QUADRIPLEGIC (MULTI): ICD-10-CM

## 2025-08-01 PROCEDURE — G2212 PROLONG OUTPT/OFFICE VIS: HCPCS | Performed by: PSYCHIATRY & NEUROLOGY

## 2025-08-01 PROCEDURE — 99215 OFFICE O/P EST HI 40 MIN: CPT | Performed by: PSYCHIATRY & NEUROLOGY

## 2025-08-01 PROCEDURE — G2211 COMPLEX E/M VISIT ADD ON: HCPCS | Performed by: PSYCHIATRY & NEUROLOGY

## 2025-08-01 ASSESSMENT — ENCOUNTER SYMPTOMS
DYSURIA: 0
VOMITING: 0
CHOKING: 0
SLEEP DISTURBANCE: 0
COUGH: 1
PALPITATIONS: 0
NAUSEA: 0
SHORTNESS OF BREATH: 0
ABDOMINAL DISTENTION: 0
HALLUCINATIONS: 1
SEIZURES: 0
DIZZINESS: 0
TREMORS: 0

## 2025-08-01 NOTE — PATIENT INSTRUCTIONS
"Anisha continues to report experiencing auditory hallucinations and delusional beliefs that \"people\" (including her sister) are somehow trapped in the basement of the home.  It's still rather difficult to determine how much of these reported symptoms are the result of a psychotic process, anxiety, or possibly maladaptive attention-seeking behaviors.      We are not planning to make additional medication changes today, but if we do in the future I would probably focus on trying an antidepressant to help address comorbid anxiety rather than just simply increasing her antipsychotic (risperidone).    Anisha may ask staff to check in the basement to help validate her worries, and staff should continue to provide honest and straightforward answers (\"I looked carefully, and I do not see anyone in the basement\" or \"I know that you are sure of what you are hearing, but I simply don't hear any voices/sounds/etc.\").  If she insists that police be contacted to address the situation, I would continue to reinforce that staff's job is to keep everyone safe, and if there were concerns about safety in the house they would absolutely not hesitate to call the police.    We will check in again in 2 months (10/06/25).  "

## 2025-08-01 NOTE — PROGRESS NOTES
"Outpatient Adult IDD Psychiatry    A HIPAA-compliant interactive audio and video telecommunication system which permits real time communications between the patient (at the originating site) and provider (at the distant site) was utilized to provide this telehealth service.     The patient, family, caregivers and guardian (as appropriate) have provided consent to conduct treatment via this telehealth service.      The patient's identity and physical location were verified at the time of this visit.     Present for appointment: Anisha and [facility] (Wilfrido).    Appointment location: [facility].  KITA Rucker    Anisha has not had any major health issues or other treatment changes since we last met.    She says she is adapting to the presence of their new male house-mate even though he can often be very disruptive at home.    Though she is no longer asserting that she is hearing people in the home's attic, she now believes that \"people,\" including her sister, are trapped in the home's basement.  Anisha says she often talks to her sister through the heating/cooling vent in the bathroom, and Wilfrido confirms that staff have noted an increase in self-talk from Anisha when she is in the bathroom.  It sounds like Anisha managed to call out the local police to the home just recently to have them investigate the basement, but they declined to do so.  Anisha also frequently reports seeing other family members appear in the home from time to time.    It doesn't seem like these experiences are very worrisome for Anisha, and there are times that she will intentionally resist talking about it with staff even though she says that it's bothering her.  Recently she said she didn't want to talk about it with staff because she \"didn't want to scare the new marlys.\"    Reviewed data tracking from the past few months:     A B C D E F G H   Jan-25 19 5 9 3 4 4 0 0   Feb-25 13 4 12 8 7 2 3 6   Mar-25 0 2 " 2 0 0 0 0 0   Apr-25 12 0 11 0 0 0 0 0   May-25 15 0 11 1 0 0 1 0   June-25 19 0 13 0 0 0 0 1   July-25 18 0 1 0 0 0 0 0     Key:  A: Visual and/or auditory hallucinations  B: Loss of appetite  C: Repetitive gestures or movements  D: Intense levels of anxiety and fear  E: Calling for staff over and over again, asking a lot of questions or repeated questions  F: Saying that she is experiencing chest pains and/or needs to go to the hospital, etc.  G: Excessive requests to use the restroom  H: Vivid, bizarre, overly embellished, paranoid thoughts or statements (accusations of others trying to physically harm her, stealing from her, putting things in her, out to get her, etc.)    Review of Systems   HENT:  Negative for drooling and trouble swallowing.    Respiratory:  Positive for cough (HFCWO vest). Negative for choking and shortness of breath.    Cardiovascular:  Negative for chest pain and palpitations.   Gastrointestinal:  Negative for abdominal distention, nausea and vomiting.   Genitourinary:  Negative for dysuria and vaginal pain.   Neurological:  Negative for dizziness, tremors and seizures.   Psychiatric/Behavioral:  Positive for hallucinations. Negative for sleep disturbance and suicidal ideas.         Controlled Substance Evaluation  OARRS/PDMP reviewed: Matt Stveens MD on 8/1/2025  6:37 AM  I have personally reviewed the OARRS report for Anisha Quevedo.     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[facility]   Work or day program [employer] 5 days/week   School N/A   Guardianship Self   SSA N/A   Bx Specialist N/A   Nicotine None   Alcohol None   Other drugs None     OBJECTIVE    Lab Results   Component Value Date    HGB 13.6 01/27/2025     01/27/2025    GLUCOSE 94 01/27/2025     01/27/2025    K 4.0 01/27/2025    CO2  "23 01/27/2025    CALCIUM 9.8 01/27/2025    CREATININE 0.40 (L) 01/27/2025    AST 15 01/27/2025    ALT 13 01/27/2025    TSH 0.97 03/18/2025    FREET4 0.86 11/22/2023    CHOL 188 03/18/2025    LDLCALC 103 (H) 03/18/2025    TRIG 99 03/18/2025    YYOWXJGW48 570 03/18/2025    VITD25 23 (L) 11/22/2023    IRON 68 11/22/2023    IRONSAT 19 (L) 11/22/2023     Therapeutic Drug Monitoring  N/A    Electrocardiograms  01/27/2025: NSR, VR 83, QTc 413  08/16/2024: NSR, VR 86, QTc 414  06/10/2024: NSR, non-sp ST/TW abn, , QTc 394  12/06/2023: NSR, VR 93, QTc 425  05/19/2021: NSR, VR 80, QTc 405    MENTAL STATUS EXAM  General/Appearance: Appropriate dress/hygiene/grooming.  Attitude/Behavior: Average eye contact. Calm/pleasant. Cooperative.  Speech/Communication: Normal volume/rate/tone. Spastic.  Motor: Spasticity.  Gait: In wheelchair.  Mood: Calm/pleasant.  Affect: Generally quite happy and upbeat. Very brief moments of appearing flat/withdrawn.  Thought processes: Mostly organized and coherent.  Thought content: Persisting delusional themes. Only briefly dwells on delusional topics and easily redirected. She is not being driven to do bizarre or unsafe things at home by her delusional beliefs.  Perception: Does not appear to be experiencing or responding to hallucinations. Does not report any auditory or visual hallucinations.  Sensorium/Cognition: Alert, awake, oriented to person/place/time. Intellectual impairment.  Memory: Recent & remote recall is intact.  Insight: Impaired reality testing. Says she can tell when she IS having hallucinations, and that recent experiences are NOT hallucinations.  Judgment: Impaired. Questionable.     ASSESSMENT  Anisha continues to report experiencing auditory hallucinations and delusional beliefs that \"people\" (including her sister) are now somehow trapped in the basement of the home (no longer the attic of the home).  It's still rather difficult to determine how much of these reported " "symptoms are the result of a psychotic process, anxiety, or possibly maladaptive attention-seeking behaviors.  How she presents these problems, and how she responds to them, with some level of indifference or being \"selectively\" concerned about the situation certainly suggests more of a behavioral etiology.  We are not planning to make additional medication changes today, but if we do in the future I would probably focus on trying an antidepressant to help address comorbid anxiety rather than just simply increasing her antipsychotic (risperidone).    PROBLEM LIST  Intellectual developmental disorder, mild  Schizophrenia  Anxiety disorder, r/o PTSD    PLAN  -- Continue risperidone 1mg - 1mg - 4mg (AM, 14:00, HS) for psychosis  -- Continue melatonin 5mg at bedtime for sleep  -- Continue hydroxyzine 50mg at bedtime for sleep and anxiety  -- Follow up 2 months    Matt Stevens MD    TIME-BASED BILLING SUMMARY   Preparing to see the patient on date of patient encounter: 5 minutes.  Direct time spent with patient/family/caregiver performing evaluation: 60 minutes.  Documenting clinical information: 10 minutes.  TOTAL TIME SPENT: 75 minutes.         [1]   Outpatient Medications Prior to Visit   Medication Sig Dispense Refill    acetaminophen (Tylenol) 325 mg tablet Take 1-2 tablets (325-650 mg) by mouth every 4 hours if needed for fever (temp greater than 38.0 C) (or pain). No more than 3000 mg per day      acetylcysteine (Mucomyst) 200 mg/mL (20 %) nebulizer solution       albuterol 2.5 mg /3 mL (0.083 %) nebulizer solution Take 3 mL (2.5 mg) by nebulization every 4 hours if needed for wheezing. 300 mL 1    alum-mag hydroxide-simeth (Maalox MAX) 400-400-40 mg/5 mL suspension Take 15 mL by mouth once daily as needed.      ammonium lactate (Amlactin) 12 % cream Apply 1 Application topically once daily. Apply to right heal      azelastine (Astelin) 137 mcg (0.1 %) nasal spray Administer 2 sprays into each nostril " once daily. Use in each nostril as directed 30 mL 11    baclofen (Lioresal) 20 mg tablet Take 1 tablet (20 mg) by mouth 2 times a day. 60 tablet 11    benzonatate (Tessalon) 100 mg capsule Take 1 capsule (100 mg) by mouth 3 times a day as needed for cough. Do not crush or chew. 20 capsule 0    cholecalciferol (Vitamin D3) 50 mcg (2,000 unit) capsule Take by mouth once daily.      ciclopirox (Loprox) 0.77 % cream       ciclopirox (Loprox) 0.77 % gel Apply 1 Application topically once daily. Apply to right great toenail      docusate sodium (Colace) 100 mg capsule Take 1 capsule (100 mg) by mouth 2 times a day as needed for constipation. Over the Counter 60 capsule 1    famotidine (Pepcid) 20 mg tablet Take 1 tablet (20 mg) by mouth once daily. Take on an empty stomach 90 tablet 0    ferrous sulfate 325 (65 Fe) MG tablet Take 1 tablet by mouth once daily.      fluticasone (Flonase) 50 mcg/actuation nasal spray Administer 2 sprays into each nostril once daily. Shake gently. Before first use, prime pump. After use, clean tip and replace cap. 16 g 12    fluticasone furoate-vilanteroL (Breo Ellipta) 200-25 mcg/dose inhaler Inhale 1 puff once daily. Rinse mouth with water after use to reduce aftertaste and incidence of candidiasis. Do not swallow. 1 each 3    hydrOXYzine HCL (Atarax) 50 mg tablet Take 1 tablet (50 mg) by mouth once daily at bedtime. 30 tablet 11    inhaler,assist device,lg mask (AEROCHAMBER PLUS FLOW-VU,L MSK MISC) Use as directed with inhaler      ipratropium-albuteroL (Duo-Neb) 0.5-2.5 mg/3 mL nebulizer solution Take 3 mL by nebulization 3 times a day. 180 mL 2    lidocaine HCL 4 % adhesive patch,medicated Apply 1 patch topically once daily as needed (rib pain). 30 patch 1    loperamide (Imodium) 2 mg capsule       LORazepam (Ativan) 1 mg tablet Take 1 tablet (1 mg) by mouth as needed every 6 hours for symptoms of psychosis (reported hallucinations, paranoid thinking, severe anxiety, screaming/yelling,  and excessive reassurance-seeking). 60 tablet 0    melatonin 5 mg capsule Take 1 capsule (5 mg) by mouth once daily at bedtime. 30 capsule 11    metoprolol tartrate (Lopressor) 25 mg tablet Take 0.5 tablets (12.5 mg) by mouth 2 times a day.      montelukast (Singulair) 10 mg tablet Take 1 tablet (10 mg) by mouth once daily at bedtime.      multivitamin tablet Take 1 tablet by mouth every other day.      mupirocin (Bactroban) 2 % ointment Apply a pea-size amount inside both nostrils once a day      naproxen (Naprosyn) 250 mg tablet       nystatin (Mycostatin) 100,000 unit/gram powder Apply 1 Application topically if needed for itching. 15 g 3    omeprazole (PriLOSEC) 20 mg tablet,delayed release (DR/EC) EC tablet       omeprazole OTC (PriLOSEC OTC) 20 mg EC tablet Take 2 tablets (40 mg) by mouth once daily in the morning. Take before meals AND 1 tablet (20 mg) once daily in the evening.  Take before meals. Take 30-60 minutes before breakfast. 90 tablet 11    ondansetron (Zofran) 4 mg tablet       polyethylene glycol (Glycolax, Miralax) 17 gram/dose powder Take 17 g by mouth 2 times a day. Over the counter 850 g 11    psyllium (Metamucil) 3.4 gram packet Take 1 packet by mouth once daily. Mix and drink with at least 8 ounces of water or juice. 30 packet 0    Refresh Plus 0.5 % ophthalmic solution Administer 1 drop into both eyes 2 times a day.      risperiDONE (RisperDAL) 1 mg tablet Take 1 tablet (1 mg) by mouth twice daily - morning and 2pm. 60 tablet 5    risperiDONE (RisperDAL) 4 mg tablet Take 1 tablet (4 mg) by mouth once daily at bedtime. 30 tablet 5    sodium chloride (Nasal Moisturizing) 0.65 % nasal spray Administer 1 spray into each nostril 2 times a day as needed (for dryness).       No facility-administered medications prior to visit.

## 2025-08-02 ASSESSMENT — ENCOUNTER SYMPTOMS: TROUBLE SWALLOWING: 0

## 2025-08-07 ENCOUNTER — PATIENT MESSAGE (OUTPATIENT)
Dept: PRIMARY CARE | Facility: CLINIC | Age: 30
End: 2025-08-07
Payer: MEDICARE

## 2025-08-07 DIAGNOSIS — G80.0 CP (CEREBRAL PALSY), SPASTIC, QUADRIPLEGIC (MULTI): Primary | ICD-10-CM

## 2025-08-20 ENCOUNTER — APPOINTMENT (OUTPATIENT)
Dept: BEHAVIORAL HEALTH | Facility: CLINIC | Age: 30
End: 2025-08-20
Payer: MEDICARE

## 2025-09-26 ENCOUNTER — APPOINTMENT (OUTPATIENT)
Dept: BEHAVIORAL HEALTH | Facility: CLINIC | Age: 30
End: 2025-09-26
Payer: MEDICARE

## 2025-10-06 ENCOUNTER — APPOINTMENT (OUTPATIENT)
Dept: BEHAVIORAL HEALTH | Facility: CLINIC | Age: 30
End: 2025-10-06
Payer: MEDICARE

## 2025-11-14 ENCOUNTER — APPOINTMENT (OUTPATIENT)
Dept: PRIMARY CARE | Facility: CLINIC | Age: 30
End: 2025-11-14
Payer: MEDICARE

## 2025-12-02 ENCOUNTER — APPOINTMENT (OUTPATIENT)
Dept: OPHTHALMOLOGY | Facility: CLINIC | Age: 30
End: 2025-12-02
Payer: MEDICARE

## 2025-12-09 ENCOUNTER — APPOINTMENT (OUTPATIENT)
Facility: CLINIC | Age: 30
End: 2025-12-09
Payer: MEDICARE